# Patient Record
Sex: FEMALE | Race: WHITE | NOT HISPANIC OR LATINO | Employment: OTHER | URBAN - METROPOLITAN AREA
[De-identification: names, ages, dates, MRNs, and addresses within clinical notes are randomized per-mention and may not be internally consistent; named-entity substitution may affect disease eponyms.]

---

## 2017-02-12 ENCOUNTER — RADIANT APPOINTMENT (OUTPATIENT)
Dept: GENERAL RADIOLOGY | Facility: CLINIC | Age: 77
End: 2017-02-12
Attending: PHYSICIAN ASSISTANT
Payer: MEDICARE

## 2017-02-12 ENCOUNTER — OFFICE VISIT (OUTPATIENT)
Dept: URGENT CARE | Facility: URGENT CARE | Age: 77
End: 2017-02-12
Payer: MEDICARE

## 2017-02-12 DIAGNOSIS — S69.90XA FINGER INJURY, UNSPECIFIED LATERALITY, INITIAL ENCOUNTER: ICD-10-CM

## 2017-02-12 DIAGNOSIS — M79.645 PAIN OF FINGER OF LEFT HAND: ICD-10-CM

## 2017-02-12 DIAGNOSIS — S69.90XA FINGER INJURY, UNSPECIFIED LATERALITY, INITIAL ENCOUNTER: Primary | ICD-10-CM

## 2017-02-12 DIAGNOSIS — S66.812A RUPTURE OF EXTENSOR TENDON OF LEFT HAND, INITIAL ENCOUNTER: ICD-10-CM

## 2017-02-12 PROCEDURE — 29130 APPL FINGER SPLINT STATIC: CPT | Performed by: PHYSICIAN ASSISTANT

## 2017-02-12 PROCEDURE — 73140 X-RAY EXAM OF FINGER(S): CPT | Mod: LT

## 2017-02-12 PROCEDURE — 99213 OFFICE O/P EST LOW 20 MIN: CPT | Mod: 25 | Performed by: PHYSICIAN ASSISTANT

## 2017-02-12 NOTE — MR AVS SNAPSHOT
After Visit Summary   2/12/2017    Aurora Figueroa    MRN: [id number]           Patient Information     Date Of Birth          1940        Visit Information        Provider Department      2/12/2017 5:15 PM Arias Umanzor PA-C [facility]        Today's Diagnoses     Finger injury, unspecified laterality, initial encounter    -  1    Pain of finger of left hand        Rupture of extensor tendon of left hand, initial encounter           Follow-ups after your visit        Who to contact     If you have questions or need follow up information about today's clinic visit or your schedule please contact [facility] directly at [phone].  Normal or non-critical lab and imaging results will be communicated to you by MyChart, letter or phone within 4 business days after the clinic has received the results. If you do not hear from us within 7 days, please contact the clinic through MyChart or phone. If you have a critical or abnormal lab result, we will notify you by phone as soon as possible.  Submit refill requests through [employer] or call your pharmacy and they will forward the refill request to us. Please allow 3 business days for your refill to be completed.          Additional Information About Your Visit        Care EveryWhere ID     This is your Care EveryWhere ID. This could be used by other organizations to access your [place] medical records  [id number]        Your Vitals Were     Last Period                   (LMP Unknown)            Blood Pressure from Last 3 Encounters:   02/16/17 120/82   07/01/16 128/60   02/15/16 109/69    Weight from Last 3 Encounters:   02/16/17 204 lb (92.5 kg)   07/01/16 197 lb (89.4 kg)   02/15/16 200 lb 12.8 oz (91.1 kg)              We Performed the Following     APPLY FINGER SPLINT STATIC          Today's Medication Changes          These changes are accurate as of: 2/12/17 11:59 PM.  If you have  any questions, ask your nurse or doctor.               These medicines have changed or have updated prescriptions.        Dose/Directions    aspirin 325 MG EC tablet   This may have changed:    - how much to take  - when to take this   Used for:  Failed total knee arthroplasty, initial encounter (H)        Dose:  325 mg   Take 1 tablet (325 mg) by mouth 2 times daily (with meals)   Quantity:  60 tablet   Refills:  0                Primary Care Provider Office Phone # Fax #    Yoselin Mancera [phone]       [facility] [address] [address] [facility] [address]        Thank you!     Thank you for choosing [facility]  for your care. Our goal is always to provide you with excellent care. Hearing back from our patients is one way we can continue to improve our services. Please take a few minutes to complete the written survey that you may receive in the mail after your visit with us. Thank you!             Your Updated Medication List - Protect others around you: Learn how to safely use, store and throw away your medicines at www.disposemymeds.org.          This list is accurate as of: 2/12/17 11:59 PM.  Always use your most recent med list.                   Brand Name Dispense Instructions for use    aspirin 325 MG EC tablet     60 tablet    Take 1 tablet (325 mg) by mouth 2 times daily (with meals)       calcium-vitamin D 500-125 MG-UNIT Tabs      Take 6 tablets by mouth daily       Cranberry 200 MG Caps      Take 1 tablet by mouth daily       FLINTSTONES COMPLETE PO      Take 1 tablet by mouth 2 times daily       levothyroxine 50 MCG tablet    SYNTHROID/LEVOTHROID    90 tablet    TAKE 1 TABLET BY MOUTH DAILY       OMEPRAZOLE PO      Take 20 mg by mouth daily       terbinafine 1 % cream    lamISIL     Apply topically daily as needed Applies to spot on forehead       VITAMIN B 12 PO      Take 2,500 mcg by mouth daily sublingual       vitamin D 1000  UNITS capsule      Take 1 capsule by mouth daily

## 2017-02-16 ENCOUNTER — OFFICE VISIT (OUTPATIENT)
Dept: FAMILY MEDICINE | Facility: CLINIC | Age: 77
End: 2017-02-16
Payer: MEDICARE

## 2017-02-16 VITALS
RESPIRATION RATE: 12 BRPM | BODY MASS INDEX: 33.99 KG/M2 | WEIGHT: 204 LBS | OXYGEN SATURATION: 96 % | HEART RATE: 54 BPM | HEIGHT: 65 IN | SYSTOLIC BLOOD PRESSURE: 120 MMHG | DIASTOLIC BLOOD PRESSURE: 82 MMHG | TEMPERATURE: 97.5 F

## 2017-02-16 DIAGNOSIS — R00.0 TACHYCARDIA: Chronic | ICD-10-CM

## 2017-02-16 DIAGNOSIS — L10.4: ICD-10-CM

## 2017-02-16 DIAGNOSIS — I10 BENIGN ESSENTIAL HYPERTENSION: ICD-10-CM

## 2017-02-16 DIAGNOSIS — E78.00 HYPERCHOLESTEROLEMIA: ICD-10-CM

## 2017-02-16 DIAGNOSIS — R73.02 GLUCOSE INTOLERANCE (IMPAIRED GLUCOSE TOLERANCE): Primary | ICD-10-CM

## 2017-02-16 LAB
ALT SERPL W P-5'-P-CCNC: 23 U/L (ref 0–50)
ANION GAP SERPL CALCULATED.3IONS-SCNC: 10 MMOL/L (ref 3–14)
BUN SERPL-MCNC: 26 MG/DL (ref 7–30)
CALCIUM SERPL-MCNC: 10.3 MG/DL (ref 8.5–10.1)
CHLORIDE SERPL-SCNC: 103 MMOL/L (ref 94–109)
CHOLEST SERPL-MCNC: 175 MG/DL
CO2 SERPL-SCNC: 27 MMOL/L (ref 20–32)
CREAT SERPL-MCNC: 0.9 MG/DL (ref 0.52–1.04)
GFR SERPL CREATININE-BSD FRML MDRD: 61 ML/MIN/1.7M2
GLUCOSE SERPL-MCNC: 97 MG/DL (ref 70–99)
HBA1C MFR BLD: 5.6 % (ref 4.3–6)
HDLC SERPL-MCNC: 61 MG/DL
LDLC SERPL CALC-MCNC: 92 MG/DL
NONHDLC SERPL-MCNC: 114 MG/DL
POTASSIUM SERPL-SCNC: 4 MMOL/L (ref 3.4–5.3)
SODIUM SERPL-SCNC: 140 MMOL/L (ref 133–144)
TRIGL SERPL-MCNC: 111 MG/DL

## 2017-02-16 PROCEDURE — 80061 LIPID PANEL: CPT | Performed by: FAMILY MEDICINE

## 2017-02-16 PROCEDURE — 80048 BASIC METABOLIC PNL TOTAL CA: CPT | Performed by: FAMILY MEDICINE

## 2017-02-16 PROCEDURE — 36415 COLL VENOUS BLD VENIPUNCTURE: CPT | Performed by: FAMILY MEDICINE

## 2017-02-16 PROCEDURE — 99214 OFFICE O/P EST MOD 30 MIN: CPT | Performed by: FAMILY MEDICINE

## 2017-02-16 PROCEDURE — 83036 HEMOGLOBIN GLYCOSYLATED A1C: CPT | Performed by: FAMILY MEDICINE

## 2017-02-16 PROCEDURE — 84460 ALANINE AMINO (ALT) (SGPT): CPT | Performed by: FAMILY MEDICINE

## 2017-02-16 RX ORDER — METOPROLOL SUCCINATE 25 MG/1
25 TABLET, EXTENDED RELEASE ORAL DAILY
Qty: 90 TABLET | Refills: 1 | Status: SHIPPED | OUTPATIENT
Start: 2017-02-16 | End: 2017-08-03

## 2017-02-16 RX ORDER — SIMVASTATIN 40 MG
40 TABLET ORAL DAILY
Qty: 90 TABLET | Refills: 1 | Status: SHIPPED | OUTPATIENT
Start: 2017-02-16 | End: 2017-08-03

## 2017-02-16 NOTE — NURSING NOTE
"Chief Complaint   Patient presents with     RECHECK     /82 (BP Location: Left arm, Patient Position: Chair, Cuff Size: Child)  Pulse 54  Temp 97.5  F (36.4  C) (Tympanic)  Resp 12  Ht 5' 5\" (1.651 m)  Wt 204 lb (92.5 kg)  LMP  (LMP Unknown)  SpO2 96%  Breastfeeding? Unknown  BMI 33.95 kg/m2 Estimated body mass index is 33.95 kg/(m^2) as calculated from the following:    Height as of this encounter: 5' 5\" (1.651 m).    Weight as of this encounter: 204 lb (92.5 kg).  BP completed using cuff size: regular   Syeda Her CMA    Health Maintenance Due   Topic Date Due     MEDICARE ANNUAL WELLNESS VISIT  07/22/2015     Health Maintenance reviewed at today's visit patient asked to schedule/complete:   None, Health Maintenance up to date.    "

## 2017-02-16 NOTE — PROGRESS NOTES
SUBJECTIVE:                                                    Aurora Figueroa is a 77 year old female who presents to clinic today for the following health issues:     NONMORBID OBESITY      BMI= 33    Little exercise with bad joints     Comorbid HTN, hi LDL            Glucose Intolerance Follow-up      Patient is checking blood sugars: not at all     high in hospital to 133    Diabetic concerns: None     Symptoms of hypoglycemia (low blood sugar): none     Paresthesias (numbness or burning in feet) or sores: No     Date of last diabetic eye exam: 2015     Hyperlipidemia-LDL  Follow-Up      Rate your low fat/cholesterol diet?: not monitoring fat    Taking statin?  Yes, no muscle aches from 40mgm simvastatin    Other lipid medications/supplements?:  None    Lipids wnl in 2016 July      Hypertension Follow-up      Outpatient blood pressures are not being checked.   Here < 140/80    Low Salt Diet: not monitoring salt            TACHYCARDIA      Since 1978     No caffeine except for some tea    Rx with B blocker     PEMPHIGUS     -OF CHEEKS  And now back   -sees derm and has steroid cream     Problem list and histories reviewed & adjusted, as indicated.  Additional history: as documented    Labs reviewed in EPIC  Problem list, Medication list, Allergies, and Medical/Social/Surgical histories reviewed in [facility] and updated as appropriate.    ROS:  C: NEGATIVE for fever, chills, change in weight  I: NEGATIVE for worrisome rashes, moles or lesions-pemphigus   E: NEGATIVE for vision changes or irritation  E/M: NEGATIVE for ear, mouth and throat problems  R: NEGATIVE for significant cough or SOB  B: NEGATIVE for masses, tenderness or discharge  CV: NEGATIVE for chest pain, palpitations or peripheral edema  GI: NEGATIVE for nausea, abdominal pain, heartburn, or change in bowel habits  : NEGATIVE for frequency, dysuria, or hematuria  M: NEGATIVE for significant arthralgias or myalgia  N: NEGATIVE for weakness,  "dizziness or paresthesias  E: NEGATIVE for temperature intolerance, skin/hair changes  H: NEGATIVE for bleeding problems  P: NEGATIVE for changes in mood or affect    OBJECTIVE:                                                    /82 (BP Location: Left arm, Patient Position: Chair, Cuff Size: Child)  Pulse 54  Temp 97.5  F (36.4  C) (Tympanic)  Resp 12  Ht 5' 5\" (1.651 m)  Wt 204 lb (92.5 kg)  LMP  (LMP Unknown)  SpO2 96%  Breastfeeding? Unknown  BMI 33.95 kg/m2  Body mass index is 33.95 kg/(m^2).  GENERAL: healthy, alert and no distress  EYES: Eyes grossly normal to inspection, PERRL and conjunctivae and sclerae normal  RESP: lungs clear to auscultation - no rales, rhonchi or wheezes  CV: regular rate and rhythm, normal S1 S2, no S3 or S4, no murmur, click or rub, no peripheral edema and peripheral pulses strong  ABDOMEN: soft, nontender, no hepatosplenomegaly, no masses and bowel sounds normal  MS: no gross musculoskeletal defects noted, no edema  SKIN: no suspicious lesions or rashes--red scaley spots on cheeks and back   NEURO: Normal strength and tone, mentation intact and speech normal  PSYCH: mentation appears normal, affect normal/bright    Diagnostic Test Results:  Results for orders placed or performed in visit on 02/16/17 (from the past 24 hour(s))   Lipid panel reflex to direct LDL   Result Value Ref Range    Cholesterol 175 <200 mg/dL    Triglycerides 111 <150 mg/dL    HDL Cholesterol 61 >49 mg/dL    LDL Cholesterol Calculated 92 <100 mg/dL    Non HDL Cholesterol 114 <130 mg/dL   ALT   Result Value Ref Range    ALT 23 0 - 50 U/L   Basic metabolic panel   Result Value Ref Range    Sodium 140 133 - 144 mmol/L    Potassium 4.0 3.4 - 5.3 mmol/L    Chloride 103 94 - 109 mmol/L    Carbon Dioxide 27 20 - 32 mmol/L    Anion Gap 10 3 - 14 mmol/L    Glucose 97 70 - 99 mg/dL    Urea Nitrogen 26 7 - 30 mg/dL    Creatinine 0.90 0.52 - 1.04 mg/dL    GFR Estimate 61 >60 mL/min/1.7m2    GFR Estimate If Black 74 " >60 mL/min/1.7m2    Calcium 10.3 (H) 8.5 - 10.1 mg/dL   Hemoglobin A1c   Result Value Ref Range    Hemoglobin A1C 5.6 4.3 - 6.0 %        ASSESSMENT/PLAN:                                                              ICD-10-CM    1. Glucose intolerance (impaired glucose tolerance) R73.02 Basic metabolic panel     Hemoglobin A1c   2. Benign essential hypertension I10 Basic metabolic panel   3. Hypercholesterolemia E78.00 Lipid panel reflex to direct LDL     ALT     simvastatin (ZOCOR) 40 MG tablet   4. Tachycardia since 1978 R00.0 metoprolol (TOPROL-XL) 25 MG 24 hr tablet   5. 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  Weight Loss Tips  1. Do not eat after 6 hrs before your expected bedtime  2. Have your heaviest meal for breakfast, a slightly lighter meal at lunch and a snack 6 hrs before bed  3. No sugar/calorie drinks except milk ie no fruit juice, pop, alcohol.  4. Drink milk 30min before meals to decrease your hunger. Also it is excellent as part of your last meal of the day snack  5. Drink lots of water  6. Increase fiber in diet: all bran cereal, salads, popcorn etc  7. Have only one small serving of fruit a day about 1/2 cup (as this is high in sugar)  8. EXERCISE is the bottom line. Without it, you will gain weight even on a low calorie diet. Best if done 2-3X a day as can    Being overweight contributes to high blood pressure and high cholesterol, both of which cause heart attacks, strokes and kidney failure, prediabetes and diabetes, arthritis, and liver disease     2. Please return in 6 mo   For a physical     3.  Eat calcium : dairy and greens  Do not take calcium in pill form as it can plaque on the heart arteries and cause kidney stones    4. .cove r the steroid  with vaseline     Could also use HC-mild  dollar tree      Steroid creams   Over the counter hydrocortisone cream-may apply  2-4 X a day   Decreases red, itch and scaling     Cover  The steroid cream with vaseline intensive care  Then saran wrap and hold it on with a cut open  Sock or sleeve of a T-shirt         Weight management plan: Discussed healthy diet and exercise guidelines and patient will follow up in 6 months in clinic to re-evaluate.    Yoselin Mancera MD

## 2017-02-16 NOTE — MR AVS SNAPSHOT
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  Weight Loss Tips  1. Do not eat after 6 hrs before your expected bedtime  2. Have your heaviest meal for breakfast, a slightly lighter meal at lunch and a snack 6 hrs before bed  3. No sugar/calorie drinks except milk ie no fruit juice, pop, alcohol.  4. Drink milk 30min before meals to decrease your hunger. Also it is excellent as part of your last meal of the day snack  5. Drink lots of water  6. Increase fiber in diet: all bran cereal, salads, popcorn etc  7. Have only one small serving of fruit a day about 1/2 cup (as this is high in sugar)  8. EXERCISE is the bottom line. Without it, you will gain weight even on a low calorie diet. Best if done 2-3X a day as can    Being overweight contributes to high blood pressure and high cholesterol, both of which cause heart attacks, strokes and kidney failure, prediabetes and diabetes, arthritis, and liver disease     2. Please return in 6 mo   For a physical     3.  Eat calcium : dairy and greens  Do not take calcium in pill form as it can plaque on the heart arteries and cause kidney stones    4. .cove r the steroid  with vaseline     Could also use HC-mild  dollar tree      Steroid creams   Over the counter hydrocortisone cream-may apply 2-4 X a day   Decreases red, itch and scaling     Cover  The steroid cream with vaseline intensive care  Then saran wrap and hold it on with a cut open  Sock or sleeve  "of a T-shirt           Follow-ups after your visit        Follow-up notes from your care team     Return in about 6 months (around 8/16/2017) for Physical Exam.      Who to contact     If you have questions or need follow up information about today's clinic visit or your schedule please contact [facility] directly at [phone].  Normal or non-critical lab and imaging results will be communicated to you by MyChart, letter or phone within 4 business days after the clinic has received the results. If you do not hear from us within 7 days, please contact the clinic through MyChart or phone. If you have a critical or abnormal lab result, we will notify you by phone as soon as possible.  Submit refill requests through [employer] or call your pharmacy and they will forward the refill request to us. Please allow 3 business days for your refill to be completed.          Additional Information About Your Visit        Care EveryWhere ID     This is your Care EveryWhere ID. This could be used by other organizations to access your [place] medical records  [id number]        Your Vitals Were     Pulse Temperature Respirations Height Last Period Pulse Oximetry    54 97.5  F (36.4  C) (Tympanic) 12 5' 5\" (1.651 m) (LMP Unknown) 96%    Breastfeeding? BMI (Body Mass Index)                Unknown 33.95 kg/m2           Blood Pressure from Last 3 Encounters:   02/16/17 120/82   07/01/16 128/60   02/15/16 109/69    Weight from Last 3 Encounters:   02/16/17 204 lb (92.5 kg)   07/01/16 197 lb (89.4 kg)   02/15/16 200 lb 12.8 oz (91.1 kg)              We Performed the Following     ALT     Basic metabolic panel     Hemoglobin A1c     Lipid panel reflex to direct LDL          Today's Medication Changes          These changes are accurate as of: 2/16/17 10:47 AM.  If you have any questions, ask your nurse or doctor.               These medicines have changed or have updated prescriptions.        Dose/Directions "    aspirin 325 MG EC tablet   This may have changed:    - how much to take  - when to take this   Used for:  Failed total knee arthroplasty, initial encounter (H)        Dose:  325 mg   Take 1 tablet (325 mg) by mouth 2 times daily (with meals)   Quantity:  60 tablet   Refills:  0            Where to get your medicines      These medications were sent to [facility]/pharmacy #[id number] - [place], [place] - [address]  [address] [address]     Phone:  [phone]     metoprolol 25 MG 24 hr tablet    simvastatin 40 MG tablet                Primary Care Provider Office Phone # Fax #    Yoselin Mancera [phone]       [facility] [address]  [facility] [address]        Thank you!     Thank you for choosing [facility]  for your care. Our goal is always to provide you with excellent care. Hearing back from our patients is one way we can continue to improve our services. Please take a few minutes to complete the written survey that you may receive in the mail after your visit with us. Thank you!             Your Updated Medication List - Protect others around you: Learn how to safely use, store and throw away your medicines at www.disposemymeds.org.          This list is accurate as of: 2/16/17 10:47 AM.  Always use your most recent med list.                   Brand Name Dispense Instructions for use    aspirin 325 MG EC tablet     60 tablet    Take 1 tablet (325 mg) by mouth 2 times daily (with meals)       calcium-vitamin D 500-125 MG-UNIT Tabs      Take 6 tablets by mouth daily       Cranberry 200 MG Caps      Take 1 tablet by mouth daily       FLINTSTONES COMPLETE PO      Take 1 tablet by mouth 2 times daily       levothyroxine 50 MCG tablet    SYNTHROID/LEVOTHROID    90 tablet    TAKE 1 TABLET BY MOUTH DAILY       metoprolol 25 MG 24 hr tablet    TOPROL-XL    90 tablet    Take 1 tablet (25 mg) by mouth daily        OMEPRAZOLE PO      Take 20 mg by mouth daily       simvastatin 40 MG tablet    ZOCOR    90 tablet    Take 1 tablet (40 mg) by mouth daily       terbinafine 1 % cream    lamISIL     Apply topically daily as needed Applies to spot on forehead       VITAMIN B 12 PO      Take 2,500 mcg by mouth daily sublingual       vitamin D 1000 UNITS capsule      Take 1 capsule by mouth daily

## 2017-02-16 NOTE — LETTER
[facility]  [address]  [address]  [facility] [address]  [phone]                                                                                                           Aurora Figueroa  [address]  [place] [address]    February 24, 2017      Dear Aurora,    The results of your recent tests were reviewed and are enclosed.     Please see attached lab results  They are all normal     THE FOLLOWING ARE EXPLANATIONS OF SOME OF OUR LAB TESTS     YOU DID NOT NECESSARILY HAVE ALL OF THESE DONE     Hgb is the blood iron level  WBC means White Blood Cells  Platelets are small blood cells that help with forming the blood clots along with other blood factors.  Electrolytes are Sodium, Potassium, Calcium, Magnesium, Phosphorus.  Liver tests are: AST, ALT, Bilirubin, Alkaline Phosphatase.  Kidney tests are Creatinine, GFR.  HDL Cholesterol - is the good cholesterol and it is good to have it high.  LDL cholesterol is the bad cholesterol and it is good to have it low.  It is recommended to have LDL less than 130 for people with hypertension and to have it less than 100 for people with heart disease, diabetes and chronic kidney disease.  Triglycerides are another type of lipid that can cause heart disease, like the cholesterol and should be kept low   Thyroid tests are TSH, T4, T3  Glucose is sugar.  A1c is a test that gives us an idea about how well was controlled the diabetes for the last 3 months.   PSA stands for Prostate Specific Antigen and it can be elevated with prostate cancer or prostate inflammation.    Good diabetic control   Good thyroid level     Please continue on the same medications    Results for orders placed or performed in visit on 02/16/17   Lipid panel reflex to direct LDL   Result Value Ref Range    Cholesterol 175 <200 mg/dL    Triglycerides 111 <150 mg/dL    HDL Cholesterol 61 >49 mg/dL    LDL Cholesterol Calculated 92  <100 mg/dL    Non HDL Cholesterol 114 <130 mg/dL   ALT   Result Value Ref Range    ALT 23 0 - 50 U/L   Basic metabolic panel   Result Value Ref Range    Sodium 140 133 - 144 mmol/L    Potassium 4.0 3.4 - 5.3 mmol/L    Chloride 103 94 - 109 mmol/L    Carbon Dioxide 27 20 - 32 mmol/L    Anion Gap 10 3 - 14 mmol/L    Glucose 97 70 - 99 mg/dL    Urea Nitrogen 26 7 - 30 mg/dL    Creatinine 0.90 0.52 - 1.04 mg/dL    GFR Estimate 61 >60 mL/min/1.7m2    GFR Estimate If Black 74 >60 mL/min/1.7m2    Calcium 10.3 (H) 8.5 - 10.1 mg/dL   Hemoglobin A1c   Result Value Ref Range    Hemoglobin A1C 5.6 4.3 - 6.0 %         Thank you for choosing [facility].  We appreciate the opportunity to serve you and look forward to supporting your healthcare needs in the future.    If you have any questions or concerns, please call me or my staff at [phone].      Sincerely,    Yoselin Mancera MD

## 2017-02-16 NOTE — PATIENT INSTRUCTIONS
1.  Weight Loss Tips  1. Do not eat after 6 hrs before your expected bedtime  2. Have your heaviest meal for breakfast, a slightly lighter meal at lunch and a snack 6 hrs before bed  3. No sugar/calorie drinks except milk ie no fruit juice, pop, alcohol.  4. Drink milk 30min before meals to decrease your hunger. Also it is excellent as part of your last meal of the day snack  5. Drink lots of water  6. Increase fiber in diet: all bran cereal, salads, popcorn etc  7. Have only one small serving of fruit a day about 1/2 cup (as this is high in sugar)  8. EXERCISE is the bottom line. Without it, you will gain weight even on a low calorie diet. Best if done 2-3X a day as can    Being overweight contributes to high blood pressure and high cholesterol, both of which cause heart attacks, strokes and kidney failure, prediabetes and diabetes, arthritis, and liver disease     2. Please return in 6 mo   For a physical     3.  Eat calcium : dairy and greens  Do not take calcium in pill form as it can plaque on the heart arteries and cause kidney stones    4. .cove r the steroid  with vaseline     Could also use HC-mild  dollar tree      Steroid creams   Over the counter hydrocortisone cream-may apply 2-4 X a day   Decreases red, itch and scaling     Cover  The steroid cream with vaseline intensive care  Then santiago wrap and hold it on with a cut open  Sock or sleeve of a T-shirt

## 2017-02-20 NOTE — PROGRESS NOTES
SUBJECTIVE:  No chief complaint on file.    Aurora Figueroa is a 77 year old female presents with a chief complaint of left finger.  Injury occurred 1 day ago.  Symptoms have not improved     Past Medical History   Diagnosis Date     Hyperlipidemia      Hypothyroidism      LUMBOSACRAL NEURITIS NOS 4/12/2007     Sleep apnea      does not wear CPAP     Tachycardia      Allergies   Allergen Reactions     Penicillins Hives     Animal Dander      Pruritis,itchi eyes, throat and ears.     E.E.S. GI Disturbance     Erythromycin GI Disturbance     Fruit [[place]]      Hives, itchy throat tomatoes     Pollen Extract Itching     Social History   Substance Use Topics     Smoking status: Never Smoker     Smokeless tobacco: Never Used     Alcohol use 0.0 oz/week     0 Standard drinks or equivalent per week      Comment: rare       ROS:  CONSTITUTIONAL:NEGATIVE for fever, chills, change in weight  INTEGUMENTARY/SKIN: NEGATIVE for worrisome rashes, moles or lesions  MUSCULOSKELETAL: POSITIVE  for extensor tendon injury left finger  NEURO: Positive for extensor tendon injury    EXAM:   LMP  (LMP Unknown)  Gen: healthy, alert, active and healthy,alert,no distress  Extremity: finger  has point tenderness DIP joint and decreased ROM .   There is not compromise to the distal circulation.  Pulses are +2 and CRT is brisk  GENERAL APPEARANCE: healthy, alert and no distress  EXTREMITIES: peripheral pulses normal  MS:  Positive for left DIP tenderness  SKIN: no suspicious lesions or rashes  NEURO: Normal strength and tone, sensory exam grossly normal, mentation intact and speech normal    X-RAY was done and negative for acute changes including fracture Xray read by Arias Umanzor at time of visit    ASSESSMENT/PLAN:      ICD-10-CM    1. Finger injury, unspecified laterality, initial encounter S69.90XA XR Finger Left G/E 2 Views   2. Pain of finger of left hand M79.645 XR Finger Left G/E 2 Views     APPLY FINGER SPLINT STATIC   3.  Rupture of extensor tendon of left hand, initial encounter S66.970Q APPLY FINGER SPLINT STATIC     RICE treatment: Rest, Ice, compression, elevation   Wear splint   Follow up orthopedis    Procedure: Jackelyn finger splint cut and formed

## 2017-02-24 NOTE — PROGRESS NOTES
.  Please see attached lab results  They are all normal     THE FOLLOWING ARE EXPLANATIONS OF SOME OF OUR LAB TESTS     YOU DID NOT NECESSARILY HAVE ALL OF THESE DONE     Hgb is the blood iron level  WBC means White Blood Cells  Platelets are small blood cells that help with forming the blood clots along with other blood factors.  Electrolytes are Sodium, Potassium, Calcium, Magnesium, Phosphorus.  Liver tests are: AST, ALT, Bilirubin, Alkaline Phosphatase.  Kidney tests are Creatinine, GFR.  HDL Cholesterol - is the good cholesterol and it is good to have it high.  LDL cholesterol is the bad cholesterol and it is good to have it low.  It is recommended to have LDL less than 130 for people with hypertension and to have it less than 100 for people with heart disease, diabetes and chronic kidney disease.  Triglycerides are another type of lipid that can cause heart disease, like the cholesterol and should be kept low   Thyroid tests are TSH, T4, T3  Glucose is sugar.  A1c is a test that gives us an idea about how well was controlled the diabetes for the last 3 months.   PSA stands for Prostate Specific Antigen and it can be elevated with prostate cancer or prostate inflammation.    Good diabetic control   Good thyroid level     Please continue on the same medications

## 2017-05-01 DIAGNOSIS — E03.4 HYPOTHYROIDISM DUE TO ACQUIRED ATROPHY OF THYROID: Chronic | ICD-10-CM

## 2017-05-02 RX ORDER — LEVOTHYROXINE SODIUM 50 UG/1
TABLET ORAL
Qty: 180 TABLET | Refills: 0 | OUTPATIENT
Start: 2017-05-02

## 2017-05-02 NOTE — TELEPHONE ENCOUNTER
Levothyroxine 50 mcg     Last Written Prescription Date: 7/18/16  Last Quantity: 90, # refills: 3  Last Office Visit with G, P or [facility] prescribing provider: 2/16/17        TSH   Date Value Ref Range Status   07/01/2016 3.08 0.40 - 5.00 mU/L Final

## 2017-07-28 DIAGNOSIS — E03.4 HYPOTHYROIDISM DUE TO ACQUIRED ATROPHY OF THYROID: Chronic | ICD-10-CM

## 2017-07-31 NOTE — TELEPHONE ENCOUNTER
Levothyroxine 50 mcg     Last Written Prescription Date: 7/18/16  Last Quantity: 90, # refills: 3  Last Office Visit with G, P or [facility] prescribing provider: 2/16/17   Next 5 appointments (look out 90 days)     Aug 03, 2017 10:00 AM CDT   Office Visit with Yoselin [facility]a MD   [facility]    [address] [place] [id number] [phone]                   TSH   Date Value Ref Range Status   07/01/2016 3.08 0.40 - 5.00 mU/L Final

## 2017-08-01 RX ORDER — LEVOTHYROXINE SODIUM 50 UG/1
TABLET ORAL
Qty: 90 TABLET | Refills: 0 | Status: SHIPPED | OUTPATIENT
Start: 2017-08-01 | End: 2017-10-29

## 2017-08-03 ENCOUNTER — TELEPHONE (OUTPATIENT)
Dept: FAMILY MEDICINE | Facility: CLINIC | Age: 77
End: 2017-08-03

## 2017-08-03 ENCOUNTER — OFFICE VISIT (OUTPATIENT)
Dept: FAMILY MEDICINE | Facility: CLINIC | Age: 77
End: 2017-08-03
Payer: MEDICARE

## 2017-08-03 VITALS
WEIGHT: 206 LBS | BODY MASS INDEX: 34.32 KG/M2 | OXYGEN SATURATION: 98 % | RESPIRATION RATE: 12 BRPM | SYSTOLIC BLOOD PRESSURE: 128 MMHG | HEART RATE: 50 BPM | TEMPERATURE: 97.1 F | HEIGHT: 65 IN | DIASTOLIC BLOOD PRESSURE: 74 MMHG

## 2017-08-03 DIAGNOSIS — R73.02 GLUCOSE INTOLERANCE (IMPAIRED GLUCOSE TOLERANCE): ICD-10-CM

## 2017-08-03 DIAGNOSIS — I10 BENIGN ESSENTIAL HYPERTENSION: ICD-10-CM

## 2017-08-03 DIAGNOSIS — E78.00 HYPERCHOLESTEROLEMIA: ICD-10-CM

## 2017-08-03 DIAGNOSIS — R19.4 CHANGE IN STOOL HABITS: Primary | ICD-10-CM

## 2017-08-03 DIAGNOSIS — E66.09 NON MORBID OBESITY DUE TO EXCESS CALORIES: ICD-10-CM

## 2017-08-03 DIAGNOSIS — E03.4 HYPOTHYROIDISM DUE TO ACQUIRED ATROPHY OF THYROID: Chronic | ICD-10-CM

## 2017-08-03 LAB
ALT SERPL W P-5'-P-CCNC: 27 U/L (ref 0–50)
HBA1C MFR BLD: 5.5 % (ref 4.3–6)
LDLC SERPL DIRECT ASSAY-MCNC: 88 MG/DL
TSH SERPL DL<=0.005 MIU/L-ACNC: 2.5 MU/L (ref 0.4–4)

## 2017-08-03 PROCEDURE — 84460 ALANINE AMINO (ALT) (SGPT): CPT | Performed by: FAMILY MEDICINE

## 2017-08-03 PROCEDURE — 83036 HEMOGLOBIN GLYCOSYLATED A1C: CPT | Performed by: FAMILY MEDICINE

## 2017-08-03 PROCEDURE — 36415 COLL VENOUS BLD VENIPUNCTURE: CPT | Performed by: FAMILY MEDICINE

## 2017-08-03 PROCEDURE — 83721 ASSAY OF BLOOD LIPOPROTEIN: CPT | Performed by: FAMILY MEDICINE

## 2017-08-03 PROCEDURE — 99214 OFFICE O/P EST MOD 30 MIN: CPT | Performed by: FAMILY MEDICINE

## 2017-08-03 PROCEDURE — 84443 ASSAY THYROID STIM HORMONE: CPT | Performed by: FAMILY MEDICINE

## 2017-08-03 RX ORDER — SIMVASTATIN 40 MG
40 TABLET ORAL DAILY
Qty: 90 TABLET | Refills: 1 | Status: SHIPPED | OUTPATIENT
Start: 2017-08-03 | End: 2018-10-11

## 2017-08-03 RX ORDER — METOPROLOL SUCCINATE 25 MG/1
25 TABLET, EXTENDED RELEASE ORAL DAILY
Qty: 90 TABLET | Refills: 1 | Status: SHIPPED | OUTPATIENT
Start: 2017-08-03 | End: 2018-07-23

## 2017-08-03 NOTE — PATIENT INSTRUCTIONS
1. Call for your colonoscopy at [facility]   For change in bowel habits   [phone]    2. Stop the calcium pills   Eat calcium : dairy and greens  Do not take calcium in pill form as it can plaque on the heart arteries and cause kidney stones    3. The only way known to prevent diabetes or keep it from getting worse is exercise, 20-40 minutes 3 times a day around the time of meals as your insulin is wearing out  You need to get rid of the sugar using your muscles       4.  Weight Loss Tips  1. Do not eat after 6 hrs before your expected bedtime  2. Have your heaviest meal for breakfast, a slightly lighter meal at lunch and a snack 6 hrs before bed  3. No sugar/calorie drinks except milk ie no fruit juice, pop, alcohol.  4. Drink milk 30min before meals to decrease your hunger. Also it is excellent as part of your last meal of the day snack  5. Drink lots of water  6. Increase fiber in diet: all bran cereal, salads, popcorn etc  7. Have only one small serving of fruit a day about 1/2 cup (as this is high in sugar)  8. EXERCISE is the bottom line. Without it, you will gain weight even on a low calorie diet. Best if done 2-3X a day as can    Being overweight contributes to high blood pressure and high cholesterol, both of which cause heart attacks, strokes and kidney failure, prediabetes and diabetes, arthritis, and liver disease

## 2017-08-03 NOTE — PROGRESS NOTES
SUBJECTIVE:                                                    Aurora Figueroa is a 77 year old female who presents to clinic today for the following health issues:    Constipation      Duration: x 2-3 month    Sudden onset over weeks     Description:       Frequency of bowel movements: 4-5x days   Prior was q d        Consistency of stool: Soft and Narrow    Intensity:  moderate    Accompanying signs and symptoms: Rectal Itching       Abdominal pain: no        Rectal pain: no        Blood in stool: no        Nausea/vomitting: no     History:        Similar problems in past: no     Precipitating or alleviating factors: None       Medications worsening symptoms: no     Therapies tried and outcome: None       Chronic laxative use: no     Glucose Intolerance   Follow-up       Patient is checking blood sugars: not at all    HgbA1C = 5.6 in 7-16    Diabetic concerns: None     Symptoms of hypoglycemia (low blood sugar): none     Paresthesias (numbness or burning in feet) or sores: No     Date of last diabetic eye exam: 2016    Hyperlipidemia:LDL Follow-Up      Rate your low fat/cholesterol diet?: good    Taking statin?  Yes, no muscle aches from 40mgm simvastatin    Other lipid medications/supplements?:  none    Hypertension Follow-up      Outpatient blood pressures are not being checked.   Here < 140/80    Low Salt Diet: no added salt    Hypothyroidism Follow-up      Since last visit, patient describes the following symptoms: Weight stable, no hair loss, no skin changes, no constipation, no loose stools    On levothyroxine 50mcg      NONMORBID OBESITY    -BMI= 34.35  -comorbid glu intol, hi chol, HTN         Problem list and histories reviewed & adjusted, as indicated.  Additional history: as documented    Labs reviewed in EPIC    Reviewed and updated as needed this visit by clinical staffTobacco  Allergies  Meds  Problems       Reviewed and updated as needed this visit by Provider         ROS:  C: NEGATIVE  "for fever, chills, change in weight  I: NEGATIVE for worrisome rashes, moles or lesions  E: NEGATIVE for vision changes or irritation  E/M: NEGATIVE for ear, mouth and throat problems  R: NEGATIVE for significant cough or SOB  B: NEGATIVE for masses, tenderness or discharge  CV: NEGATIVE for chest pain, palpitations or peripheral edema  GI: NEGATIVE for nausea, abdominal pain, heartburn, ; yes= change in bowel habits  : NEGATIVE for frequency, dysuria, or hematuria  M: NEGATIVE for significant arthralgias or myalgia  N: NEGATIVE for weakness, dizziness or paresthesias  E: NEGATIVE for temperature intolerance, skin/hair changes  H: NEGATIVE for bleeding problems  P: NEGATIVE for changes in mood or affect    OBJECTIVE:     /74  Pulse 50  Temp 97.1  F (36.2  C) (Tympanic)  Resp 12  Ht 5' 5\" (1.651 m)  Wt 206 lb (93.4 kg)  LMP  (LMP Unknown)  SpO2 98%  Breastfeeding? No  BMI 34.28 kg/m2  Body mass index is 34.28 kg/(m^2).  GENERAL: healthy, alert and no distress  EYES: Eyes grossly normal to inspection, PERRL and conjunctivae and sclerae normal  RESP: lungs clear to auscultation - no rales, rhonchi or wheezes  CV: regular rate and rhythm, normal S1 S2, no S3 or S4, no murmur, click or rub, no peripheral edema and peripheral pulses strong  ABDOMEN: soft, nontender, no hepatosplenomegaly, no masses and bowel sounds normal  MS: no gross musculoskeletal defects noted, no edema  SKIN: no suspicious lesions or rashes  NEURO: Normal strength and tone, mentation intact and speech normal  PSYCH: mentation appears normal, affect normal/bright    Diagnostic Test Results:  Results for orders placed or performed in visit on 08/03/17 (from the past 24 hour(s))   Hemoglobin A1c   Result Value Ref Range    Hemoglobin A1C 5.5 4.3 - 6.0 %   ALT   Result Value Ref Range    ALT 27 0 - 50 U/L   LDL cholesterol direct   Result Value Ref Range    LDL Cholesterol Direct 88 <100 mg/dL   TSH with free T4 reflex   Result Value " Ref Range    TSH 2.50 0.40 - 4.00 mU/L       ASSESSMENT/PLAN:               ICD-10-CM    1. Change in stool habits since 4-17 R19.4 GASTROENTEROLOGY ADULT REF PROCEDURE ONLY   2. Non morbid obesity due to excess calories s/po bariatric surg 2009 w comorbid HTN, hi LDL E66.09    3. Hypothyroidism due to acquired atrophy of thyroid E03.4 TSH with free T4 reflex   4. Glucose intolerance (impaired glucose tolerance) R73.02 Hemoglobin A1c   5. Benign essential hypertension I10 metoprolol (TOPROL-XL) 25 MG 24 hr tablet   6. Hypercholesterolemia E78.00 ALT     LDL cholesterol direct     simvastatin (ZOCOR) 40 MG tablet       Patient Instructions   1. Call for your colonoscopy at [facility]   For change in bowel habits   [phone]    2. Stop the calcium pills   Eat calcium : dairy and greens  Do not take calcium in pill form as it can plaque on the heart arteries and cause kidney stones    3. The only way known to prevent diabetes or keep it from getting worse is exercise, 20-40 minutes 3 times a day around the time of meals as your insulin is wearing out  You need to get rid of the sugar using your muscles       4.  Weight Loss Tips  1. Do not eat after 6 hrs before your expected bedtime  2. Have your heaviest meal for breakfast, a slightly lighter meal at lunch and a snack 6 hrs before bed  3. No sugar/calorie drinks except milk ie no fruit juice, pop, alcohol.  4. Drink milk 30min before meals to decrease your hunger. Also it is excellent as part of your last meal of the day snack  5. Drink lots of water  6. Increase fiber in diet: all bran cereal, salads, popcorn etc  7. Have only one small serving of fruit a day about 1/2 cup (as this is high in sugar)  8. EXERCISE is the bottom line. Without it, you will gain weight even on a low calorie diet. Best if done 2-3X a day as can    Being overweight contributes to high blood pressure and high cholesterol, both of which cause heart attacks, strokes and kidney failure,  prediabetes and diabetes, arthritis, and liver disease         Discussed all with pt  And the patient expresses understanding  And she decided to do the colonoscopy re the risk of ca     Yoselin Mancera MD  [facility]    Weight management plan: Discussed healthy diet and exercise guidelines and patient will follow up in 3 months in clinic to re-evaluate.    Yoselin Mancera MD

## 2017-08-03 NOTE — TELEPHONE ENCOUNTER
[facility] calling stating that they have not received an order for the colonoscopy and wanted the information over the phone. Reason for procedure and type of procedure given to caller.      Nano Love RN  08/03/17  11:40 AM

## 2017-08-03 NOTE — NURSING NOTE
"Chief Complaint   Patient presents with     Rectal Problem     /74  Pulse 50  Temp 97.1  F (36.2  C) (Tympanic)  Resp 12  Ht 5' 5\" (1.651 m)  Wt 206 lb (93.4 kg)  LMP  (LMP Unknown)  SpO2 98%  Breastfeeding? No  BMI 34.28 kg/m2 Estimated body mass index is 34.28 kg/(m^2) as calculated from the following:    Height as of this encounter: 5' 5\" (1.651 m).    Weight as of this encounter: 206 lb (93.4 kg).  BP completed using cuff size: large   Syeda Her CMA    Health Maintenance Due   Topic Date Due     MEDICARE ANNUAL WELLNESS VISIT  07/22/2015     Health Maintenance reviewed at today's visit patient asked to schedule/complete:   None, Health Maintenance up to date.    "

## 2017-08-03 NOTE — MR AVS SNAPSHOT
After Visit Summary   8/3/2017    Aurora Figueroa    MRN: [id number]           Patient Information     Date Of Birth          1940        Visit Information        Provider Department      8/3/2017 10:00 AM Yoselin Mancera MD [facility]        Today's Diagnoses     Change in stool habits since 4-17 - 1    Non morbid obesity due to excess calories s/po bariatric surg 2009 w comorbid HTN, hi LDL        Hypothyroidism due to acquired atrophy of thyroid        Glucose intolerance (impaired glucose tolerance)        Benign essential hypertension        Hypercholesterolemia        Tachycardia since 1978          Care Instructions    1. Call for your colonoscopy at [facility]   For change in bowel habits   [phone]    2. Stop the calcium pills   Eat calcium : dairy and greens  Do not take calcium in pill form as it can plaque on the heart arteries and cause kidney stones    3. The only way known to prevent diabetes or keep it from getting worse is exercise, 20-40 minutes 3 times a day around the time of meals as your insulin is wearing out  You need to get rid of the sugar using your muscles                     Follow-ups after your visit        Additional Services     GASTROENTEROLOGY ADULT REF PROCEDURE ONLY       Last Lab Result: Creatinine (mg/dL)       Date                     Value                 02/16/2017               0.90             ----------  Body mass index is 34.28 kg/(m^2).     Needed:  No  Language:  English    Patient will be contacted to schedule procedure.     Please be aware that coverage of these services is subject to the terms and limitations of your health insurance plan.  Call member services at your health plan with any benefit or coverage questions.  Any procedures must be performed at a [facility] facility OR coordinated by your clinic's referral office.    Please bring the following with you to your  "appointment:    (1) Any X-Rays, CTs or MRIs which have been performed.  Contact the facility where they were done to arrange for  prior to your scheduled appointment.    (2) List of current medications   (3) This referral request   (4) Any documents/labs given to you for this referral                  Follow-up notes from your care team     Return in about 6 months (around 2/3/2018) for Routine Visit.      Who to contact     If you have questions or need follow up information about today's clinic visit or your schedule please contact [facility] directly at [phone].  Normal or non-critical lab and imaging results will be communicated to you by [employer], letter or phone within 4 business days after the clinic has received the results. If you do not hear from us within 7 days, please contact the clinic through [employer] or phone. If you have a critical or abnormal lab result, we will notify you by phone as soon as possible.  Submit refill requests through [employer] or call your pharmacy and they will forward the refill request to us. Please allow 3 business days for your refill to be completed.          Additional Information About Your Visit        [employer] Information     [employer] lets you send messages to your doctor, view your test results, renew your prescriptions, schedule appointments and more. To sign up, go to [email or web address] . Click on \"Log in\" on the left side of the screen, which will take you to the Welcome page. Then click on \"Sign up Now\" on the right side of the page.     You will be asked to enter the access code listed below, as well as some personal information. Please follow the directions to create your username and password.     Your access code is: [email or web address]  Expires: 2017 10:43 AM     Your access code will  in 90 days. If you need help or a new code, please call your [facility] or [phone].        Care EveryWhere ID     This is " "your Care EveryWhere ID. This could be used by other organizations to access your [facility] medical records  [id number]        Your Vitals Were     Pulse Temperature Respirations Height Last Period Pulse Oximetry    50 97.1  F (36.2  C) (Tympanic) 12 5' 5\" (1.651 m) (LMP Unknown) 98%    Breastfeeding? BMI (Body Mass Index)                No 34.28 kg/m2           Blood Pressure from Last 3 Encounters:   08/03/17 128/74   02/16/17 120/82   07/01/16 128/60    Weight from Last 3 Encounters:   08/03/17 206 lb (93.4 kg)   02/16/17 204 lb (92.5 kg)   07/01/16 197 lb (89.4 kg)              We Performed the Following     ALT     GASTROENTEROLOGY ADULT REF PROCEDURE ONLY     Hemoglobin A1c     LDL cholesterol direct     TSH with free T4 reflex          Today's Medication Changes          These changes are accurate as of: 8/3/17 10:43 AM.  If you have any questions, ask your nurse or doctor.               These medicines have changed or have updated prescriptions.        Dose/Directions    aspirin 325 MG EC tablet   This may have changed:    - how much to take  - when to take this   Used for:  Failed total knee arthroplasty, initial encounter (H)        Dose:  325 mg   Take 1 tablet (325 mg) by mouth 2 times daily (with meals)   Quantity:  60 tablet   Refills:  0            Where to get your medicines      These medications were sent to [facility]/pharmacy #[id number] - [facility] [address] [address]     Phone:  [phone]     metoprolol 25 MG 24 hr tablet    simvastatin 40 MG tablet                Primary Care Provider Office Phone # Fax #    Yoselin Mancera [phone]       [facility] [address]  [facility] [address]        Equal Access to Services     ALLI CAMPOS : Dieudonne Cowart, waaxda luqadaha, qaybta kaalmada vinayyabuffy, yasmin loving. So [facility] [phone].    ATENCIÓN: Si casie caraballo, " tiene a severino disposición servicios gratuitos de asistencia lingüística. Bogdan escobar [phone].    We comply with applicable federal civil rights laws and [place] laws. We do not discriminate on the basis of race, color, national origin, age, disability sex, sexual orientation or gender identity.            Thank you!     Thank you for choosing [facility]  for your care. Our goal is always to provide you with excellent care. Hearing back from our patients is one way we can continue to improve our services. Please take a few minutes to complete the written survey that you may receive in the mail after your visit with us. Thank you!             Your Updated Medication List - Protect others around you: Learn how to safely use, store and throw away your medicines at www.disposemymeds.org.          This list is accurate as of: 8/3/17 10:43 AM.  Always use your most recent med list.                   Brand Name Dispense Instructions for use Diagnosis    aspirin 325 MG EC tablet     60 tablet    Take 1 tablet (325 mg) by mouth 2 times daily (with meals)    Failed total knee arthroplasty, initial encounter (H)       calcium-vitamin D 500-125 MG-UNIT Tabs      Take 6 tablets by mouth daily        Cranberry 200 MG Caps      Take 1 tablet by mouth daily        FLINTSTONES COMPLETE PO      Take 1 tablet by mouth 2 times daily        levothyroxine 50 MCG tablet    SYNTHROID/LEVOTHROID    90 tablet    TAKE ONE TABLET BY MOUTH DAILY    Hypothyroidism due to acquired atrophy of thyroid       metoprolol 25 MG 24 hr tablet    TOPROL-XL    90 tablet    Take 1 tablet (25 mg) by mouth daily    Tachycardia, Benign essential hypertension       OMEPRAZOLE PO      Take 20 mg by mouth daily        simvastatin 40 MG tablet    ZOCOR    90 tablet    Take 1 tablet (40 mg) by mouth daily    Hypercholesterolemia       terbinafine 1 % cream    lamISIL     Apply topically daily as needed Applies to spot on forehead         VITAMIN B 12 PO      Take 2,500 mcg by mouth daily sublingual        vitamin D 1000 UNITS capsule      Take 1 capsule by mouth daily

## 2017-08-03 NOTE — LETTER
Aurora STAPLES Emmanuelbarbiepadmini  [address] [address]        August 10, 2017          Dear ,    We are writing to inform you of your test results.    They are all normal     THE FOLLOWING ARE EXPLANATIONS OF SOME OF OUR LAB TESTS     YOU DID NOT NECESSARILY HAVE ALL OF THESE DONE     Hgb is the blood iron level   WBC means White Blood Cells   Platelets are small blood cells that help with forming the blood clots along with other blood factors.   Electrolytes are Sodium, Potassium, Calcium, Magnesium, Phosphorus.   Liver tests are: AST, ALT, Bilirubin, Alkaline Phosphatase.   Kidney tests are Creatinine, GFR.   HDL Cholesterol - is the good cholesterol and it is good to have it high.   LDL cholesterol is the bad cholesterol and it is good to have it low.   It is recommended to have LDL less than 130 for people with hypertension and to have it less than 100 for people with heart disease, diabetes and chronic kidney disease.   Triglycerides are another type of lipid that can cause heart disease, like the cholesterol and should be kept low   Thyroid tests are TSH, T4, T3   Glucose is sugar.   A1c is a test that gives us an idea about how well was controlled the diabetes for the last 3 months.   PSA stands for Prostate Specific Antigen and it can be elevated with prostate cancer or prostate inflammation.     Please continue on the same medications     Resulted Orders   Hemoglobin A1c   Result Value Ref Range    Hemoglobin A1C 5.5 4.3 - 6.0 %   ALT   Result Value Ref Range    ALT 27 0 - 50 U/L   LDL cholesterol direct   Result Value Ref Range    LDL Cholesterol Direct 88 <100 mg/dL      Comment:      Desirable:       <100 mg/dl   TSH with free T4 reflex   Result Value Ref Range    TSH 2.50 0.40 - 4.00 mU/L       If you have any questions or concerns, please call the clinic at the number listed above.       Sincerely,        Yoselin Mancera MD

## 2017-09-05 ENCOUNTER — TELEPHONE (OUTPATIENT)
Dept: FAMILY MEDICINE | Facility: CLINIC | Age: 77
End: 2017-09-05

## 2017-09-07 ENCOUNTER — TRANSFERRED RECORDS (OUTPATIENT)
Dept: HEALTH INFORMATION MANAGEMENT | Facility: CLINIC | Age: 77
End: 2017-09-07

## 2017-10-29 DIAGNOSIS — E03.4 HYPOTHYROIDISM DUE TO ACQUIRED ATROPHY OF THYROID: Chronic | ICD-10-CM

## 2017-10-31 RX ORDER — LEVOTHYROXINE SODIUM 50 UG/1
50 TABLET ORAL DAILY
Qty: 90 TABLET | Refills: 2 | Status: SHIPPED | OUTPATIENT
Start: 2017-10-31 | End: 2018-07-23

## 2018-04-03 ENCOUNTER — HOSPITAL ENCOUNTER (OUTPATIENT)
Dept: MAMMOGRAPHY | Facility: CLINIC | Age: 78
Discharge: HOME | End: 2018-04-03
Attending: FAMILY MEDICINE | Admitting: FAMILY MEDICINE
Payer: MEDICARE

## 2018-04-03 DIAGNOSIS — Z12.31 VISIT FOR SCREENING MAMMOGRAM: ICD-10-CM

## 2018-04-03 PROCEDURE — 77067 SCR MAMMO BI INCL CAD: CPT

## 2018-07-23 DIAGNOSIS — I10 BENIGN ESSENTIAL HYPERTENSION: ICD-10-CM

## 2018-07-23 DIAGNOSIS — E03.4 HYPOTHYROIDISM DUE TO ACQUIRED ATROPHY OF THYROID: Chronic | ICD-10-CM

## 2018-07-24 RX ORDER — METOPROLOL SUCCINATE 25 MG/1
TABLET, EXTENDED RELEASE ORAL
Qty: 90 TABLET | Refills: 1 | Status: SHIPPED | OUTPATIENT
Start: 2018-07-24 | End: 2019-01-20

## 2018-07-24 RX ORDER — LEVOTHYROXINE SODIUM 50 UG/1
TABLET ORAL
Qty: 90 TABLET | Refills: 2 | Status: SHIPPED | OUTPATIENT
Start: 2018-07-24 | End: 2019-05-09

## 2018-07-24 NOTE — TELEPHONE ENCOUNTER
Medication is being filled for 1 time refill only due to:  Patient needs to be seen because annual labs and office due in August,call to schedule appointment..

## 2018-07-24 NOTE — TELEPHONE ENCOUNTER
"Requested Prescriptions   Pending Prescriptions Disp Refills     levothyroxine (SYNTHROID/LEVOTHROID) 50 MCG tablet [Pharmacy Med Name: LEVOTHYROXINE 50 MCG TABLET]  Last Written Prescription Date:  10/31/17  Last Fill Quantity: 90,  # refills: 2   Last office visit: 8/3/2017 with prescribing provider:  Calderon   Future Office Visit:     90 tablet 2     Sig: TAKE 1 TABLET (50 MCG) BY MOUTH DAILY    Thyroid Protocol Passed    7/23/2018  1:58 AM       Passed - Patient is 12 years or older       Passed - Recent (12 mo) or future (30 days) visit within the authorizing provider's specialty    Patient had office visit in the last 12 months or has a visit in the next 30 days with authorizing provider or within the authorizing provider's specialty.  See \"Patient Info\" tab in inbasket, or \"Choose Columns\" in Meds & Orders section of the refill encounter.           Passed - Normal TSH on file in past 12 months    Recent Labs   Lab Test  08/03/17   1049   TSH  2.50             Passed - No active pregnancy on record    If patient is pregnant or has had a positive pregnancy test, please check TSH.         Passed - No positive pregnancy test in past 12 months    If patient is pregnant or has had a positive pregnancy test, please check TSH.          metoprolol succinate (TOPROL-XL) 25 MG 24 hr tablet [Pharmacy Med Name: METOPROLOL SUCC ER 25 MG TAB]  Last Written Prescription Date:  8/3/17  Last Fill Quantity: 90,  # refills: 1   Last office visit: 8/3/2017 with prescribing provider:  Calderon   Future Office Visit:     90 tablet 1     Sig: TAKE 1 TABLET (25 MG) BY MOUTH DAILY    Beta-Blockers Protocol Passed    7/23/2018  1:58 AM       Passed - Blood pressure under 140/90 in past 12 months    BP Readings from Last 3 Encounters:   08/03/17 128/74   02/16/17 120/82   07/01/16 128/60                Passed - Patient is age 6 or older       Passed - Recent (12 mo) or future (30 days) visit within the authorizing provider's specialty " "   Patient had office visit in the last 12 months or has a visit in the next 30 days with authorizing provider or within the authorizing provider's specialty.  See \"Patient Info\" tab in inbasket, or \"Choose Columns\" in Meds & Orders section of the refill encounter.              "

## 2018-07-30 ENCOUNTER — OFFICE VISIT (OUTPATIENT)
Dept: FAMILY MEDICINE | Facility: CLINIC | Age: 78
End: 2018-07-30
Payer: MEDICARE

## 2018-07-30 VITALS
WEIGHT: 208 LBS | DIASTOLIC BLOOD PRESSURE: 72 MMHG | HEART RATE: 48 BPM | BODY MASS INDEX: 34.61 KG/M2 | SYSTOLIC BLOOD PRESSURE: 124 MMHG | RESPIRATION RATE: 16 BRPM | TEMPERATURE: 97.8 F | OXYGEN SATURATION: 97 %

## 2018-07-30 DIAGNOSIS — H60.392 INFECTIVE OTITIS EXTERNA, LEFT: Primary | ICD-10-CM

## 2018-07-30 DIAGNOSIS — Z23 NEED FOR PROPHYLACTIC VACCINATION WITH TETANUS-DIPHTHERIA (TD): ICD-10-CM

## 2018-07-30 PROCEDURE — 90471 IMMUNIZATION ADMIN: CPT | Performed by: PHYSICIAN ASSISTANT

## 2018-07-30 PROCEDURE — 90714 TD VACC NO PRESV 7 YRS+ IM: CPT | Performed by: PHYSICIAN ASSISTANT

## 2018-07-30 PROCEDURE — 99213 OFFICE O/P EST LOW 20 MIN: CPT | Mod: 25 | Performed by: PHYSICIAN ASSISTANT

## 2018-07-30 RX ORDER — NEOMYCIN SULFATE, POLYMYXIN B SULFATE AND HYDROCORTISONE 10; 3.5; 1 MG/ML; MG/ML; [USP'U]/ML
4 SUSPENSION/ DROPS AURICULAR (OTIC) 4 TIMES DAILY
Qty: 10 ML | Refills: 0 | Status: SHIPPED | OUTPATIENT
Start: 2018-07-30 | End: 2018-10-11

## 2018-07-30 ASSESSMENT — ENCOUNTER SYMPTOMS
PSYCHIATRIC NEGATIVE: 1
EYES NEGATIVE: 1
ENDOCRINE NEGATIVE: 1
GASTROINTESTINAL NEGATIVE: 1
NEUROLOGICAL NEGATIVE: 1
CARDIOVASCULAR NEGATIVE: 1
MUSCULOSKELETAL NEGATIVE: 1

## 2018-07-30 NOTE — PROGRESS NOTES
SUBJECTIVE:   Aurora Figueroa is a 78 year old female who presents to clinic today for the following health issues:    Concern - ear problem  Onset: 1 week    Description:   Patient is here today for pain in the left ear, no drainage and no other cold sxs    Intensity: mild    Progression of Symptoms:  same    Accompanying Signs & Symptoms:  See above; more bothersome at night    Previous history of similar problem:   n/a    Precipitating factors:   Worsened by: n/a    Alleviating factors:  Improved by: n/a    Therapies Tried and outcome: tylenol      Problem list and histories reviewed & adjusted, as indicated.  Additional history: as documented    Patient Active Problem List   Diagnosis     B-complex deficiency     Trigger finger     Ganglion cyst     Bariatric surgery status - 2009     Glucose intolerance (impaired glucose tolerance)     Hypothyroidism due to acquired atrophy of thyroid     ACP (advance care planning)     Failed total knee arthroplasty, initial encounter (H)     Anemia due to blood loss, acute     Benign essential hypertension     Status post total right knee replacement on 2/4/16 by Vincent Allen MD     Acute cystitis with hematuriam w hx of recurrence      Non morbid obesity due to excess calories s/po bariatric surg 2009 w comorbid HTN, hi LDL     Hypercholesterolemia     Tachycardia since 1978     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     GYN SURGERY  1978    c section     HERNIA REPAIR  1990    umbilical 1990     ORTHOPEDIC SURGERY  1993, 1995    arthroscopic     ORTHOPEDIC SURGERY  1996    Bilateral TKA     ORTHOPEDIC SURGERY  2008    Left hip replacement.       Social History   Substance Use Topics     Smoking status: Never Smoker     Smokeless tobacco: Never Used     Alcohol use 0.0 oz/week     0 Standard drinks or equivalent per week      Comment: rare     Family History   Problem Relation Age of Onset     HEART DISEASE Mother      HEART DISEASE Son      Unknown/Adopted Father      HEART DISEASE Sister      Diabetes No family hx of      Coronary Artery Disease No family hx of      Hypertension No family hx of      Hyperlipidemia No family hx of      Cerebrovascular Disease No family hx of      Breast Cancer No family hx of      Colon Cancer No family hx of      Prostate Cancer No family hx of      Other Cancer No family hx of      Depression No family hx of      Anxiety Disorder No family hx of      Mental Illness No family hx of      Substance Abuse No family hx of      Anesthesia Reaction No family hx of      Asthma No family hx of      Osteoperosis No family hx of      Genetic Disorder No family hx of      Thyroid Disease No family hx of      Obesity No family hx of          Current Outpatient Prescriptions   Medication Sig Dispense Refill     aspirin 325 MG EC tablet Take 1 tablet (325 mg) by mouth 2 times daily (with meals) (Patient taking differently: Take 81 mg by mouth daily ) 60 tablet 0     Cholecalciferol (VITAMIN D) 1000 UNITS capsule Take 1 capsule by mouth daily        Cyanocobalamin (VITAMIN B 12 PO) Take 2,500 mcg by mouth daily sublingual       levothyroxine (SYNTHROID/LEVOTHROID) 50 MCG tablet TAKE 1 TABLET (50 MCG) BY MOUTH DAILY 90 tablet 2     metoprolol succinate (TOPROL-XL) 25 MG 24 hr tablet TAKE 1 TABLET (25 MG) BY MOUTH DAILY 90 tablet 1     neomycin-polymyxin-hydrocortisone  (CORTISPORIN) 3.5-27461-9 otic suspension Place 4 drops Into the left ear 4 times daily 10 mL 0     OMEPRAZOLE PO Take 20 mg by mouth daily        Pediatric Multivit-Minerals-C (FLINTSTONES COMPLETE PO) Take 1 tablet by mouth 2 times daily       simvastatin (ZOCOR) 40 MG tablet Take 1 tablet (40 mg) by mouth daily 90 tablet 1     terbinafine (LAMISIL) 1 % cream Apply topically daily as needed Applies to spot on forehead       calcium-vitamin D 500-125 MG-UNIT TABS Take 6 tablets by mouth daily        Cranberry 200 MG CAPS Take 1 tablet by mouth daily        [DISCONTINUED] simvastatin (ZOCOR) 40 MG tablet TAKE 1 TABLET (40 MG) BY MOUTH DAILY 90 tablet 1     Allergies   Allergen Reactions     Penicillins Hives     Animal Dander      Pruritis,itchi eyes, throat and ears.     E.E.S. GI Disturbance     Erythromycin GI Disturbance     Fruit [Roberson]      Hives, itchy throat tomatoes     Pollen Extract Itching       Reviewed and updated as needed this visit by clinical staff  Tobacco  Meds  Med Hx  Surg Hx  Fam Hx  Soc Hx      Reviewed and updated as needed this visit by Provider         Review of Systems   Constitutional:        As in HPI   HENT:        As in HPI   Eyes: Negative.    Respiratory:        As in HPI   Cardiovascular: Negative.    Gastrointestinal: Negative.    Endocrine: Negative.    Genitourinary: Negative.    Musculoskeletal: Negative.    Skin: Negative.    Neurological: Negative.    Psychiatric/Behavioral: Negative.        OBJECTIVE:     /72  Pulse (!) 48  Temp 97.8  F (36.6  C) (Tympanic)  Resp 16  Wt 208 lb (94.3 kg)  LMP  (LMP Unknown)  SpO2 97%  BMI 34.61 kg/m2  Body mass index is 34.61 kg/(m^2).    Physical Exam   Constitutional: She is oriented to person, place, and time. She appears well-developed and well-nourished.   HENT:   Head: Normocephalic.   Right Ear: Tympanic membrane, external ear and ear canal normal.   Left Ear: External ear normal. There is drainage and swelling. No  tenderness. No mastoid tenderness. Tympanic membrane is injected. Tympanic membrane is not erythematous and not bulging.  No middle ear effusion.   Nose: No mucosal edema or rhinorrhea.   Mouth/Throat: Uvula is midline. No oropharyngeal exudate, posterior oropharyngeal edema or posterior oropharyngeal erythema.   Eyes: Conjunctivae and EOM are normal.   Pulmonary/Chest: Effort normal.   Neurological: She is alert and oriented to person, place, and time.   Skin: Skin is warm.   Psychiatric: She has a normal mood and affect.       No results found for this or any previous visit (from the past 24 hour(s)).    ASSESSMENT/PLAN:       ICD-10-CM    1. Infective otitis externa, left H60.392 neomycin-polymyxin-hydrocortisone (CORTISPORIN) 3.5-62981-8 otic suspension   2. Need for prophylactic vaccination with tetanus-diphtheria (TD) Z23 TD (ADULT, 7+) PRESERVE FREE        There are no Patient Instructions on file for this visit.    Ok Ching PA-C  [facility]

## 2018-07-30 NOTE — MR AVS SNAPSHOT
After Visit Summary   7/30/2018    Aurora Figueroa    MRN: [id number]           Patient Information     Date Of Birth          1940        Visit Information        Provider Department      7/30/2018 8:50 AM Cielo Ching PA-C [facility]        Today's Diagnoses     Infective otitis externa, left    -  1    Need for prophylactic vaccination with tetanus-diphtheria (TD)           Follow-ups after your visit        Your next 10 appointments already scheduled     Oct 11, 2018  7:30 AM CDT   PHYSICAL with Cielo Ching PA-C   [facility]    [address] [address] [phone]              Who to contact     If you have questions or need follow up information about today's clinic visit or your schedule please contact [facility] directly at [phone].  Normal or non-critical lab and imaging results will be communicated to you by MyChart, letter or phone within 4 business days after the clinic has received the results. If you do not hear from us within 7 days, please contact the clinic through [employer] or phone. If you have a critical or abnormal lab result, we will notify you by phone as soon as possible.  Submit refill requests through [employer] or call your pharmacy and they will forward the refill request to us. Please allow 3 business days for your refill to be completed.          Additional Information About Your Visit        Care EveryWhere ID     This is your Care EveryWhere ID. This could be used by other organizations to access your [place] medical records  [id number]        Your Vitals Were     Pulse Temperature Respirations Last Period Pulse Oximetry BMI (Body Mass Index)    48 97.8  F (36.6  C) (Tympanic) 16 (LMP Unknown) 97% 34.61 kg/m2       Blood Pressure from Last 3 Encounters:    07/30/18 124/72   08/03/17 128/74   02/16/17 120/82    Weight from Last 3 Encounters:   07/30/18 208 lb (94.3 kg)   08/03/17 206 lb (93.4 kg)   02/16/17 204 lb (92.5 kg)              We Performed the Following     TD (ADULT, 7+) PRESERVE FREE          Today's Medication Changes          These changes are accurate as of 7/30/18 11:00 AM.  If you have any questions, ask your nurse or doctor.               Start taking these medicines.        Dose/Directions    neomycin-polymyxin-hydrocortisone 3.5-14215-2 otic suspension   Commonly known as:  CORTISPORIN   Used for:  Infective otitis externa, left   Started by:  Cielo Ching PA-C        Dose:  4 drop   Place 4 drops Into the left ear 4 times daily   Quantity:  10 mL   Refills:  0         These medicines have changed or have updated prescriptions.        Dose/Directions    aspirin 325 MG EC tablet   This may have changed:    - how much to take  - when to take this   Used for:  Failed total knee arthroplasty, initial encounter (H)        Dose:  325 mg   Take 1 tablet (325 mg) by mouth 2 times daily (with meals)   Quantity:  60 tablet   Refills:  0            Where to get your medicines      These medications were sent to [facility]/pharmacy #[id number] - [place] [address] [address] [address]     Phone:  [phone]     neomycin-polymyxin-hydrocortisone 3.5-61143-6 otic suspension                Primary Care Provider Office Phone # Fax #    Yoselin Mancera [phone] [address]  [facility] [address]        Equal Access to Services     AKI CAMPOS AH: Hadii phyllis Cowart, waaxda luqadaha, qaybta kaalmabuffy lawrence, yasmin loving. So [facility] [phone].    ATENCIÓN: Si habla español, tiene a severino disposición servicios gratuitos de asistencia lingüística. Bogdan escobar [phone].    We comply with applicable federal civil rights laws and [place] laws.  We do not discriminate on the basis of race, color, national origin, age, disability, sex, sexual orientation, or gender identity.            Thank you!     Thank you for choosing [facility]  for your care. Our goal is always to provide you with excellent care. Hearing back from our patients is one way we can continue to improve our services. Please take a few minutes to complete the written survey that you may receive in the mail after your visit with us. Thank you!             Your Updated Medication List - Protect others around you: Learn how to safely use, store and throw away your medicines at www.disposemymeds.org.          This list is accurate as of 7/30/18 11:00 AM.  Always use your most recent med list.                   Brand Name Dispense Instructions for use Diagnosis    aspirin 325 MG EC tablet     60 tablet    Take 1 tablet (325 mg) by mouth 2 times daily (with meals)    Failed total knee arthroplasty, initial encounter (H)       calcium-vitamin D 500-125 MG-UNIT Tabs      Take 6 tablets by mouth daily        cranberry 200 MG Caps capsule      Take 1 tablet by mouth daily        FLINTSTONES COMPLETE PO      Take 1 tablet by mouth 2 times daily        levothyroxine 50 MCG tablet    SYNTHROID/LEVOTHROID    90 tablet    TAKE 1 TABLET (50 MCG) BY MOUTH DAILY    Hypothyroidism due to acquired atrophy of thyroid       metoprolol succinate 25 MG 24 hr tablet    TOPROL-XL    90 tablet    TAKE 1 TABLET (25 MG) BY MOUTH DAILY    Benign essential hypertension       neomycin-polymyxin-hydrocortisone 3.5-52194-4 otic suspension    CORTISPORIN    10 mL    Place 4 drops Into the left ear 4 times daily    Infective otitis externa, left       OMEPRAZOLE PO      Take 20 mg by mouth daily        simvastatin 40 MG tablet    ZOCOR    90 tablet    Take 1 tablet (40 mg) by mouth daily    Hypercholesterolemia       terbinafine 1 % cream    lamISIL     Apply topically daily as needed Applies to  spot on forehead        VITAMIN B 12 PO      Take 2,500 mcg by mouth daily sublingual        vitamin D 1000 units capsule      Take 1 capsule by mouth daily

## 2018-07-30 NOTE — NURSING NOTE
Screening Questionnaire for Adult Immunization    Are you sick today?   No   Do you have allergies to medications, food, a vaccine component or latex?   No   Have you ever had a serious reaction after receiving a vaccination?   No   Do you have a long-term health problem with heart disease, lung disease, asthma, kidney disease, metabolic disease (e.g. diabetes), anemia, or other blood disorder?   No   Do you have cancer, leukemia, HIV/AIDS, or any other immune system problem?   No   In the past 3 months, have you taken medications that affect  your immune system, such as prednisone, other steroids, or anticancer drugs; drugs for the treatment of rheumatoid arthritis, Crohn s disease, or psoriasis; or have you had radiation treatments?   No   Have you had a seizure, or a brain or other nervous system problem?   No   During the past year, have you received a transfusion of blood or blood     products, or been given immune (gamma) globulin or antiviral drug?   No   For women: Are you pregnant or is there a chance you could become        pregnant during the next month?   No   Have you received any vaccinations in the past 4 weeks?   No     Immunization questionnaire answers were all negative.        Per orders of Ok Ching, injection of TD given by Yasmeen Caruso. Patient instructed to remain in clinic for 15 minutes afterwards, and to report any adverse reaction to me immediately.       Screening performed by Yasmeen Caruso on 7/30/2018 at 9:20 AM.

## 2018-08-10 ENCOUNTER — OFFICE VISIT (OUTPATIENT)
Dept: INTERNAL MEDICINE | Facility: CLINIC | Age: 78
End: 2018-08-10
Payer: MEDICARE

## 2018-08-10 VITALS
HEART RATE: 58 BPM | RESPIRATION RATE: 16 BRPM | BODY MASS INDEX: 35.06 KG/M2 | DIASTOLIC BLOOD PRESSURE: 70 MMHG | TEMPERATURE: 98.3 F | SYSTOLIC BLOOD PRESSURE: 120 MMHG | OXYGEN SATURATION: 97 % | WEIGHT: 210.7 LBS

## 2018-08-10 DIAGNOSIS — H10.32 ACUTE CONJUNCTIVITIS OF LEFT EYE, UNSPECIFIED ACUTE CONJUNCTIVITIS TYPE: Primary | ICD-10-CM

## 2018-08-10 DIAGNOSIS — H00.015 HORDEOLUM EXTERNUM OF LEFT LOWER EYELID: ICD-10-CM

## 2018-08-10 PROCEDURE — 99213 OFFICE O/P EST LOW 20 MIN: CPT | Performed by: INTERNAL MEDICINE

## 2018-08-10 RX ORDER — SULFACETAMIDE SODIUM 100 MG/ML
1 SOLUTION/ DROPS OPHTHALMIC
Qty: 1 BOTTLE | Refills: 0 | Status: SHIPPED | OUTPATIENT
Start: 2018-08-10 | End: 2018-08-17

## 2018-08-10 NOTE — PROGRESS NOTES
SUBJECTIVE:   Aurora Figueroa is a 78 year old female who presents to clinic today for the following health issues:      Eye(s) Problem      Duration: x2 weeks, hot packed it but, then seemed to appear this morning    Description:  Location: left  Pain: YES  Redness: YES  Discharge: YES    Accompanying signs and symptoms: Runny nose this am but, seems to be better    History (Trauma, foreign body exposure,): None    Precipitating or alleviating factors (contact use): None    Therapies tried and outcome: None          Problem list and histories reviewed & adjusted, as indicated.  Additional history: as documented        Reviewed and updated as needed this visit by clinical staff  Tobacco  Allergies  Meds       Reviewed and updated as needed this visit by Provider         ROS:  Constitutional, HEENT, cardiovascular, pulmonary, gi and gu systems are negative, except as otherwise noted.    OBJECTIVE:     /70  Pulse 58  Temp 98.3  F (36.8  C) (Oral)  Resp 16  Wt 210 lb 11.2 oz (95.6 kg)  LMP  (LMP Unknown)  SpO2 97%  BMI 35.06 kg/m2  Body mass index is 35.06 kg/(m^2).  GENERAL: healthy, alert and no distress  EYES: Stye of left lower lid, with associated conjunctivitis  NECK: no adenopathy, no asymmetry, masses, or scars and thyroid normal to palpation  RESP: lungs clear to auscultation - no rales, rhonchi or wheezes  CV: regular rate and rhythm, normal S1 S2, no S3 or S4, no murmur, click or rub, no peripheral edema and peripheral pulses strong  ABDOMEN: soft, nontender, no hepatosplenomegaly, no masses and bowel sounds normal  MS: no gross musculoskeletal defects noted, no edema        ASSESSMENT/PLAN:     1. Acute conjunctivitis of left eye, unspecified acute conjunctivitis type  With associated conjunctivitis, topical Abx as prescribed.  - sulfacetamide (BLEPH-10) 10 % ophthalmic solution; Apply 1 drop to eye every 4 hours (while awake) for 7 days  Dispense: 1 Bottle; Refill: 0    2. Hordeolum  externum of left lower eyelid  Continue warm packs, avoidance of eye make-up          Jason Call MD  [facility]

## 2018-08-10 NOTE — MR AVS SNAPSHOT
After Visit Summary   8/10/2018    Aurora Figueroa    MRN: [id number]           Patient Information     Date Of Birth          1940        Visit Information        Provider Department      8/10/2018 4:30 PM Jason Call MD [facility]        Today's Diagnoses     Acute conjunctivitis of left eye, unspecified acute conjunctivitis type    -  1    Hordeolum externum of left lower eyelid           Follow-ups after your visit        Your next 10 appointments already scheduled     Oct 11, 2018  7:30 AM CDT   PHYSICAL with Cielo Ching PA-C   [facility]    [address] [place] [address] [phone]              Who to contact     If you have questions or need follow up information about today's clinic visit or your schedule please contact [facility] directly at [phone].  Normal or non-critical lab and imaging results will be communicated to you by MyChart, letter or phone within 4 business days after the clinic has received the results. If you do not hear from us within 7 days, please contact the clinic through MyChart or phone. If you have a critical or abnormal lab result, we will notify you by phone as soon as possible.  Submit refill requests through [employer] or call your pharmacy and they will forward the refill request to us. Please allow 3 business days for your refill to be completed.          Additional Information About Your Visit        Care EveryWhere ID     This is your Care EveryWhere ID. This could be used by other organizations to access your [place] medical records  [id number]        Your Vitals Were     Pulse Temperature Respirations Last Period Pulse Oximetry BMI (Body Mass Index)    58 98.3  F (36.8  C) (Oral) 16 (LMP Unknown) 97% 35.06 kg/m2       Blood Pressure from Last 3 Encounters:   08/10/18  120/70   07/30/18 124/72   08/03/17 128/74    Weight from Last 3 Encounters:   08/10/18 210 lb 11.2 oz (95.6 kg)   07/30/18 208 lb (94.3 kg)   08/03/17 206 lb (93.4 kg)              Today, you had the following     No orders found for display         Today's Medication Changes          These changes are accurate as of 8/10/18  5:11 PM.  If you have any questions, ask your nurse or doctor.               Start taking these medicines.        Dose/Directions    sulfacetamide 10 % ophthalmic solution   Commonly known as:  BLEPH-10   Used for:  Acute conjunctivitis of left eye, unspecified acute conjunctivitis type   Started by:  Jason Call MD        Dose:  1 drop   Apply 1 drop to eye every 4 hours (while awake) for 7 days   Quantity:  1 Bottle   Refills:  0         These medicines have changed or have updated prescriptions.        Dose/Directions    aspirin 325 MG EC tablet   This may have changed:    - how much to take  - when to take this   Used for:  Failed total knee arthroplasty, initial encounter (H)        Dose:  325 mg   Take 1 tablet (325 mg) by mouth 2 times daily (with meals)   Quantity:  60 tablet   Refills:  0            Where to get your medicines      These medications were sent to [facility]/pharmacy #[id number] - [place], [place] - [address] [address]     Phone:  [phone]     sulfacetamide 10 % ophthalmic solution                Primary Care Provider Office Phone # Fax #    Yoselin Mancera [phone] [address]  [facility] [address]        Equal Access to Services     [facility]: Hadii phyllis ku hadasho Soomaali, waaxda luqadaha, qaybta kaalmada adeegyabuffy, yasmin idimarcie loving. So [facility] [phone].    ATENCIÓN: Si habla español, tiene a severino disposición servicios gratuitos de asistencia lingüística. Llame al [phone].    We comply with applicable federal civil rights laws and [place] laws. We  do not discriminate on the basis of race, color, national origin, age, disability, sex, sexual orientation, or gender identity.            Thank you!     Thank you for choosing [facility]  for your care. Our goal is always to provide you with excellent care. Hearing back from our patients is one way we can continue to improve our services. Please take a few minutes to complete the written survey that you may receive in the mail after your visit with us. Thank you!             Your Updated Medication List - Protect others around you: Learn how to safely use, store and throw away your medicines at www.disposemymeds.org.          This list is accurate as of 8/10/18  5:11 PM.  Always use your most recent med list.                   Brand Name Dispense Instructions for use Diagnosis    aspirin 325 MG EC tablet     60 tablet    Take 1 tablet (325 mg) by mouth 2 times daily (with meals)    Failed total knee arthroplasty, initial encounter (H)       calcium-vitamin D 500-125 MG-UNIT Tabs      Take 6 tablets by mouth daily        cranberry 200 MG Caps capsule      Take 1 tablet by mouth daily        FLINTSTONES COMPLETE PO      Take 1 tablet by mouth 2 times daily        levothyroxine 50 MCG tablet    SYNTHROID/LEVOTHROID    90 tablet    TAKE 1 TABLET (50 MCG) BY MOUTH DAILY    Hypothyroidism due to acquired atrophy of thyroid       metoprolol succinate 25 MG 24 hr tablet    TOPROL-XL    90 tablet    TAKE 1 TABLET (25 MG) BY MOUTH DAILY    Benign essential hypertension       neomycin-polymyxin-hydrocortisone 3.5-16572-7 otic suspension    CORTISPORIN    10 mL    Place 4 drops Into the left ear 4 times daily    Infective otitis externa, left       OMEPRAZOLE PO      Take 20 mg by mouth daily        simvastatin 40 MG tablet    ZOCOR    90 tablet    Take 1 tablet (40 mg) by mouth daily    Hypercholesterolemia       sulfacetamide 10 % ophthalmic solution    BLEPH-10    1 Bottle    Apply 1 drop to eye  every 4 hours (while awake) for 7 days    Acute conjunctivitis of left eye, unspecified acute conjunctivitis type       terbinafine 1 % cream    lamISIL     Apply topically daily as needed Applies to spot on forehead        VITAMIN B 12 PO      Take 2,500 mcg by mouth daily sublingual        vitamin D 1000 units capsule      Take 1 capsule by mouth daily

## 2018-09-05 ENCOUNTER — OFFICE VISIT (OUTPATIENT)
Dept: DERMATOLOGY | Facility: CLINIC | Age: 78
End: 2018-09-05
Payer: MEDICARE

## 2018-09-05 VITALS — DIASTOLIC BLOOD PRESSURE: 79 MMHG | HEART RATE: 52 BPM | SYSTOLIC BLOOD PRESSURE: 135 MMHG | OXYGEN SATURATION: 97 %

## 2018-09-05 DIAGNOSIS — L57.0 KERATOSIS, ACTINIC: ICD-10-CM

## 2018-09-05 DIAGNOSIS — D18.01 CHERRY ANGIOMA: ICD-10-CM

## 2018-09-05 DIAGNOSIS — D48.5 NEOPLASM OF UNCERTAIN BEHAVIOR OF SKIN: Primary | ICD-10-CM

## 2018-09-05 DIAGNOSIS — D22.9 MULTIPLE NEVI: ICD-10-CM

## 2018-09-05 PROCEDURE — 17003 DESTRUCT PREMALG LES 2-14: CPT | Performed by: PHYSICIAN ASSISTANT

## 2018-09-05 PROCEDURE — 99213 OFFICE O/P EST LOW 20 MIN: CPT | Mod: 25 | Performed by: PHYSICIAN ASSISTANT

## 2018-09-05 PROCEDURE — 11101 HC DESTRUCT PREMALIGNANT LESION, 2-14: CPT | Performed by: PHYSICIAN ASSISTANT

## 2018-09-05 PROCEDURE — 88305 TISSUE EXAM BY PATHOLOGIST: CPT | Mod: TC | Performed by: PHYSICIAN ASSISTANT

## 2018-09-05 PROCEDURE — 11100 HC BIOPSY SKIN/SUBQ/MUC MEM, SINGLE LESION: CPT | Mod: 59 | Performed by: PHYSICIAN ASSISTANT

## 2018-09-05 PROCEDURE — 17000 DESTRUCT PREMALG LESION: CPT | Mod: 51 | Performed by: PHYSICIAN ASSISTANT

## 2018-09-05 NOTE — MR AVS SNAPSHOT
After Visit Summary   9/5/2018    Aurora Figueroa    MRN: [id number]           Patient Information     Date Of Birth          1940        Visit Information        Provider Department      9/5/2018 9:40 AM Rosanna Corbett PA-C [facility]        Today's Diagnoses     Neoplasm of uncertain behavior of skin    -  1    Keratosis, actinic          Care Instructions    Proper skin care from [facility]:    -Eliminate harsh soaps as they strip the natural oils from the skin, often resulting in dry itchy skin ( i.e. Dial, Zest, [place])  -Use mild soaps such as Cetaphil or Dove Sensitive Skin in the shower. You do not need to use soap on arms, legs, and trunk every time you shower unless visibly soiled.   -Avoid hot or cold showers.  -After showering, lightly dry off and apply moisturizing within 2-3 minutes. This will help trap moisture in the skin.   -Aggressive use of a moisturizer at least 1-2 times a day to the entire body (including -Vanicream, Cetaphil, Aquaphor or Cerave) and moisturize hands after every washing.  -We recommend using moisturizers that come in a tub that needs to be scooped out, not a pump. This has more of an oil base. It will hold moisture in your skin much better than a water base moisturizer. The above recommended are non-pore clogging.           Wear a sunscreen with at least SPF 30 on your face, ears, neck and V of the chest daily. Wear sunscreen on other areas of the body if those areas are exposed to the sun throughout the day. Sunscreens can contain physical and/or chemical blockers. Physical blockers are less likely to clog pores, these include zinc oxide and titanium dioxide. Reapply every two hour and after swimming. Sunscreen examples include Neutrogena, CeraVe, Blue Lizard, Elta MD and many others.    UV radiation  UVA radiation remains constant throughout the day and throughout the year. It is a longer wavelength  than UVB and therefore penetrates deeper into the skin leading to immediate and delayed tanning, photoaging, and skin cancer. 70-80% of UVA and UVB radiation occurs between the hours of 10am-2pm.  UVB radiation  UVB radiation causes the most harmful effects and is more significant during the summer months. However, snow and ice can reflect UVB radiation leading to skin damage during the winter months as well. UVB radiation is responsible for tanning, burning, inflammation, delayed erythema (pinkness), pigmentation (brown spots), and skin cancer.   Just because you do not burn or are not developing a tan does not mean that you are not damaging your skin. A 15 minute drive to and from work for 30 years an lead to chronic sun damage of the skin. It is important to wear a broad spectrum (both UVA and UVB) sunscreen EVERY day with at least 30 SPF. Apply to face, ears, neck and v of the chest as this is where most of our sun exposure is. Reapply sunscreen every two hours if you plan on being outside.   Jeff Cantu. Clinical Dermatology: A Color Guide to Diagnosis and Therapy. Elsevier, 2016.     WOUND CARE INSTRUCTIONS  FOR CRYOSURGERY        This area treated with liquid nitrogen will form a blister. You do not need to bandage the area until after the blister forms and breaks (which may be a few days).  When the blister breaks, begin daily dressing changes as follows:    1) Clean and dry the area with tap water using clean Q-tip or sterile gauze pad.    2) Apply Polysporin ointment or Bacitracin ointment over entire wound.  Do NOT use Neosporin ointment.    3) Cover the wound with a band-aid or sterile non-stick gauze pad and micropore paper tape.      REPEAT THESE INSTRUCTIONS AT LEAST ONCE A DAY UNTIL THE WOUND HAS COMPLETELY HEALED.        It is an old wives tale that a wound heals better when it is exposed to air and allowed to dry out. The wound will heal faster with a better cosmetic result if it is kept moist  with ointment and covered with a bandage.  Do not let the wound dry out.      IMPORTANT INFORMATION ON REVERSE SIDE    Supplies Needed:     *Cotton tipped applicators (Q-tips)   *Polysporin ointment or Bacitracin ointment (NOT NEOSPORIN)   *Band-aids, or non stick gauze pads and micropore paper tape                PATIENT INFORMATION    During the healing process you will notice a number of changes. All wounds develop a small halo of redness surrounding the wound.  This means healing is occurring. Severe itching with extensive redness usually indicates sensitivity to the ointment or bandage tape used to dress the wound.  You should call our office if this develops.      Swelling and/or discoloration around your surgical site is common, particularly when performed around the eye.    All wounds normally drain.  The larger the wound the more drainage there will be.  After 7-10 days, you will notice the wound beginning to shrink and new skin will begin to grow.  The wound is healed when you can see skin has formed over the entire area.  A healed wound has a healthy, shiny look to the surface and is red to dark pink in color to normalize.  Wounds may take approximately 4-6 weeks to heal.  Larger wounds may take 6-8 weeks.  After the wound is healed you may discontinue dressing changes.    You may experience a sensation of tightness as your wound heals. This is normal and will gradually subside.    Your healed wound may be sensitive to temperature changes. This sensitivity improves with time, but if you re having a lot of discomfort, try to avoid temperature extremes.    Patients frequently experience itching after their wound appears to have healed because of the continue healing under the skin.  Plain Vaseline will help relieve the itching.                      Wound Care Instructions     FOR SUPERFICIAL WOUNDS     [facility] [phone]    [facility] [phone]          AFTER 24 HOURS  YOU SHOULD REMOVE THE BANDAGE AND BEGIN DAILY DRESSING CHANGES AS FOLLOWS:     1) Remove Dressing.     2) Clean and dry the area with tap water using a Q-tip or sterile gauze pad.     3) Apply Vaseline, Aquaphor, Polysporin ointment or Bacitracin ointment over entire wound.  Do NOT use Neosporin ointment.     4) Cover the wound with a band-aid, or a sterile non-stick gauze pad and micropore paper tape      REPEAT THESE INSTRUCTIONS AT LEAST ONCE A DAY UNTIL THE WOUND HAS COMPLETELY HEALED.    It is an old wives tale that a wound heals better when it is exposed to air and allowed to dry out. The wound will heal faster with a better cosmetic result if it is kept moist with ointment and covered with a bandage.    **Do not let the wound dry out.**      Supplies Needed:      *Cotton tipped applicators (Q-tips)    *Polysporin Ointment or Bacitracin Ointment (NOT NEOSPORIN)    *Band-aids or non-stick gauze pads and micropore paper tape.      PATIENT INFORMATION:    During the healing process you will notice a number of changes. All wounds develop a small halo of redness surrounding the wound.  This means healing is occurring. Severe itching with extensive redness usually indicates sensitivity to the ointment or bandage tape used to dress the wound.  You should call our office if this develops.      Swelling  and/or discoloration around your surgical site is common, particularly when performed around the eye.    All wounds normally drain.  The larger the wound the more drainage there will be.  After 7-10 days, you will notice the wound beginning to shrink and new skin will begin to grow.  The wound is healed when you can see skin has formed over the entire area.  A healed wound has a healthy, shiny look to the surface and is red to dark pink in color to normalize.  Wounds may take approximately 4-6 weeks to heal.  Larger wounds may take 6-8 weeks.  After the wound is healed you may discontinue dressing changes.    You may  experience a sensation of tightness as your wound heals. This is normal and will gradually subside.    Your healed wound may be sensitive to temperature changes. This sensitivity improves with time, but if you re having a lot of discomfort, try to avoid temperature extremes.    Patients frequently experience itching after their wound appears to have healed because of the continue healing under the skin.  Plain Vaseline will help relieve the itching.        POSSIBLE COMPLICATIONS    BLEEDIN. Leave the bandage in place.  2. Use tightly rolled up gauze or a cloth to apply direct pressure over the bandage for 30  minutes.  3. Reapply pressure for an additional 30 minutes if necessary  4. Use additional gauze and tape to maintain pressure once the bleeding has stopped.              Follow-ups after your visit        Your next 10 appointments already scheduled     Oct 11, 2018  7:30 AM CDT   PHYS[facility] with Cielo Ching PA-C   [facility]    [address] [place] [address] [id number] [phone]              Who to contact     If you have questions or need follow up information about today's clinic visit or your schedule please contact [facility] directly at [phone].  Normal or non-critical lab and imaging results will be communicated to you by MyChart, letter or phone within 4 business days after the clinic has received the results. If you do not hear from us within 7 days, please contact the clinic through MyChart or phone. If you have a critical or abnormal lab result, we will notify you by phone as soon as possible.  Submit refill requests through [employer] or call your pharmacy and they will forward the refill request to us. Please allow 3 business days for your refill to be completed.          Additional Information About Your Visit        Care EveryWhere ID     This is your  Care EveryWhere ID. This could be used by other organizations to access your [place] medical records  [id number]        Your Vitals Were     Pulse Last Period Pulse Oximetry             52 (LMP Unknown) 97%          Blood Pressure from Last 3 Encounters:   09/05/18 135/79   08/10/18 120/70   07/30/18 124/72    Weight from Last 3 Encounters:   08/10/18 95.6 kg (210 lb 11.2 oz)   07/30/18 94.3 kg (208 lb)   08/03/17 93.4 kg (206 lb)              We Performed the Following     BIOPSY SKIN/SUBQ/MUC MEM, SINGLE LESION     Dermatological path order and indications     DESTRUCT PREMALIGNANT LESION, 2-14     DESTRUCT PREMALIGNANT LESION, FIRST          Today's Medication Changes          These changes are accurate as of 9/5/18 10:03 AM.  If you have any questions, ask your nurse or doctor.               These medicines have changed or have updated prescriptions.        Dose/Directions    aspirin 325 MG EC tablet   This may have changed:    - how much to take  - when to take this   Used for:  Failed total knee arthroplasty, initial encounter (H)        Dose:  325 mg   Take 1 tablet (325 mg) by mouth 2 times daily (with meals)   Quantity:  60 tablet   Refills:  0                Primary Care Provider Office Phone # Fax #    Yoselin Susi Mancera [phone] [address] [facility] [address]        Equal Access to Services     ALLI CAMPOS AH: Hadii phyllis silverman hadasho Sosahra, waaxda luqadaha, qaybta kaalmada yasmin lawrence. So [facility] [phone].    ATENCIÓN: Si habla español, tiene a severino disposición servicios gratuitos de asistencia lingüística. Bogdan al [phone].    We comply with applicable federal civil rights laws and [place] laws. We do not discriminate on the basis of race, color, national origin, age, disability, sex, sexual orientation, or gender identity.            Thank you!     Thank you for choosing [facility]  for  your care. Our goal is always to provide you with excellent care. Hearing back from our patients is one way we can continue to improve our services. Please take a few minutes to complete the written survey that you may receive in the mail after your visit with us. Thank you!             Your Updated Medication List - Protect others around you: Learn how to safely use, store and throw away your medicines at www.disposemymeds.org.          This list is accurate as of 9/5/18 10:03 AM.  Always use your most recent med list.                   Brand Name Dispense Instructions for use Diagnosis    aspirin 325 MG EC tablet     60 tablet    Take 1 tablet (325 mg) by mouth 2 times daily (with meals)    Failed total knee arthroplasty, initial encounter (H)       calcium-vitamin D 500-125 MG-UNIT Tabs      Take 6 tablets by mouth daily        cranberry 200 MG Caps capsule      Take 1 tablet by mouth daily        FLINTSTONES COMPLETE PO      Take 1 tablet by mouth 2 times daily        levothyroxine 50 MCG tablet    SYNTHROID/LEVOTHROID    90 tablet    TAKE 1 TABLET (50 MCG) BY MOUTH DAILY    Hypothyroidism due to acquired atrophy of thyroid       metoprolol succinate 25 MG 24 hr tablet    TOPROL-XL    90 tablet    TAKE 1 TABLET (25 MG) BY MOUTH DAILY    Benign essential hypertension       neomycin-polymyxin-hydrocortisone 3.5-61546-8 otic suspension    CORTISPORIN    10 mL    Place 4 drops Into the left ear 4 times daily    Infective otitis externa, left       OMEPRAZOLE PO      Take 20 mg by mouth daily        simvastatin 40 MG tablet    ZOCOR    90 tablet    Take 1 tablet (40 mg) by mouth daily    Hypercholesterolemia       terbinafine 1 % cream    lamISIL     Apply topically daily as needed Applies to spot on forehead        VITAMIN B 12 PO      Take 2,500 mcg by mouth daily sublingual        vitamin D 1000 units capsule      Take 1 capsule by mouth daily

## 2018-09-05 NOTE — LETTER
9/5/2018         RE: Aurora Figueroa  [address]  [place] [address]        Dear Colleague,    Thank you for referring your patient, Aurora Figueroa, to the [facility]. Please see a copy of my visit note below.    HPI:  Aurora Figueroa is a 78 year old female patient here today for growth forehead .  Patient states this has been present for months.  Patient reports the following symptoms: not healing .  Patient reports the following previous treatments: none Patient reports the following modifying factors none.  Associated symptoms: none. Also has an itchy spot on back that has been treated with a topical in the past (unsure of name) with no resolution.  Patient has no other skin complaints today.  Remainder of the HPI, Meds, PMH, Allergies, FH, and SH was reviewed in chart.    Pertinent Hx:   Breast and colon cancer  Past Medical History:   Diagnosis Date     Hyperlipidemia      Hypothyroidism      LUMBOSACRAL NEURITIS NOS 4/12/2007     Sleep apnea     does not wear CPAP     Tachycardia        Past Surgical History:   Procedure Laterality Date     ABDOMEN SURGERY  9/2009    gastric bypass 09/2009     APPENDECTOMY  1970    1970     ARTHROPLASTY REVISION KNEE Right 2/4/2016    Procedure: ARTHROPLASTY REVISION KNEE;  Surgeon: Vincent Allen MD;  Location:  OR     COLONOSCOPY  7/14/2014    Procedure: COLONOSCOPY;  Surgeon: Jamari Jj MD;  Location:  GI     ENT SURGERY  1947    tosillectomy      EYE SURGERY  2011    macular repair 2011 left eye     EYE SURGERY  2012    cataract removal left eye.     GYN SURGERY  1978    c section     HERNIA REPAIR  1990    umbilical 1990     ORTHOPEDIC SURGERY  1993, 1995    arthroscopic     ORTHOPEDIC SURGERY  1996    Bilateral TKA     ORTHOPEDIC SURGERY  2008    Left hip replacement.        Family History   Problem Relation Age of Onset     HEART DISEASE Mother      HEART DISEASE Son      Unknown/Adopted  Father      HEART DISEASE Sister      Diabetes No family hx of      Coronary Artery Disease No family hx of      Hypertension No family hx of      Hyperlipidemia No family hx of      Cerebrovascular Disease No family hx of      Breast Cancer No family hx of      Colon Cancer No family hx of      Prostate Cancer No family hx of      Other Cancer No family hx of      Depression No family hx of      Anxiety Disorder No family hx of      Mental Illness No family hx of      Substance Abuse No family hx of      Anesthesia Reaction No family hx of      Asthma No family hx of      Osteoporosis No family hx of      Genetic Disorder No family hx of      Thyroid Disease No family hx of      Obesity No family hx of        Social History     Social History     Marital status:      Spouse name: N/A     Number of children: N/A     Years of education: N/A     Occupational History     Not on file.     Social History Main Topics     Smoking status: Never Smoker     Smokeless tobacco: Never Used     Alcohol use 0.0 oz/week     0 Standard drinks or equivalent per week      Comment: rare     Drug use: No     Sexual activity: No     Other Topics Concern     Parent/Sibling W/ Cabg, Mi Or Angioplasty Before 65f 55m? No     Social History Narrative       Outpatient Encounter Prescriptions as of 9/5/2018   Medication Sig Dispense Refill     aspirin 325 MG EC tablet Take 1 tablet (325 mg) by mouth 2 times daily (with meals) (Patient taking differently: Take 81 mg by mouth daily ) 60 tablet 0     calcium-vitamin D 500-125 MG-UNIT TABS Take 6 tablets by mouth daily        Cholecalciferol (VITAMIN D) 1000 UNITS capsule Take 1 capsule by mouth daily        Cranberry 200 MG CAPS Take 1 tablet by mouth daily        Cyanocobalamin (VITAMIN B 12 PO) Take 2,500 mcg by mouth daily sublingual       levothyroxine (SYNTHROID/LEVOTHROID) 50 MCG tablet TAKE 1 TABLET (50 MCG) BY MOUTH DAILY 90 tablet 2     metoprolol succinate (TOPROL-XL) 25 MG 24 hr  tablet TAKE 1 TABLET (25 MG) BY MOUTH DAILY 90 tablet 1     neomycin-polymyxin-hydrocortisone (CORTISPORIN) 3.5-43736-3 otic suspension Place 4 drops Into the left ear 4 times daily 10 mL 0     OMEPRAZOLE PO Take 20 mg by mouth daily        Pediatric Multivit-Minerals-C (FLINTSTONES COMPLETE PO) Take 1 tablet by mouth 2 times daily       simvastatin (ZOCOR) 40 MG tablet Take 1 tablet (40 mg) by mouth daily 90 tablet 1     terbinafine (LAMISIL) 1 % cream Apply topically daily as needed Applies to spot on forehead       No facility-administered encounter medications on file as of 9/5/2018.        Review Of Systems:  Skin: As above  Eyes: negative  Ears/Nose/Throat: negative  Respiratory: No shortness of breath, dyspnea on exertion, cough, or hemoptysis  Cardiovascular: negative  Gastrointestinal: negative  Genitourinary: negative  Musculoskeletal: negative  Neurologic: negative  Psychiatric: negative  Hematologic/Lymphatic/Immunologic: negative  Endocrine: negative      Objective:     /79  Pulse 52  LMP  (LMP Unknown)  SpO2 97%  Eyes: Conjunctivae/lids: Normal   ENT: Lips:  Normal  MSK: Normal  Cardiovascular: Peripheral edema none  Pulm: Breathing Normal  Neuro/Psych: Orientation: Normal; Mood/Affect: Normal, NAD, WDWN  Pt accompanied by: self  Following areas examined:   Scalp, face, eyelids, lips, neck, chest, abdomen, back, buttocks, and R&L upper and lower extremities.  [place] skin type:i   Findings:  1)pink pearly nodule with central depression and raised borders  left medial inferior forehead 1.0cm  2)pink scaly patch Right medial upper back 1.1cm  3) Pink scaly macule/s x 2 on right nasal side wall and right upper lateral lip  4)Red smooth well-defined macules on trunk and extremities.  5) Well circumscribed macules with symmetric color distribution on trunk and extremities.        Assessment and Plan:  1) Neoplasm of uncertain behavior on left medial inferior forehead 1.0cm  Rule out  BCC  TANGENTIAL BIOPSY:  After consent, anesthesia with LEC and prep, tangential biopsy performed.  No complications and routine wound care.  May grow back and will get a scar. Based on lesion type may need to completely remove lesion. Patient will be notified in 7-10 days of results. Wound care directions given.    2) Neoplasm of uncertain behavior on right medial upper back 1.1cm  ISK vs SSCC vs SBCC  TANGENTIAL BIOPSY:  After consent, anesthesia with LEC and prep, tangential biopsy performed.  No complications and routine wound care.  May grow back and will get a scar. Based on lesion type may need to completely remove lesion. Patient will be notified in 7-10 days of results. Wound care directions given.    3) actinic keratoses x 2  LN2 for 5 seconds x 2. Discussed AE include hypopigmentation (white spot) and recurrence. Follow up in 2-3 months to recheck lesions. There is a 0.025%-20% chance that AKs can develop into a SCC.   Discussed treating with LN2 vs PDT vs Efudex. Pt elected LN2 and PDT    4-5) cherry angiomas and multiple benign nevi  Treatment of these lesions would be purely cosmetic and not medically neccessary.  Lesion may recur and/or may not completely resolve. May need additional treatment.  Different removal options including excision, cryotherapy, cautery and /or laser.      Signs and Symptoms of non-melanoma skin cancer and ABCDEs of melanoma reviewed with patient. Patient encouraged to perform monthly self skin exams and educated on how to perform them. UV precautions reviewed with patient. Patient was asked about new or changing moles/lesions on body.     Follow up in yearly FBE. 2-3 months if ak on right upper lateral lip does not resolve.       Again, thank you for allowing me to participate in the care of your patient.        Sincerely,        Rosanna Corbett PA-C

## 2018-09-05 NOTE — PROGRESS NOTES
HPI:  Aurora Figueroa is a 78 year old female patient here today for growth forehead .  Patient states this has been present for months.  Patient reports the following symptoms: not healing .  Patient reports the following previous treatments: none Patient reports the following modifying factors none.  Associated symptoms: none. Also has an itchy spot on back that has been treated with a topical in the past (unsure of name) with no resolution.  Patient has no other skin complaints today.  Remainder of the HPI, Meds, PMH, Allergies, FH, and SH was reviewed in chart.    Pertinent Hx:   Breast and colon cancer  Past Medical History:   Diagnosis Date     Hyperlipidemia      Hypothyroidism      LUMBOSACRAL NEURITIS NOS 4/12/2007     Sleep apnea     does not wear CPAP     Tachycardia        Past Surgical History:   Procedure Laterality Date     ABDOMEN SURGERY  9/2009    gastric bypass 09/2009     APPENDECTOMY  1970    1970     ARTHROPLASTY REVISION KNEE Right 2/4/2016    Procedure: ARTHROPLASTY REVISION KNEE;  Surgeon: Vincent Allen MD;  Location:  OR     COLONOSCOPY  7/14/2014    Procedure: COLONOSCOPY;  Surgeon: Jamari Jj MD;  Location:  GI     ENT SURGERY  1947    tosillectomy      EYE SURGERY  2011    macular repair 2011 left eye     EYE SURGERY  2012    cataract removal left eye.     GYN SURGERY  1978    c section     HERNIA REPAIR  1990    umbilical 1990     ORTHOPEDIC SURGERY  1993, 1995    arthroscopic     ORTHOPEDIC SURGERY  1996    Bilateral TKA     ORTHOPEDIC SURGERY  2008    Left hip replacement.        Family History   Problem Relation Age of Onset     HEART DISEASE Mother      HEART DISEASE Son      Unknown/Adopted Father      HEART DISEASE Sister      Diabetes No family hx of      Coronary Artery Disease No family hx of      Hypertension No family hx of      Hyperlipidemia No family hx of      Cerebrovascular Disease No family hx of      Breast Cancer No family hx of      Colon  Cancer No family hx of      Prostate Cancer No family hx of      Other Cancer No family hx of      Depression No family hx of      Anxiety Disorder No family hx of      Mental Illness No family hx of      Substance Abuse No family hx of      Anesthesia Reaction No family hx of      Asthma No family hx of      Osteoporosis No family hx of      Genetic Disorder No family hx of      Thyroid Disease No family hx of      Obesity No family hx of        Social History     Social History     Marital status:      Spouse name: N/A     Number of children: N/A     Years of education: N/A     Occupational History     Not on file.     Social History Main Topics     Smoking status: Never Smoker     Smokeless tobacco: Never Used     Alcohol use 0.0 oz/week     0 Standard drinks or equivalent per week      Comment: rare     Drug use: No     Sexual activity: No     Other Topics Concern     Parent/Sibling W/ Cabg, Mi Or Angioplasty Before 65f 55m? No     Social History Narrative       Outpatient Encounter Prescriptions as of 9/5/2018   Medication Sig Dispense Refill     aspirin 325 MG EC tablet Take 1 tablet (325 mg) by mouth 2 times daily (with meals) (Patient taking differently: Take 81 mg by mouth daily ) 60 tablet 0     calcium-vitamin D 500-125 MG-UNIT TABS Take 6 tablets by mouth daily        Cholecalciferol (VITAMIN D) 1000 UNITS capsule Take 1 capsule by mouth daily        Cranberry 200 MG CAPS Take 1 tablet by mouth daily        Cyanocobalamin (VITAMIN B 12 PO) Take 2,500 mcg by mouth daily sublingual       levothyroxine (SYNTHROID/LEVOTHROID) 50 MCG tablet TAKE 1 TABLET (50 MCG) BY MOUTH DAILY 90 tablet 2     metoprolol succinate (TOPROL-XL) 25 MG 24 hr tablet TAKE 1 TABLET (25 MG) BY MOUTH DAILY 90 tablet 1     neomycin-polymyxin-hydrocortisone (CORTISPORIN) 3.5-88056-1 otic suspension Place 4 drops Into the left ear 4 times daily 10 mL 0     OMEPRAZOLE PO Take 20 mg by mouth daily        Pediatric  Multivit-Minerals-C (FLINTSTONES COMPLETE PO) Take 1 tablet by mouth 2 times daily       simvastatin (ZOCOR) 40 MG tablet Take 1 tablet (40 mg) by mouth daily 90 tablet 1     terbinafine (LAMISIL) 1 % cream Apply topically daily as needed Applies to spot on forehead       No facility-administered encounter medications on file as of 9/5/2018.        Review Of Systems:  Skin: As above  Eyes: negative  Ears/Nose/Throat: negative  Respiratory: No shortness of breath, dyspnea on exertion, cough, or hemoptysis  Cardiovascular: negative  Gastrointestinal: negative  Genitourinary: negative  Musculoskeletal: negative  Neurologic: negative  Psychiatric: negative  Hematologic/Lymphatic/Immunologic: negative  Endocrine: negative      Objective:     /79  Pulse 52  LMP  (LMP Unknown)  SpO2 97%  Eyes: Conjunctivae/lids: Normal   ENT: Lips:  Normal  MSK: Normal  Cardiovascular: Peripheral edema none  Pulm: Breathing Normal  Neuro/Psych: Orientation: Normal; Mood/Affect: Normal, NAD, WDWN  Pt accompanied by: self  Following areas examined:   Scalp, face, eyelids, lips, neck, chest, abdomen, back, buttocks, and R&L upper and lower extremities.  [place] skin type:i   Findings:  1)pink pearly nodule with central depression and raised borders  left medial inferior forehead 1.0cm  2)pink scaly patch Right medial upper back 1.1cm  3) Pink scaly macule/s x 2 on right nasal side wall and right upper lateral lip  4)Red smooth well-defined macules on trunk and extremities.  5) Well circumscribed macules with symmetric color distribution on trunk and extremities.        Assessment and Plan:  1) Neoplasm of uncertain behavior on left medial inferior forehead 1.0cm  Rule out BCC  TANGENTIAL BIOPSY:  After consent, anesthesia with LEC and prep, tangential biopsy performed.  No complications and routine wound care.  May grow back and will get a scar. Based on lesion type may need to completely remove lesion. Patient will be notified  in 7-10 days of results. Wound care directions given.    2) Neoplasm of uncertain behavior on right medial upper back 1.1cm  ISK vs SSCC vs SBCC  TANGENTIAL BIOPSY:  After consent, anesthesia with LEC and prep, tangential biopsy performed.  No complications and routine wound care.  May grow back and will get a scar. Based on lesion type may need to completely remove lesion. Patient will be notified in 7-10 days of results. Wound care directions given.    3) actinic keratoses x 2  LN2 for 5 seconds x 2. Discussed AE include hypopigmentation (white spot) and recurrence. Follow up in 2-3 months to recheck lesions. There is a 0.025%-20% chance that AKs can develop into a SCC.   Discussed treating with LN2 vs PDT vs Efudex. Pt elected LN2 and PDT    4-5) cherry angiomas and multiple benign nevi  Treatment of these lesions would be purely cosmetic and not medically neccessary.  Lesion may recur and/or may not completely resolve. May need additional treatment.  Different removal options including excision, cryotherapy, cautery and /or laser.      Signs and Symptoms of non-melanoma skin cancer and ABCDEs of melanoma reviewed with patient. Patient encouraged to perform monthly self skin exams and educated on how to perform them. UV precautions reviewed with patient. Patient was asked about new or changing moles/lesions on body.     Follow up in yearly FBE. 2-3 months if ak on right upper lateral lip does not resolve.

## 2018-09-05 NOTE — PATIENT INSTRUCTIONS
Proper skin care from [facility]:    -Eliminate harsh soaps as they strip the natural oils from the skin, often resulting in dry itchy skin ( i.e. Dial, Zest, [place])  -Use mild soaps such as Cetaphil or Dove Sensitive Skin in the shower. You do not need to use soap on arms, legs, and trunk every time you shower unless visibly soiled.   -Avoid hot or cold showers.  -After showering, lightly dry off and apply moisturizing within 2-3 minutes. This will help trap moisture in the skin.   -Aggressive use of a moisturizer at least 1-2 times a day to the entire body (including -Vanicream, Cetaphil, Aquaphor or Cerave) and moisturize hands after every washing.  -We recommend using moisturizers that come in a tub that needs to be scooped out, not a pump. This has more of an oil base. It will hold moisture in your skin much better than a water base moisturizer. The above recommended are non-pore clogging.           Wear a sunscreen with at least SPF 30 on your face, ears, neck and V of the chest daily. Wear sunscreen on other areas of the body if those areas are exposed to the sun throughout the day. Sunscreens can contain physical and/or chemical blockers. Physical blockers are less likely to clog pores, these include zinc oxide and titanium dioxide. Reapply every two hour and after swimming. Sunscreen examples include Neutrogena, CeraVe, Blue Lizard, Elta MD and many others.    UV radiation  UVA radiation remains constant throughout the day and throughout the year. It is a longer wavelength than UVB and therefore penetrates deeper into the skin leading to immediate and delayed tanning, photoaging, and skin cancer. 70-80% of UVA and UVB radiation occurs between the hours of 10am-2pm.  UVB radiation  UVB radiation causes the most harmful effects and is more significant during the summer months. However, snow and ice can reflect UVB radiation leading to skin damage during the winter months as well. UVB  radiation is responsible for tanning, burning, inflammation, delayed erythema (pinkness), pigmentation (brown spots), and skin cancer.   Just because you do not burn or are not developing a tan does not mean that you are not damaging your skin. A 15 minute drive to and from work for 30 years an lead to chronic sun damage of the skin. It is important to wear a broad spectrum (both UVA and UVB) sunscreen EVERY day with at least 30 SPF. Apply to face, ears, neck and v of the chest as this is where most of our sun exposure is. Reapply sunscreen every two hours if you plan on being outside.   Jeff Cantu. Clinical Dermatology: A Color Guide to Diagnosis and Therapy. Elsevier, 2016.     WOUND CARE INSTRUCTIONS  FOR CRYOSURGERY        This area treated with liquid nitrogen will form a blister. You do not need to bandage the area until after the blister forms and breaks (which may be a few days).  When the blister breaks, begin daily dressing changes as follows:    1) Clean and dry the area with tap water using clean Q-tip or sterile gauze pad.    2) Apply Polysporin ointment or Bacitracin ointment over entire wound.  Do NOT use Neosporin ointment.    3) Cover the wound with a band-aid or sterile non-stick gauze pad and micropore paper tape.      REPEAT THESE INSTRUCTIONS AT LEAST ONCE A DAY UNTIL THE WOUND HAS COMPLETELY HEALED.        It is an old wives tale that a wound heals better when it is exposed to air and allowed to dry out. The wound will heal faster with a better cosmetic result if it is kept moist with ointment and covered with a bandage.  Do not let the wound dry out.      IMPORTANT INFORMATION ON REVERSE SIDE    Supplies Needed:     *Cotton tipped applicators (Q-tips)   *Polysporin ointment or Bacitracin ointment (NOT NEOSPORIN)   *Band-aids, or non stick gauze pads and micropore paper tape                PATIENT INFORMATION    During the healing process you will notice a number of changes. All wounds  develop a small halo of redness surrounding the wound.  This means healing is occurring. Severe itching with extensive redness usually indicates sensitivity to the ointment or bandage tape used to dress the wound.  You should call our office if this develops.      Swelling and/or discoloration around your surgical site is common, particularly when performed around the eye.    All wounds normally drain.  The larger the wound the more drainage there will be.  After 7-10 days, you will notice the wound beginning to shrink and new skin will begin to grow.  The wound is healed when you can see skin has formed over the entire area.  A healed wound has a healthy, shiny look to the surface and is red to dark pink in color to normalize.  Wounds may take approximately 4-6 weeks to heal.  Larger wounds may take 6-8 weeks.  After the wound is healed you may discontinue dressing changes.    You may experience a sensation of tightness as your wound heals. This is normal and will gradually subside.    Your healed wound may be sensitive to temperature changes. This sensitivity improves with time, but if you re having a lot of discomfort, try to avoid temperature extremes.    Patients frequently experience itching after their wound appears to have healed because of the continue healing under the skin.  Plain Vaseline will help relieve the itching.                      Wound Care Instructions     FOR SUPERFICIAL WOUNDS     Critical access hospital [phone]    [facility] [phone]          AFTER 24 HOURS YOU SHOULD REMOVE THE BANDAGE AND BEGIN DAILY DRESSING CHANGES AS FOLLOWS:     1) Remove Dressing.     2) Clean and dry the area with tap water using a Q-tip or sterile gauze pad.     3) Apply Vaseline, Aquaphor, Polysporin ointment or Bacitracin ointment over entire wound.  Do NOT use Neosporin ointment.     4) Cover the wound with a band-aid, or a sterile non-stick gauze pad and micropore paper tape      REPEAT  THESE INSTRUCTIONS AT LEAST ONCE A DAY UNTIL THE WOUND HAS COMPLETELY HEALED.    It is an old wives tale that a wound heals better when it is exposed to air and allowed to dry out. The wound will heal faster with a better cosmetic result if it is kept moist with ointment and covered with a bandage.    **Do not let the wound dry out.**      Supplies Needed:      *Cotton tipped applicators (Q-tips)    *Polysporin Ointment or Bacitracin Ointment (NOT NEOSPORIN)    *Band-aids or non-stick gauze pads and micropore paper tape.      PATIENT INFORMATION:    During the healing process you will notice a number of changes. All wounds develop a small halo of redness surrounding the wound.  This means healing is occurring. Severe itching with extensive redness usually indicates sensitivity to the ointment or bandage tape used to dress the wound.  You should call our office if this develops.      Swelling  and/or discoloration around your surgical site is common, particularly when performed around the eye.    All wounds normally drain.  The larger the wound the more drainage there will be.  After 7-10 days, you will notice the wound beginning to shrink and new skin will begin to grow.  The wound is healed when you can see skin has formed over the entire area.  A healed wound has a healthy, shiny look to the surface and is red to dark pink in color to normalize.  Wounds may take approximately 4-6 weeks to heal.  Larger wounds may take 6-8 weeks.  After the wound is healed you may discontinue dressing changes.    You may experience a sensation of tightness as your wound heals. This is normal and will gradually subside.    Your healed wound may be sensitive to temperature changes. This sensitivity improves with time, but if you re having a lot of discomfort, try to avoid temperature extremes.    Patients frequently experience itching after their wound appears to have healed because of the continue healing under the skin.  Plain  Vaseline will help relieve the itching.        POSSIBLE COMPLICATIONS    BLEEDIN. Leave the bandage in place.  2. Use tightly rolled up gauze or a cloth to apply direct pressure over the bandage for 30  minutes.  3. Reapply pressure for an additional 30 minutes if necessary  4. Use additional gauze and tape to maintain pressure once the bleeding has stopped.

## 2018-09-10 LAB — COPATH REPORT: NORMAL

## 2018-09-12 ENCOUNTER — TELEPHONE (OUTPATIENT)
Dept: DERMATOLOGY | Facility: CLINIC | Age: 78
End: 2018-09-12

## 2018-09-12 NOTE — TELEPHONE ENCOUNTER
Called and LM for patient to call back in regards to biopsy results antoni Lamb RN-BSN  [facility]  [phone]

## 2018-09-12 NOTE — TELEPHONE ENCOUNTER
Notes Recorded by Rosanna Corbett PA-C on 9/12/2018 at 12:37 PM  A. Skin, left medial inferior forehead:   - Basal cell carcinoma, nodular type, extending to the lateral and deep   margins   ---mohs    B. Skin, right medial upper back:   -acantholytic acanthoma -benign spot. If area persists or recurs we would need to do another biopsy.

## 2018-09-12 NOTE — LETTER
[facility]  [address]  [id number] [phone]    9/19/2018       Aurora Figueroa  [facility] [address]      Dear Aurora:    You are scheduled for Mohs Surgery on: 10/25/18 @10:45am.    Please check in at [facility], [address].     You don't need to arrive more than 5-10 minutes prior to your appointment time.     Be sure to eat a good breakfast and bathe and wash your hair prior to surgery.     If you are taking any anti-coagulants that are prescribed by your Doctor (such as Coumadin/Warfarin, Plavix, Aspirin, Ibuprofen), please continue taking them.     However, if you are taking anti-coagulants over the counter without a Doctor's order for a medical condition, please discontinue them 10 days prior to surgery.     Please wear loose comfortable clothing as it could possibly be 4-6 hours until your surgery is completed depending upon how many layers of tissue need to be removed.      Thank you,    JANES Crawford MD

## 2018-09-14 NOTE — TELEPHONE ENCOUNTER
Called and LM for patient to call back  In regards to biopsy results antoni Lamb RN-BSN  [facility]  [phone]

## 2018-09-17 NOTE — TELEPHONE ENCOUNTER
Called and LM for patient to call back in regards to biopsy results antoni Lamb RN-BSN  [facility]  [phone]

## 2018-09-18 NOTE — TELEPHONE ENCOUNTER
Called and LM for patient to call back in regards to biopsy results antoni Lamb RN-BSN  [facility]  [phone]

## 2018-09-19 ENCOUNTER — ALLIED HEALTH/NURSE VISIT (OUTPATIENT)
Dept: DERMATOLOGY | Facility: CLINIC | Age: 78
End: 2018-09-19
Payer: MEDICARE

## 2018-09-19 DIAGNOSIS — Z48.01 ENCOUNTER FOR CHANGE OR REMOVAL OF SURGICAL WOUND DRESSING: Primary | ICD-10-CM

## 2018-09-19 PROCEDURE — 99207 ZZC NO CHARGE NURSE ONLY: CPT

## 2018-09-19 NOTE — TELEPHONE ENCOUNTER
Patient came into the office for biopsy results. Educated patient on biopsy results- acanothoyltic acanthoma + BCC. Educated patient on BCC, Mohs, scheduled Mohs, and letter/packet given in office. Patient voiced understanding.    DEANDRE Lamb-BSN  [facility]  [phone]

## 2018-09-19 NOTE — MR AVS SNAPSHOT
After Visit Summary   9/19/2018    Aurora Figueroa    MRN: [id number]           Patient Information     Date Of Birth          1940        Visit Information        Provider Department      9/19/2018 9:00 AM OX DERM NURSE [facility]        Today's Diagnoses     Encounter for change or removal of surgical wound dressing    -  1       Follow-ups after your visit        Your next 10 appointments already scheduled     Oct 11, 2018  7:30 AM CDT   PHYSICAL with Cielo Ching PA-C   [facility]    [address] [place] [address] [address]  [facility] [id number] [phone]            Oct 25, 2018 10:45 AM CDT   MOHS with Adrian Crawford MD   [facility] ([facility])    [address] [address] [phone]              Who to contact     If you have questions or need follow up information about today's clinic visit or your schedule please contact [facility] directly at [phone].  Normal or non-critical lab and imaging results will be communicated to you by MyChart, letter or phone within 4 business days after the clinic has received the results. If you do not hear from us within 7 days, please contact the clinic through MyChart or phone. If you have a critical or abnormal lab result, we will notify you by phone as soon as possible.  Submit refill requests through [facility] or call your pharmacy and they will forward the refill request to us. Please allow 3 business days for your refill to be completed.          Additional Information About Your Visit        Care EveryWhere ID     This is your Care EveryWhere ID. This could be used by other organizations to access your [place] medical records  [id number]        Your Vitals Were     Last Period                   (LMP Unknown)             Blood Pressure from Last 3 Encounters:   09/05/18 135/79   08/10/18 120/70   07/30/18 124/72    Weight from Last 3 Encounters:   08/10/18 95.6 kg (210 lb 11.2 oz)   07/30/18 94.3 kg (208 lb)   08/03/17 93.4 kg (206 lb)              Today, you had the following     No orders found for display         Today's Medication Changes          These changes are accurate as of 9/19/18 10:29 AM.  If you have any questions, ask your nurse or doctor.               These medicines have changed or have updated prescriptions.        Dose/Directions    aspirin 325 MG EC tablet   This may have changed:    - how much to take  - when to take this   Used for:  Failed total knee arthroplasty, initial encounter (H)        Dose:  325 mg   Take 1 tablet (325 mg) by mouth 2 times daily (with meals)   Quantity:  60 tablet   Refills:  0                Primary Care Provider Office Phone # Fax #    Yoselin Susi Mancera [phone]       [address] [facility] [address]        Equal Access to Services     [facility]: Hadii phyllis ku hadasho Sosahra, waaxda luqadaha, qaybta kaalmada adelaurayabuffy, yasmin cuellar . So [facility] [phone].    ATENCIÓN: Si habla español, tiene a severino disposición servicios gratuitos de asistencia lingüística. Llame al [phone].    We comply with applicable federal civil rights laws and [place] laws. We do not discriminate on the basis of race, color, national origin, age, disability, sex, sexual orientation, or gender identity.            Thank you!     Thank you for choosing [facility]  for your care. Our goal is always to provide you with excellent care. Hearing back from our patients is one way we can continue to improve our services. Please take a few minutes to complete the written survey that you may receive in the mail after your visit with us. Thank you!             Your Updated Medication List - Protect others around you:  Learn how to safely use, store and throw away your medicines at www.disposemymeds.org.          This list is accurate as of 9/19/18 10:29 AM.  Always use your most recent med list.                   Brand Name Dispense Instructions for use Diagnosis    aspirin 325 MG EC tablet     60 tablet    Take 1 tablet (325 mg) by mouth 2 times daily (with meals)    Failed total knee arthroplasty, initial encounter (H)       calcium-vitamin D 500-125 MG-UNIT Tabs      Take 6 tablets by mouth daily        cranberry 200 MG Caps capsule      Take 1 tablet by mouth daily        FLINTSTONES COMPLETE PO      Take 1 tablet by mouth 2 times daily        levothyroxine 50 MCG tablet    SYNTHROID/LEVOTHROID    90 tablet    TAKE 1 TABLET (50 MCG) BY MOUTH DAILY    Hypothyroidism due to acquired atrophy of thyroid       metoprolol succinate 25 MG 24 hr tablet    TOPROL-XL    90 tablet    TAKE 1 TABLET (25 MG) BY MOUTH DAILY    Benign essential hypertension       neomycin-polymyxin-hydrocortisone 3.5-56997-0 otic suspension    CORTISPORIN    10 mL    Place 4 drops Into the left ear 4 times daily    Infective otitis externa, left       OMEPRAZOLE PO      Take 20 mg by mouth daily        simvastatin 40 MG tablet    ZOCOR    90 tablet    Take 1 tablet (40 mg) by mouth daily    Hypercholesterolemia       terbinafine 1 % cream    lamISIL     Apply topically daily as needed Applies to spot on forehead        VITAMIN B 12 PO      Take 2,500 mcg by mouth daily sublingual        vitamin D 1000 units capsule      Take 1 capsule by mouth daily

## 2018-10-11 ENCOUNTER — OFFICE VISIT (OUTPATIENT)
Dept: FAMILY MEDICINE | Facility: CLINIC | Age: 78
End: 2018-10-11
Payer: MEDICARE

## 2018-10-11 VITALS
BODY MASS INDEX: 33.91 KG/M2 | DIASTOLIC BLOOD PRESSURE: 70 MMHG | OXYGEN SATURATION: 98 % | WEIGHT: 211 LBS | TEMPERATURE: 96.7 F | HEART RATE: 47 BPM | RESPIRATION RATE: 16 BRPM | HEIGHT: 66 IN | SYSTOLIC BLOOD PRESSURE: 130 MMHG

## 2018-10-11 DIAGNOSIS — E03.4 HYPOTHYROIDISM DUE TO ACQUIRED ATROPHY OF THYROID: Chronic | ICD-10-CM

## 2018-10-11 DIAGNOSIS — M25.551 HIP PAIN, RIGHT: ICD-10-CM

## 2018-10-11 DIAGNOSIS — R73.02 GLUCOSE INTOLERANCE (IMPAIRED GLUCOSE TOLERANCE): ICD-10-CM

## 2018-10-11 DIAGNOSIS — Z23 NEED FOR PROPHYLACTIC VACCINATION AND INOCULATION AGAINST INFLUENZA: ICD-10-CM

## 2018-10-11 DIAGNOSIS — E78.00 HYPERCHOLESTEROLEMIA: ICD-10-CM

## 2018-10-11 DIAGNOSIS — Z00.00 ENCOUNTER FOR MEDICARE ANNUAL WELLNESS EXAM: Primary | ICD-10-CM

## 2018-10-11 LAB
ALBUMIN SERPL-MCNC: 3.7 G/DL (ref 3.4–5)
ALP SERPL-CCNC: 72 U/L (ref 40–150)
ALT SERPL W P-5'-P-CCNC: 23 U/L (ref 0–50)
ANION GAP SERPL CALCULATED.3IONS-SCNC: 9 MMOL/L (ref 3–14)
AST SERPL W P-5'-P-CCNC: 18 U/L (ref 0–45)
BILIRUB SERPL-MCNC: 0.5 MG/DL (ref 0.2–1.3)
BUN SERPL-MCNC: 17 MG/DL (ref 7–30)
CALCIUM SERPL-MCNC: 9.6 MG/DL (ref 8.5–10.1)
CHLORIDE SERPL-SCNC: 105 MMOL/L (ref 94–109)
CHOLEST SERPL-MCNC: 159 MG/DL
CO2 SERPL-SCNC: 26 MMOL/L (ref 20–32)
CREAT SERPL-MCNC: 0.87 MG/DL (ref 0.52–1.04)
GFR SERPL CREATININE-BSD FRML MDRD: 63 ML/MIN/1.7M2
GLUCOSE SERPL-MCNC: 114 MG/DL (ref 70–99)
HDLC SERPL-MCNC: 54 MG/DL
LDLC SERPL CALC-MCNC: 88 MG/DL
NONHDLC SERPL-MCNC: 105 MG/DL
POTASSIUM SERPL-SCNC: 3.9 MMOL/L (ref 3.4–5.3)
PROT SERPL-MCNC: 7.8 G/DL (ref 6.8–8.8)
SODIUM SERPL-SCNC: 140 MMOL/L (ref 133–144)
T4 FREE SERPL-MCNC: 1.2 NG/DL (ref 0.76–1.46)
TRIGL SERPL-MCNC: 85 MG/DL
TSH SERPL DL<=0.005 MIU/L-ACNC: 4.23 MU/L (ref 0.4–4)

## 2018-10-11 PROCEDURE — 90662 IIV NO PRSV INCREASED AG IM: CPT | Performed by: PHYSICIAN ASSISTANT

## 2018-10-11 PROCEDURE — G0008 ADMIN INFLUENZA VIRUS VAC: HCPCS | Performed by: PHYSICIAN ASSISTANT

## 2018-10-11 PROCEDURE — G0439 PPPS, SUBSEQ VISIT: HCPCS | Performed by: PHYSICIAN ASSISTANT

## 2018-10-11 PROCEDURE — 84443 ASSAY THYROID STIM HORMONE: CPT | Performed by: PHYSICIAN ASSISTANT

## 2018-10-11 PROCEDURE — 36415 COLL VENOUS BLD VENIPUNCTURE: CPT | Performed by: PHYSICIAN ASSISTANT

## 2018-10-11 PROCEDURE — 80061 LIPID PANEL: CPT | Performed by: PHYSICIAN ASSISTANT

## 2018-10-11 PROCEDURE — 80053 COMPREHEN METABOLIC PANEL: CPT | Performed by: PHYSICIAN ASSISTANT

## 2018-10-11 PROCEDURE — 84439 ASSAY OF FREE THYROXINE: CPT | Performed by: PHYSICIAN ASSISTANT

## 2018-10-11 RX ORDER — SIMVASTATIN 40 MG
40 TABLET ORAL DAILY
Qty: 90 TABLET | Refills: 3 | Status: SHIPPED | OUTPATIENT
Start: 2018-10-11 | End: 2019-05-08

## 2018-10-11 ASSESSMENT — ENCOUNTER SYMPTOMS
CARDIOVASCULAR NEGATIVE: 1
CONSTITUTIONAL NEGATIVE: 1
ENDOCRINE NEGATIVE: 1
NEUROLOGICAL NEGATIVE: 1
PSYCHIATRIC NEGATIVE: 1
EYES NEGATIVE: 1
ARTHRALGIAS: 1
RESPIRATORY NEGATIVE: 1
GASTROINTESTINAL NEGATIVE: 1

## 2018-10-11 ASSESSMENT — ACTIVITIES OF DAILY LIVING (ADL)
CURRENT_FUNCTION: NO ASSISTANCE NEEDED
I_NEED_ASSISTANCE_FOR_THE_FOLLOWING_DAILY_ACTIVITIES:: NO ASSISTANCE IS NEEDED

## 2018-10-11 NOTE — PROGRESS NOTES
SUBJECTIVE:   Aurora Figueroa is a 78 year old female who presents for Preventive Visit.  Are you in the first 12 months of your Medicare coverage?  No    Physical   Annual:     Getting at least 3 servings of Calcium per day:  Yes    Bi-annual eye exam:  NO    Dental care twice a year:  Yes    Sleep apnea or symptoms of sleep apnea:  None    Diet:  Regular (no restrictions) and Other    Frequency of exercise:  None    Taking medications regularly:  Yes    Medication side effects:  None    Additional concerns today:  YES    Ability to successfully perform activities of daily living: no assistance needed    Home Safety:  Throw rugs in the hallway and lack of grab bars in the bathroom    Hearing Impairment: difficulty following a conversation in a noisy restaurant or crowded room      Ability to successfully perform activities of daily living: Yes, no assistance needed  Home safety:  none identified   Hearing impairment: Yes, Difficulty following a conversation in a noisy restaurant or crowded room.    Fall risk:  Fallen 2 or more times in the past year?: No  Any fall with injury in the past year?: No  click delete button to remove this line now  COGNITIVE SCREEN  1) Repeat 3 items (Leader, Season, Table)    2) Clock draw: NORMAL  3) 3 item recall: Recalls 3 objects  Results: 3 items recalled: COGNITIVE IMPAIRMENT LESS LIKELY    Mini-CogTM Copyright S Emily. Licensed by the author for use in [facility]; reprinted with permission (soamparo@.[email or web address]). All rights reserved.        Reviewed and updated as needed this visit by clinical staff  Tobacco  Allergies  Meds  Med Hx  Surg Hx  Fam Hx  Soc Hx        Reviewed and updated as needed this visit by Provider        Social History   Substance Use Topics     Smoking status: Never Smoker     Smokeless tobacco: Never Used     Alcohol use 0.0 oz/week     0 Standard drinks or equivalent per week      Comment: rare       Alcohol Use 10/11/2018   If you drink  alcohol do you typically have greater than 3 drinks per day OR greater than 7 drinks per week? Not Applicable   No flowsheet data found.        Today's PHQ-2 Score:   PHQ-2 ( 1999 [employer]) 10/11/2018   Q1: Little interest or pleasure in doing things 0   Q2: Feeling down, depressed or hopeless 0   PHQ-2 Score 0   Q1: Little interest or pleasure in doing things Not at all   Q2: Feeling down, depressed or hopeless Not at all   PHQ-2 Score 0       Do you feel safe in your environment - Yes    Do you have a Health Care Directive?: Yes: Advance Directive has been received and scanned.    Current providers sharing in care for this patient include:   Patient Care Team:  Yoselin Mancera MD as PCP - General (Family Practice)    The following health maintenance items are reviewed in Epic and correct as of today:  Health Maintenance   Topic Date Due     MEDICARE ANNUAL WELLNESS VISIT  07/22/2015     FALL RISK ASSESSMENT  07/30/2019     TSH W/ FREE T4 REFLEX Q1 YEAR  10/11/2019     LIPID MONITORING Q1 YEAR  10/11/2019     PHQ-2 Q1 YR  10/11/2019     ADVANCE DIRECTIVE PLANNING Q5 YRS  09/15/2020     TETANUS IMMUNIZATION (SYSTEM ASSIGNED)  07/30/2028     DEXA SCAN SCREENING (SYSTEM ASSIGNED)  Completed     PNEUMOCOCCAL  Completed     INFLUENZA VACCINE  Completed     BP Readings from Last 3 Encounters:   10/11/18 130/70   09/05/18 135/79   08/10/18 120/70    Wt Readings from Last 3 Encounters:   10/11/18 211 lb (95.7 kg)   08/10/18 210 lb 11.2 oz (95.6 kg)   07/30/18 208 lb (94.3 kg)                  Patient Active Problem List   Diagnosis     B-complex deficiency     Trigger finger     Ganglion cyst     Bariatric surgery status - 2009     Glucose intolerance (impaired glucose tolerance)     Hypothyroidism due to acquired atrophy of thyroid     ACP (advance care planning)     Failed total knee arthroplasty, initial encounter (H)     Anemia due to blood loss, acute     Benign essential hypertension     Status post total  right knee replacement on 2/4/16 by Vincent Allen MD     Acute cystitis with hematuriam w hx of recurrence      Non morbid obesity due to excess calories s/po bariatric surg 2009 w comorbid HTN, hi LDL     Hypercholesterolemia     Tachycardia since 1978     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     GYN SURGERY  1978    c section     HERNIA REPAIR  1990    umbilical 1990     ORTHOPEDIC SURGERY  1993, 1995    arthroscopic     ORTHOPEDIC SURGERY  1996    Bilateral TKA     ORTHOPEDIC SURGERY  2008    Left hip replacement.       Social History   Substance Use Topics     Smoking status: Never Smoker     Smokeless tobacco: Never Used     Alcohol use 0.0 oz/week     0 Standard drinks or equivalent per week      Comment: rare     Family History   Problem Relation Age of Onset     HEART DISEASE Mother      HEART DISEASE Son      Unknown/Adopted Father      HEART DISEASE Sister      Diabetes No family hx of      Coronary Artery Disease No family hx of      Hypertension No family hx of      Hyperlipidemia No family hx of      Cerebrovascular Disease No family hx of      Breast Cancer No family hx of      Colon Cancer No family hx of      Prostate Cancer No family hx of      Other Cancer No family hx of      Depression No family hx of      Anxiety Disorder No family hx of      Mental Illness No family hx of      Substance Abuse No family hx of       Anesthesia Reaction No family hx of      Asthma No family hx of      Osteoporosis No family hx of      Genetic Disorder No family hx of      Thyroid Disease No family hx of      Obesity No family hx of          Current Outpatient Prescriptions   Medication Sig Dispense Refill     aspirin 325 MG EC tablet Take 1 tablet (325 mg) by mouth 2 times daily (with meals) (Patient taking differently: Take 81 mg by mouth daily ) 60 tablet 0     Cholecalciferol (VITAMIN D) 1000 UNITS capsule Take 1 capsule by mouth daily        Cyanocobalamin (VITAMIN B 12 PO) Take 2,500 mcg by mouth daily sublingual       levothyroxine (SYNTHROID/LEVOTHROID) 50 MCG tablet TAKE 1 TABLET (50 MCG) BY MOUTH DAILY 90 tablet 2     metoprolol succinate (TOPROL-XL) 25 MG 24 hr tablet TAKE 1 TABLET (25 MG) BY MOUTH DAILY 90 tablet 1     OMEPRAZOLE PO Take 20 mg by mouth daily        Pediatric Multivit-Minerals-C (FLINTSTONES COMPLETE PO) Take 1 tablet by mouth 2 times daily       simvastatin (ZOCOR) 40 MG tablet Take 1 tablet (40 mg) by mouth daily 90 tablet 3     terbinafine (LAMISIL) 1 % cream Apply topically daily as needed Applies to spot on forehead       [DISCONTINUED] simvastatin (ZOCOR) 40 MG tablet Take 1 tablet (40 mg) by mouth daily 90 tablet 1     Allergies   Allergen Reactions     Penicillins Hives     Animal Dander      Pruritis,itchi eyes, throat and ears.     E.E.S. GI Disturbance     Erythromycin GI Disturbance     Fruit [Roberson]      Hives, itchy throat tomatoes     Pollen Extract Itching       Mammogram Screening: Mammo discussed, patient completed in April 2018    Review of Systems   Constitutional: Negative.    HENT: Negative.    Eyes: Negative.    Respiratory: Negative.    Cardiovascular: Negative.    Gastrointestinal: Negative.    Endocrine: Negative.    Breasts:  negative.    Genitourinary: Negative.    Musculoskeletal: Positive for arthralgias (right hip and left knee pain).   Neurological: Negative.    Psychiatric/Behavioral:  "Negative.          OBJECTIVE:   /70 (Cuff Size: Adult Large)  Pulse (!) 47  Temp 96.7  F (35.9  C) (Tympanic)  Resp 16  Ht 5' 5.5\" (1.664 m)  Wt 211 lb (95.7 kg)  LMP  (LMP Unknown)  SpO2 98%  BMI 34.58 kg/m2 Estimated body mass index is 34.58 kg/(m^2) as calculated from the following:    Height as of this encounter: 5' 5.5\" (1.664 m).    Weight as of this encounter: 211 lb (95.7 kg).  Physical Exam   Constitutional: She is oriented to person, place, and time. She appears well-developed and well-nourished. No distress.   HENT:   Right Ear: Tympanic membrane and external ear normal.   Left Ear: Tympanic membrane and external ear normal.   Nose: Nose normal.   Mouth/Throat: Oropharynx is clear and moist. No oropharyngeal exudate.   Eyes: Conjunctivae are normal. Pupils are equal, round, and reactive to light. Right eye exhibits no discharge. Left eye exhibits no discharge.   Neck: Neck supple. No tracheal deviation present. No thyromegaly present.   Cardiovascular: Normal rate, regular rhythm, S1 normal, S2 normal, normal heart sounds and normal pulses.  Exam reveals no S3, no S4 and no friction rub.    No murmur heard.  Pulmonary/Chest: Effort normal and breath sounds normal. No respiratory distress. She has no wheezes. She has no rales.   Abdominal: Soft.   Musculoskeletal: She exhibits no edema.        Right hip: She exhibits decreased range of motion (groin pain with endpoint of hip rotation). She exhibits no tenderness (no trochanteric or buttock tenderness) and no bony tenderness.   Lymphadenopathy:     She has no cervical adenopathy.   Neurological: She is alert and oriented to person, place, and time. She has normal strength and normal reflexes. She exhibits normal muscle tone.   Skin: Skin is warm and dry. No rash noted.   Psychiatric: She has a normal mood and affect. Judgment and thought content normal. Cognition and memory are normal.         Diagnostic Test Results:  No results found for " "this or any previous visit (from the past 24 hour(s)).    ASSESSMENT / PLAN:       ICD-10-CM    1. Encounter for Medicare annual wellness exam Z00.00 Lipid panel reflex to direct LDL Fasting     TSH WITH FREE T4 REFLEX     Comprehensive metabolic panel (BMP + Alb, Alk Phos, ALT, AST, Total. Bili, TP)   2. Need for prophylactic vaccination and inoculation against influenza Z23 FLU VACCINE, INCREASED ANTIGEN, PRESV FREE, AGE 65+ [51128]     ADMIN INFLUENZA (For MEDICARE Patients ONLY) []   3. Hypothyroidism due to acquired atrophy of thyroid E03.4    4. Glucose intolerance (impaired glucose tolerance) R73.02    5. Hypercholesterolemia E78.00 simvastatin (ZOCOR) 40 MG tablet   6. Hip pain, right M25.551       Follow up with Dr. Vincent Allen for right hip and left knee pain.  Right hip pain felt to be hip OA - defer XR to Dr. Allen so he can order appropriate films for surgical planning as needed.     End of Life Planning:  Patient currently has an advanced directive: Yes.  Practitioner is supportive of decision.    COUNSELING:  Reviewed preventive health counseling, as reflected in patient instructions  Special attention given to:       Regular exercise       Healthy diet/nutrition       Hearing screening       Immunizations    Vaccinated for: Influenza          BP Readings from Last 1 Encounters:   10/11/18 130/70     Estimated body mass index is 34.58 kg/(m^2) as calculated from the following:    Height as of this encounter: 5' 5.5\" (1.664 m).    Weight as of this encounter: 211 lb (95.7 kg).      Weight management plan: Discussed healthy diet and exercise guidelines and patient will follow up in 12 months in clinic to re-evaluate.     reports that she has never smoked. She has never used smokeless tobacco.      Appropriate preventive services were discussed with this patient, including applicable screening as appropriate for cardiovascular disease, diabetes, osteopenia/osteoporosis, and glaucoma.  As " appropriate for age/gender, discussed screening for colorectal cancer, prostate cancer, breast cancer, and cervical cancer. Checklist reviewing preventive services available has been given to the patient.    Reviewed patients plan of care and provided an AVS. The Intermediate Care Plan ( asthma action plan, low back pain action plan, and migraine action plan) for Aurora meets the Care Plan requirement. This Care Plan has been established and reviewed with the Patient.    Cielo Ching PA-C  [facility]  Answers for HPI/ROS submitted by the patient on 10/11/2018   PHQ-2 Score: 0

## 2018-10-11 NOTE — MR AVS SNAPSHOT
After Visit Summary   10/11/2018    Aurora Figueroa    MRN: [id number]           Patient Information     Date Of Birth          1940        Visit Information        Provider Department      10/11/2018 7:30 AM Cielo Ching PA-C [facility]        Today's Diagnoses     Encounter for Medicare annual wellness exam    -  1    Need for prophylactic vaccination and inoculation against influenza        Hypothyroidism due to acquired atrophy of thyroid        Glucose intolerance (impaired glucose tolerance)        Hypercholesterolemia          Care Instructions    I recommend getting the Shingrix vaccine.  This is a vaccine to prevent shingles and the pain associated with shingles.  It is more effective than previous shingles vaccines.  Ask for the vaccine at any [facility] or pharmacy of your choice.     https://www.cdc.gov/vaccines/vpd/shingles/public/shingrix/index.html                Follow-ups after your visit        Follow-up notes from your care team     Return in about 2 weeks (around 10/25/2018) for Dr. Vincent Allen - [phone].      Your next 10 appointments already scheduled     Oct 25, 2018 10:45 AM CDT   [facility] with Adrian Crawford MD   [facility] ([facility]    [address] [place] [id number] [phone]              Who to contact     If you have questions or need follow up information about today's clinic visit or your schedule please contact [facility] directly at [phone].  Normal or non-critical lab and imaging results will be communicated to you by MyChart, letter or phone within 4 business days after the clinic has received the results. If you do not hear from us within 7 days, please contact the clinic through MyChart or phone. If you have a critical or abnormal lab result, we will notify you by phone as soon as  "possible.  Submit refill requests through [facility] or call your pharmacy and they will forward the refill request to us. Please allow 3 business days for your refill to be completed.          Additional Information About Your Visit        Care EveryWhere ID     This is your Care EveryWhere ID. This could be used by other organizations to access your [facility] medical records  [id number]        Your Vitals Were     Pulse Temperature Respirations Height Last Period Pulse Oximetry    47 96.7  F (35.9  C) (Tympanic) 16 5' 5.5\" (1.664 m) (LMP Unknown) 98%    BMI (Body Mass Index)                   34.58 kg/m2            Blood Pressure from Last 3 Encounters:   10/11/18 130/70   09/05/18 135/79   08/10/18 120/70    Weight from Last 3 Encounters:   10/11/18 211 lb (95.7 kg)   08/10/18 210 lb 11.2 oz (95.6 kg)   07/30/18 208 lb (94.3 kg)              We Performed the Following     Comprehensive metabolic panel (BMP + Alb, Alk Phos, ALT, AST, Total. Bili, TP)     Lipid panel reflex to direct LDL Fasting     TSH WITH FREE T4 REFLEX          Today's Medication Changes          These changes are accurate as of 10/11/18  8:09 AM.  If you have any questions, ask your nurse or doctor.               These medicines have changed or have updated prescriptions.        Dose/Directions    aspirin 325 MG EC tablet   This may have changed:    - how much to take  - when to take this   Used for:  Failed total knee arthroplasty, initial encounter (H)        Dose:  325 mg   Take 1 tablet (325 mg) by mouth 2 times daily (with meals)   Quantity:  60 tablet   Refills:  0            Where to get your medicines      These medications were sent to [facility]/pharmacy #[id number] - [place], [place] - [address] [address]     Phone:  [phone]     simvastatin 40 MG tablet                Primary Care Provider Office Phone # Fax #    Yoselin Mancera [phone] [address]  [facility] [address]        Equal Access to Services     ALLI CAMPOS : Hadii aad ku hadmoytristan Sodeanali, waaxda luqadaha, qaybta kaalmayasmin alexander. So [facility] [phone].    ATENCIÓN: Si habla español, tiene a severino disposición servicios gratuitos de asistencia lingüística. Alfredoame al [phone].    We comply with applicable federal civil rights laws and [place] laws. We do not discriminate on the basis of race, color, national origin, age, disability, sex, sexual orientation, or gender identity.            Thank you!     Thank you for choosing [facility]  for your care. Our goal is always to provide you with excellent care. Hearing back from our patients is one way we can continue to improve our services. Please take a few minutes to complete the written survey that you may receive in the mail after your visit with us. Thank you!             Your Updated Medication List - Protect others around you: Learn how to safely use, store and throw away your medicines at www.disposemymeds.org.          This list is accurate as of 10/11/18  8:09 AM.  Always use your most recent med list.                   Brand Name Dispense Instructions for use Diagnosis    aspirin 325 MG EC tablet     60 tablet    Take 1 tablet (325 mg) by mouth 2 times daily (with meals)    Failed total knee arthroplasty, initial encounter (H)       FLINTSTONES COMPLETE PO      Take 1 tablet by mouth 2 times daily        levothyroxine 50 MCG tablet    SYNTHROID/LEVOTHROID    90 tablet    TAKE 1 TABLET (50 MCG) BY MOUTH DAILY    Hypothyroidism due to acquired atrophy of thyroid       metoprolol succinate 25 MG 24 hr tablet    TOPROL-XL    90 tablet    TAKE 1 TABLET (25 MG) BY MOUTH DAILY    Benign essential hypertension       OMEPRAZOLE PO      Take 20 mg by mouth daily        simvastatin 40 MG tablet    ZOCOR    90 tablet    Take 1 tablet (40 mg) by mouth daily    Hypercholesterolemia        terbinafine 1 % cream    lamISIL     Apply topically daily as needed Applies to spot on forehead        VITAMIN B 12 PO      Take 2,500 mcg by mouth daily sublingual        vitamin D 1000 units capsule      Take 1 capsule by mouth daily

## 2018-10-11 NOTE — PATIENT INSTRUCTIONS
I recommend getting the Shingrix vaccine.  This is a vaccine to prevent shingles and the pain associated with shingles.  It is more effective than previous shingles vaccines.  Ask for the vaccine at any [facility] or pharmacy of your choice.     https://www.cdc.gov/vaccines/vpd/shingles/public/shingrix/index.html

## 2018-10-11 NOTE — PROGRESS NOTES
Answers for HPI/ROS submitted by the patient on 10/11/2018   Annual Exam:  Getting at least 3 servings of Calcium per day:: Yes  Bi-annual eye exam:: NO  Dental care twice a year:: Yes  Sleep apnea or symptoms of sleep apnea:: None  Diet:: Regular (no restrictions), Other  Frequency of exercise:: None  Taking medications regularly:: Yes  Medication side effects:: None  Additional concerns today:: YES  Activities of Daily Living: no assistance needed  Home safety: throw rugs in the hallway, lack of grab bars in the bathroom  Hearing Impairment:: difficulty following a conversation in a noisy restaurant or crowded room  PHQ-2 Score: 0      Injectable Influenza Immunization Documentation    1.  Is the person to be vaccinated sick today?   No    2. Does the person to be vaccinated have an allergy to a component   of the vaccine?   No  Egg Allergy Algorithm Link    3. Has the person to be vaccinated ever had a serious reaction   to influenza vaccine in the past?   No    4. Has the person to be vaccinated ever had Guillain-Barré syndrome?   No    Form completed by Caitlin Tejeda CMA

## 2018-10-11 NOTE — LETTER
October 12, 2018      Aurora Figueroa  [address] [address]        Dear ,    We are writing to inform you of your test results.    Thyroid tests (TSH and T4 Free) show good replacement.  TSH is just minimally elevated   Cholesterol panel shows LDL well controlled   Metabolic panel shows glucose just minimally elevated    Resulted Orders   Lipid panel reflex to direct LDL Fasting   Result Value Ref Range    Cholesterol 159 <200 mg/dL    Triglycerides 85 <150 mg/dL      Comment:      Fasting specimen    HDL Cholesterol 54 >49 mg/dL    LDL Cholesterol Calculated 88 <100 mg/dL      Comment:      Desirable:       <100 mg/dl    Non HDL Cholesterol 105 <130 mg/dL   TSH WITH FREE T4 REFLEX   Result Value Ref Range    TSH 4.23 (H) 0.40 - 4.00 mU/L   Comprehensive metabolic panel (BMP + Alb, Alk Phos, ALT, AST, Total. Bili, TP)   Result Value Ref Range    Sodium 140 133 - 144 mmol/L    Potassium 3.9 3.4 - 5.3 mmol/L    Chloride 105 94 - 109 mmol/L    Carbon Dioxide 26 20 - 32 mmol/L    Anion Gap 9 3 - 14 mmol/L    Glucose 114 (H) 70 - 99 mg/dL      Comment:      Fasting specimen    Urea Nitrogen 17 7 - 30 mg/dL    Creatinine 0.87 0.52 - 1.04 mg/dL    GFR Estimate 63 >60 mL/min/1.7m2      Comment:      Non  GFR Calc    GFR Estimate If Black 76 >60 mL/min/1.7m2      Comment:       GFR Calc    Calcium 9.6 8.5 - 10.1 mg/dL    Bilirubin Total 0.5 0.2 - 1.3 mg/dL    Albumin 3.7 3.4 - 5.0 g/dL    Protein Total 7.8 6.8 - 8.8 g/dL    Alkaline Phosphatase 72 40 - 150 U/L    ALT 23 0 - 50 U/L    AST 18 0 - 45 U/L   T4 free   Result Value Ref Range    T4 Free 1.20 0.76 - 1.46 ng/dL       If you have any questions or concerns, please call the clinic at the number listed above.       Sincerely,        Cielo Ching PA-C

## 2018-10-25 ENCOUNTER — OFFICE VISIT (OUTPATIENT)
Dept: DERMATOLOGY | Facility: CLINIC | Age: 78
End: 2018-10-25
Payer: MEDICARE

## 2018-10-25 VITALS — OXYGEN SATURATION: 96 % | HEART RATE: 55 BPM

## 2018-10-25 DIAGNOSIS — C44.319 BASAL CELL CARCINOMA, FOREHEAD: Primary | ICD-10-CM

## 2018-10-25 PROCEDURE — 17312 MOHS ADDL STAGE: CPT | Performed by: DERMATOLOGY

## 2018-10-25 PROCEDURE — 14041 TIS TRNFR F/C/C/M/N/A/G/H/F: CPT | Performed by: DERMATOLOGY

## 2018-10-25 PROCEDURE — 17311 MOHS 1 STAGE H/N/HF/G: CPT | Mod: 51 | Performed by: DERMATOLOGY

## 2018-10-25 NOTE — MR AVS SNAPSHOT
After Visit Summary   10/25/2018    Aurora Figueroa    MRN: [id number]           Patient Information     Date Of Birth          1940        Visit Information        Provider Department      10/25/2018 10:45 AM Adrian Crawford MD [facility]        Today's Diagnoses     Basal cell carcinoma, forehead    -  1      Care Instructions      Sutured Wound Care     [facility]: [phone]    [facility]: [phone]          ? No strenuous activity for 48 hours. Resume moderate activity in 48 hours. No heavy exercising until you are seen for follow up in one week.     ? Take Tylenol as needed for discomfort.                         ? Do not drink alcoholic beverages for 48 hours.     ? Keep the pressure bandage in place for 24 hours. If the bandage becomes blood tinged or loose, reinforce it with gauze and tape.        (Refer to the reverse side of this page for management of bleeding).    ? Remove pressure bandage in 24 hours     ? Leave the flat bandage in place until your follow up appointment.    ? Keep the bandage dry. Wash around it carefully.    ? If the tape becomes soiled or starts to come off, reinforce it with additional paper tape.    ? Do not smoke for 3 weeks; smoking is detrimental to wound healing.    ? It is normal to have swelling and bruising around the surgical site. The bruising will fade in approximately 10-14 days. Elevate the area to reduce swelling.    ? Numbness, itchiness and sensitivity to temperature changes can occur after surgery and may take up to 18 months to normalize.      POSSIBLE COMPLICATIONS    BLEEDIN. Leave the bandage in place.  2. Use tightly rolled up gauze or a cloth to apply direct pressure over the bandage for 20   minutes.  3. Reapply pressure for an additional 20 minutes if necessary  4. Call the office or go to the nearest emergency room if pressure fails to stop the bleeding.  5. Use  additional gauze and tape to maintain pressure once the bleeding has stopped.        PAIN:    1. Post operative pain should slowly get better, never worse.  2. A severe increase in pain may indicate a problem. Call the office if this occurs.    In case of emergency phone:Dr Crawford [phone]              Follow-ups after your visit        Who to contact     If you have questions or need follow up information about today's clinic visit or your schedule please contact [facility] directly at [phone].  Normal or non-critical lab and imaging results will be communicated to you by MyChart, letter or phone within 4 business days after the clinic has received the results. If you do not hear from us within 7 days, please contact the clinic through MyChart or phone. If you have a critical or abnormal lab result, we will notify you by phone as soon as possible.  Submit refill requests through [employer] or call your pharmacy and they will forward the refill request to us. Please allow 3 business days for your refill to be completed.          Additional Information About Your Visit        Care EveryWhere ID     This is your Care EveryWhere ID. This could be used by other organizations to access your [place] medical records  [id number]        Your Vitals Were     Pulse Last Period Pulse Oximetry Breastfeeding?          55 (LMP Unknown) 96% No         Blood Pressure from Last 3 Encounters:   10/11/18 130/70   09/05/18 135/79   08/10/18 120/70    Weight from Last 3 Encounters:   10/11/18 95.7 kg (211 lb)   08/10/18 95.6 kg (210 lb 11.2 oz)   07/30/18 94.3 kg (208 lb)              We Performed the Following     65010 - ADJ TISSUE XFER HEAD/FAC/AXIL/GEN/HND/FT 10.1-30 CM     MOHS HEAD/NCK/HND/FT/GEN 1ST STAGE UP T0 5 BLOCKS     MOHS HEAD/NCK/HND/FT/GEN EA ADDTL STAGE UP T0 5 BLOCKS          Today's Medication Changes          These changes are accurate as of 10/25/18  1:45 PM.  If you have any  questions, ask your nurse or doctor.               These medicines have changed or have updated prescriptions.        Dose/Directions    aspirin 325 MG EC tablet   This may have changed:    - how much to take  - when to take this   Used for:  Failed total knee arthroplasty, initial encounter (H)        Dose:  325 mg   Take 1 tablet (325 mg) by mouth 2 times daily (with meals)   Quantity:  60 tablet   Refills:  0                Primary Care Provider Office Phone # Fax #    Yoselin Mancera [phone] [address] [facility] [address]        Equal Access to Services     [facility]: Hadii aad ku hadasho Soomaali, waaxda luqadaha, qaybta kaalmada adeegyada, yasmin cuellar . So [facility] [phone].    ATENCIÓN: Si habla español, tiene a severino disposición servicios gratuitos de asistencia lingüística. [facility] [phone].    We comply with applicable federal civil rights laws and [place] laws. We do not discriminate on the basis of race, color, national origin, age, disability, sex, sexual orientation, or gender identity.            Thank you!     Thank you for choosing [facility]  for your care. Our goal is always to provide you with excellent care. Hearing back from our patients is one way we can continue to improve our services. Please take a few minutes to complete the written survey that you may receive in the mail after your visit with us. Thank you!             Your Updated Medication List - Protect others around you: Learn how to safely use, store and throw away your medicines at www.disposemymeds.org.          This list is accurate as of 10/25/18  1:45 PM.  Always use your most recent med list.                   Brand Name Dispense Instructions for use Diagnosis    aspirin 325 MG EC tablet     60 tablet    Take 1 tablet (325 mg) by mouth 2 times daily (with meals)    Failed total knee arthroplasty, initial encounter (H)        FLINTSTONES COMPLETE PO      Take 1 tablet by mouth 2 times daily        levothyroxine 50 MCG tablet    SYNTHROID/LEVOTHROID    90 tablet    TAKE 1 TABLET (50 MCG) BY MOUTH DAILY    Hypothyroidism due to acquired atrophy of thyroid       metoprolol succinate 25 MG 24 hr tablet    TOPROL-XL    90 tablet    TAKE 1 TABLET (25 MG) BY MOUTH DAILY    Benign essential hypertension       OMEPRAZOLE PO      Take 20 mg by mouth daily        simvastatin 40 MG tablet    ZOCOR    90 tablet    Take 1 tablet (40 mg) by mouth daily    Hypercholesterolemia       terbinafine 1 % cream    lamISIL     Apply topically daily as needed Applies to spot on forehead        VITAMIN B 12 PO      Take 2,500 mcg by mouth daily sublingual        vitamin D 1000 units capsule      Take 1 capsule by mouth daily

## 2018-10-25 NOTE — PATIENT INSTRUCTIONS
Sutured Wound Care     [facility]: [phone]    [facility]: [phone]          ? No strenuous activity for 48 hours. Resume moderate activity in 48 hours. No heavy exercising until you are seen for follow up in one week.     ? Take Tylenol as needed for discomfort.                         ? Do not drink alcoholic beverages for 48 hours.     ? Keep the pressure bandage in place for 24 hours. If the bandage becomes blood tinged or loose, reinforce it with gauze and tape.        (Refer to the reverse side of this page for management of bleeding).    ? Remove pressure bandage in 24 hours     ? Leave the flat bandage in place until your follow up appointment.    ? Keep the bandage dry. Wash around it carefully.    ? If the tape becomes soiled or starts to come off, reinforce it with additional paper tape.    ? Do not smoke for 3 weeks; smoking is detrimental to wound healing.    ? It is normal to have swelling and bruising around the surgical site. The bruising will fade in approximately 10-14 days. Elevate the area to reduce swelling.    ? Numbness, itchiness and sensitivity to temperature changes can occur after surgery and may take up to 18 months to normalize.      POSSIBLE COMPLICATIONS    BLEEDIN. Leave the bandage in place.  2. Use tightly rolled up gauze or a cloth to apply direct pressure over the bandage for 20   minutes.  3. Reapply pressure for an additional 20 minutes if necessary  4. Call the office or go to the nearest emergency room if pressure fails to stop the bleeding.  5. Use additional gauze and tape to maintain pressure once the bleeding has stopped.        PAIN:    1. Post operative pain should slowly get better, never worse.  2. A severe increase in pain may indicate a problem. Call the office if this occurs.    In case of emergency phone:Dr Crawford [phone]

## 2018-10-25 NOTE — PROGRESS NOTES
Aurora Figueroa is a 78 year old year old female patient here today for evaluation and managment of basal cell carcinoma on left forehead.   .  Patient states this has been present for years.  Patient reports the following symptoms:  Not healing .  Patient reports the following previous treatments none.  Patient reports the following modifying factors none.  Associated symptoms: none.  Patient has no other skin complaints today.  Remainder of the HPI, Meds, PMH, Allergies, FH, and SH was reviewed in chart.      Past Medical History:   Diagnosis Date     Basal cell carcinoma      Hyperlipidemia      Hypothyroidism      LUMBOSACRAL NEURITIS NOS 4/12/2007     Sleep apnea     does not wear CPAP     Tachycardia        Past Surgical History:   Procedure Laterality Date     ABDOMEN SURGERY  9/2009    gastric bypass 09/2009     APPENDECTOMY  1970    1970     ARTHROPLASTY REVISION KNEE Right 2/4/2016    Procedure: ARTHROPLASTY REVISION KNEE;  Surgeon: Vincent Allen MD;  Location:  OR     COLONOSCOPY  7/14/2014    Procedure: COLONOSCOPY;  Surgeon: Jamari Jj MD;  Location:  GI     ENT SURGERY  1947    tosillectomy      EYE SURGERY  2011    macular repair 2011 left eye     EYE SURGERY  2012    cataract removal left eye.     GYN SURGERY  1978    c section     HERNIA REPAIR  1990    umbilical 1990     ORTHOPEDIC SURGERY  1993, 1995    arthroscopic     ORTHOPEDIC SURGERY  1996    Bilateral TKA     ORTHOPEDIC SURGERY  2008    Left hip replacement.        Family History   Problem Relation Age of Onset     HEART DISEASE Mother      Skin Cancer Mother      HEART DISEASE Son      Unknown/Adopted Father      HEART DISEASE Sister      Skin Cancer Brother      Diabetes No family hx of      Coronary Artery Disease No family hx of      Hypertension No family hx of      Hyperlipidemia No family hx of      Cerebrovascular Disease No family hx of      Breast Cancer No family hx of      Colon Cancer No family hx of       Prostate Cancer No family hx of      Other Cancer No family hx of      Depression No family hx of      Anxiety Disorder No family hx of      Mental Illness No family hx of      Substance Abuse No family hx of      Anesthesia Reaction No family hx of      Asthma No family hx of      Osteoporosis No family hx of      Genetic Disorder No family hx of      Thyroid Disease No family hx of      Obesity No family hx of        Social History     Social History     Marital status:      Spouse name: N/A     Number of children: N/A     Years of education: N/A     Occupational History     Not on file.     Social History Main Topics     Smoking status: Never Smoker     Smokeless tobacco: Never Used     Alcohol use 0.0 oz/week     0 Standard drinks or equivalent per week      Comment: rare     Drug use: No     Sexual activity: No     Other Topics Concern     Parent/Sibling W/ Cabg, Mi Or Angioplasty Before 65f 55m? No     Social History Narrative       Outpatient Encounter Prescriptions as of 10/25/2018   Medication Sig Dispense Refill     aspirin 325 MG EC tablet Take 1 tablet (325 mg) by mouth 2 times daily (with meals) (Patient taking differently: Take 81 mg by mouth daily ) 60 tablet 0     Cholecalciferol (VITAMIN D) 1000 UNITS capsule Take 1 capsule by mouth daily        Cyanocobalamin (VITAMIN B 12 PO) Take 2,500 mcg by mouth daily sublingual       levothyroxine (SYNTHROID/LEVOTHROID) 50 MCG tablet TAKE 1 TABLET (50 MCG) BY MOUTH DAILY 90 tablet 2     metoprolol succinate (TOPROL-XL) 25 MG 24 hr tablet TAKE 1 TABLET (25 MG) BY MOUTH DAILY 90 tablet 1     OMEPRAZOLE PO Take 20 mg by mouth daily        Pediatric Multivit-Minerals-C (FLINTSTONES COMPLETE PO) Take 1 tablet by mouth 2 times daily       simvastatin (ZOCOR) 40 MG tablet Take 1 tablet (40 mg) by mouth daily 90 tablet 3     terbinafine (LAMISIL) 1 % cream Apply topically daily as needed Applies to spot on forehead       No facility-administered encounter  medications on file as of 10/25/2018.              Review Of Systems  Skin: As above  Eyes: negative  Ears/Nose/Throat: negative  Respiratory: No shortness of breath, dyspnea on exertion, cough, or hemoptysis  Cardiovascular: negative  Gastrointestinal: negative  Genitourinary: negative  Musculoskeletal: negative  Neurologic: negative  Psychiatric: negative  Hematologic/Lymphatic/Immunologic: negative  Endocrine: negative      O:   NAD, WDWN, Alert & Oriented, Mood & Affect wnl, Vitals stable   Here today alone   Pulse 55  LMP  (LMP Unknown)  SpO2 96%  Breastfeeding? No   General appearance normal   Vitals stable   Alert, oriented and in no acute distress     L lower mid forehead 1.6cm red pearly ulcerated plaque       Eyes: Conjunctivae/lids:Normal     ENT: Lips, buccal mucosa, tongue: normal    MSK:Normal    Cardiovascular: peripheral edema none    Pulm: Breathing Normal    Neuro/Psych: Orientation:Normal; Mood/Affect:Normal      A/P:  1. R forehead basal cell carcinoma   MOHS:   Location    After PGACAC discussed with patient, decision for Mohs surgery was made. Indication for Mohs was Location. Patient confirmed the site with Dr. Crawford.  After anesthesia with LEC, the tumor was excised using standard Mohs technique in 3 stages(s).  CLEAR MARGINS OBTAINED and Final defect size was 2.4* cm.   REPAIR WITH BUROW'S FLAP: Because of the Because of the size and full thickness nature of the defect and Because of the proximity to the brow, an advancement flap was planned. After LEC anesthesia and prep, the Burow's triangles were excised. One Burow's triangle was displaced laterally  to hide incisions within skin relaxation lines. The advancement flap was raised by dissection in the deep subcutaneous plane. The remaining wound edges were undermined and hemostasis was obtained. The flap was advanced into the defect with care to avoid distortion and was sutured into place in a layered fashion using Vicryl and Fast  Absorbing sutures. Postoperative size was 5.2 x 3.3 cm.  EBL minimal; complications none; wound care routine.  The patient was discharged in good condition and will return in one week for wound evaluation.        BENIGN LESIONS DISCUSSED WITH PATIENT:  I discussed the specifics of tumor, prognosis, and genetics of benign lesions.  I explained that treatment of these lesions would be purely cosmetic and not medically neccessary.  I discussed with patient different removal options including excision, cautery and /or laser.      Nature and genetics of benign skin lesions dicussed with patient.  Signs and Symptoms of skin cancer discussed with patient.  Patient encouraged to perform monthly skin exams.  UV precautions reviewed with patient.  Patient to follow up with Primary Care provider regarding elevated blood pressure.  Skin care regimen reviewed with patient: Eliminate harsh soaps, i.e. Dial, zest, irsih spring; Mild soaps such as Cetaphil or Dove sensitive skin, avoid hot or cold showers, aggressive use of emollients including vanicream, cetaphil or cerave discussed with patient.    Risks of non-melanoma skin cancer discussed with patient   Return to clinic 6 months  Patient to follow up with Primary Care provider regarding elevated blood pressure.

## 2018-10-25 NOTE — LETTER
10/25/2018         RE: Aurora Figueroa  [address]  [place] [address]        Dear Colleague,    Thank you for referring your patient, Aurora Figueroa, to the [facility]. Please see a copy of my visit note below.    Aurora Figueroa is a 78 year old year old female patient here today for evaluation and managment of basal cell carcinoma on left forehead.   .  Patient states this has been present for years.  Patient reports the following symptoms:  Not healing .  Patient reports the following previous treatments none.  Patient reports the following modifying factors none.  Associated symptoms: none.  Patient has no other skin complaints today.  Remainder of the HPI, Meds, PMH, Allergies, FH, and SH was reviewed in chart.      Past Medical History:   Diagnosis Date     Basal cell carcinoma      Hyperlipidemia      Hypothyroidism      LUMBOSACRAL NEURITIS NOS 4/12/2007     Sleep apnea     does not wear CPAP     Tachycardia        Past Surgical History:   Procedure Laterality Date     ABDOMEN SURGERY  9/2009    gastric bypass 09/2009     APPENDECTOMY  1970    1970     ARTHROPLASTY REVISION KNEE Right 2/4/2016    Procedure: ARTHROPLASTY REVISION KNEE;  Surgeon: Vincent Allen MD;  Location:  OR     COLONOSCOPY  7/14/2014    Procedure: COLONOSCOPY;  Surgeon: Jamari Jj MD;  Location:  GI     ENT SURGERY  1947    tosillectomy      EYE SURGERY  2011    macular repair 2011 left eye     EYE SURGERY  2012    cataract removal left eye.     GYN SURGERY  1978    c section     HERNIA REPAIR  1990    umbilical 1990     ORTHOPEDIC SURGERY  1993, 1995    arthroscopic     ORTHOPEDIC SURGERY  1996    Bilateral TKA     ORTHOPEDIC SURGERY  2008    Left hip replacement.        Family History   Problem Relation Age of Onset     HEART DISEASE Mother      Skin Cancer Mother      HEART DISEASE Son      Unknown/Adopted Father      HEART DISEASE Sister      Skin  Cancer Brother      Diabetes No family hx of      Coronary Artery Disease No family hx of      Hypertension No family hx of      Hyperlipidemia No family hx of      Cerebrovascular Disease No family hx of      Breast Cancer No family hx of      Colon Cancer No family hx of      Prostate Cancer No family hx of      Other Cancer No family hx of      Depression No family hx of      Anxiety Disorder No family hx of      Mental Illness No family hx of      Substance Abuse No family hx of      Anesthesia Reaction No family hx of      Asthma No family hx of      Osteoporosis No family hx of      Genetic Disorder No family hx of      Thyroid Disease No family hx of      Obesity No family hx of        Social History     Social History     Marital status:      Spouse name: N/A     Number of children: N/A     Years of education: N/A     Occupational History     Not on file.     Social History Main Topics     Smoking status: Never Smoker     Smokeless tobacco: Never Used     Alcohol use 0.0 oz/week     0 Standard drinks or equivalent per week      Comment: rare     Drug use: No     Sexual activity: No     Other Topics Concern     Parent/Sibling W/ Cabg, Mi Or Angioplasty Before 65f 55m? No     Social History Narrative       Outpatient Encounter Prescriptions as of 10/25/2018   Medication Sig Dispense Refill     aspirin 325 MG EC tablet Take 1 tablet (325 mg) by mouth 2 times daily (with meals) (Patient taking differently: Take 81 mg by mouth daily ) 60 tablet 0     Cholecalciferol (VITAMIN D) 1000 UNITS capsule Take 1 capsule by mouth daily        Cyanocobalamin (VITAMIN B 12 PO) Take 2,500 mcg by mouth daily sublingual       levothyroxine (SYNTHROID/LEVOTHROID) 50 MCG tablet TAKE 1 TABLET (50 MCG) BY MOUTH DAILY 90 tablet 2     metoprolol succinate (TOPROL-XL) 25 MG 24 hr tablet TAKE 1 TABLET (25 MG) BY MOUTH DAILY 90 tablet 1     OMEPRAZOLE PO Take 20 mg by mouth daily        Pediatric Multivit-Minerals-C ([facility]  COMPLETE PO) Take 1 tablet by mouth 2 times daily       simvastatin (ZOCOR) 40 MG tablet Take 1 tablet (40 mg) by mouth daily 90 tablet 3     terbinafine (LAMISIL) 1 % cream Apply topically daily as needed Applies to spot on forehead       No facility-administered encounter medications on file as of 10/25/2018.              Review Of Systems  Skin: As above  Eyes: negative  Ears/Nose/Throat: negative  Respiratory: No shortness of breath, dyspnea on exertion, cough, or hemoptysis  Cardiovascular: negative  Gastrointestinal: negative  Genitourinary: negative  Musculoskeletal: negative  Neurologic: negative  Psychiatric: negative  Hematologic/Lymphatic/Immunologic: negative  Endocrine: negative      O:   NAD, WDWN, Alert & Oriented, Mood & Affect wnl, Vitals stable   Here today alone   Pulse 55  LMP  (LMP Unknown)  SpO2 96%  Breastfeeding? No   General appearance normal   Vitals stable   Alert, oriented and in no acute distress     L lower mid forehead 1.6cm red pearly ulcerated plaque       Eyes: Conjunctivae/lids:Normal     ENT: Lips, buccal mucosa, tongue: normal    MSK:Normal    Cardiovascular: peripheral edema none    Pulm: Breathing Normal    Neuro/Psych: Orientation:Normal; Mood/Affect:Normal      A/P:  1. R forehead basal cell carcinoma   MOHS:   Location    After PGACAC discussed with patient, decision for Mohs surgery was made. Indication for Mohs was Location. Patient confirmed the site with Dr. Crawford.  After anesthesia with LEC, the tumor was excised using standard Mohs technique in 3 stages(s).  CLEAR MARGINS OBTAINED and Final defect size was 2.4* cm.   REPAIR WITH BUROW'S FLAP: Because of the Because of the size and full thickness nature of the defect and Because of the proximity to the brow, an advancement flap was planned. After LEC anesthesia and prep, the Burow's triangles were excised. One Burow's triangle was displaced laterally  to hide incisions within skin relaxation lines. The advancement  flap was raised by dissection in the deep subcutaneous plane. The remaining wound edges were undermined and hemostasis was obtained. The flap was advanced into the defect with care to avoid distortion and was sutured into place in a layered fashion using Vicryl and Fast Absorbing sutures. Postoperative size was 5.2 x 3.3 cm.  EBL minimal; complications none; wound care routine.  The patient was discharged in good condition and will return in one week for wound evaluation.        BENIGN LESIONS DISCUSSED WITH PATIENT:  I discussed the specifics of tumor, prognosis, and genetics of benign lesions.  I explained that treatment of these lesions would be purely cosmetic and not medically neccessary.  I discussed with patient different removal options including excision, cautery and /or laser.      Nature and genetics of benign skin lesions dicussed with patient.  Signs and Symptoms of skin cancer discussed with patient.  Patient encouraged to perform monthly skin exams.  UV precautions reviewed with patient.  Patient to follow up with Primary Care provider regarding elevated blood pressure.  Skin care regimen reviewed with patient: Eliminate harsh soaps, i.e. Dial, zest, irsih spring; Mild soaps such as Cetaphil or Dove sensitive skin, avoid hot or cold showers, aggressive use of emollients including vanicream, cetaphil or cerave discussed with patient.    Risks of non-melanoma skin cancer discussed with patient   Return to clinic 6 months  Patient to follow up with Primary Care provider regarding elevated blood pressure.        Again, thank you for allowing me to participate in the care of your patient.        Sincerely,        Adrian Crawford MD

## 2018-11-01 ENCOUNTER — ALLIED HEALTH/NURSE VISIT (OUTPATIENT)
Dept: DERMATOLOGY | Facility: CLINIC | Age: 78
End: 2018-11-01
Payer: MEDICARE

## 2018-11-01 DIAGNOSIS — Z48.01 ENCOUNTER FOR CHANGE OR REMOVAL OF SURGICAL WOUND DRESSING: Primary | ICD-10-CM

## 2018-11-01 PROCEDURE — 99207 ZZC NO CHARGE NURSE ONLY: CPT

## 2018-11-01 NOTE — PATIENT INSTRUCTIONS

## 2018-11-01 NOTE — NURSING NOTE
Pt returned to clinic for post surgery 1 week follow up bandage change. Pt has no complaints, denies pain. Bandage removed forehead, area cleansed with normal saline. Site is healing and wound edges approximating well. Reapplied new steri strips and paper tape.    Advised to watch for signs/sx of infection; spreading redness, drainage, odor, fever. Call or report promptly to clinic. Pt given written instructions and informed to rtc as needed. Patient verbalized understanding.

## 2018-11-01 NOTE — MR AVS SNAPSHOT
After Visit Summary   11/1/2018    Aurora Figueroa    MRN: [id number]           Patient Information     Date Of Birth          1940        Visit Information        Provider Department      11/1/2018 1:20 PM [facility]        Care Instructions    WOUND CARE INSTRUCTIONS  for  ONE WEEK AFTER SURGERY          1) Leave flat bandage on your skin for one week after today s bandage change.  2) In one week when you remove the bandage, you may resume your regular skin care routine, including washing with mild soap and water, applying moisturizer, make-up and sunscreen.    3) If there are any open or bleeding areas at the incision/graft site you should begin to cover the area with a bandage daily as follows:    1) Clean and dry the area with plain tap water using a Q-tip or sterile gauze pad.  2) Apply Polysporin or Bacitracin ointment to the open area.  3) Cover the wound with a band-aid or a sterile non-stick gauze pad and micropore paper tape.         SIGNS OF INFECTION  - If you notice any of these signs of infection, call your doctor right away: expanding redness around the wound.  - Yellow or greenish-colored pus or cloudy wound drainage.    - Red streaking spreading from the wound.  - Increased swelling, tenderness, or pain around the wound.   - Fever.    Please remember that yellow and clear drainage from a wound can be normal and related to normal wound healing.  Isolated drainage from a wound without a combination of the above features does not indicate infection.       *Once the bandages are removed, the scar will be red and firm (especially in the lip/chin area). This is normal and will fade in time. It might take 6-12 months for this to happen.     *Massaging the area will help the scar soften and fade quicker. Begin to massage the area one month after the bandages have been removed. To massage apply pressure directly and firmly over the scar with the  fingertips and move in a circular motion. Massage the area for a few minutes several times a day. Continue to massage the site for several months.    *Approximately 6-8 weeks after surgery it is not uncommon to see the formation of  tender pimple-like  bump along the scar. This is normal. As the scar continues to mature and the stitches underneath the skin begin to dissolve, this might occur. Do not pick or squeeze, this will resolve on it s own. Should one break open producing a small amount of drainage, apply Polysporin or Bacitracin ointment a few times a day until the wound is completely healed.    *Numbness in the surgical area is expected. It might take 12-18 months for the feeling to return to normal. During this time sensations of itchiness, tingling and occasional sharp pains might be noted. These feelings are normal and will subside once the nerves have completely healed.         IN CASE OF EMERGENCY: Dr Crawford [phone]     If you were seen in [place] call: [phone]    If you were seen in [place] call: [phone]            Follow-ups after your visit        Who to contact     If you have questions or need follow up information about today's clinic visit or your schedule please contact [facility] directly at [phone].  Normal or non-critical lab and imaging results will be communicated to you by TSBhart, letter or phone within 4 business days after the clinic has received the results. If you do not hear from us within 7 days, please contact the clinic through MyChart or phone. If you have a critical or abnormal lab result, we will notify you by phone as soon as possible.  Submit refill requests through 51edj or call your pharmacy and they will forward the refill request to us. Please allow 3 business days for your refill to be completed.          Additional Information About Your Visit        51edj Information     51edj lets you send messages to your  "doctor, view your test results, renew your prescriptions, schedule appointments and more. To sign up, go to [email or web address] . Click on \"Log in\" on the left side of the screen, which will take you to the Welcome page. Then click on \"Sign up Now\" on the right side of the page.     You will be asked to enter the access code listed below, as well as some personal information. Please follow the directions to create your username and password.     Your access code is: [email or web address]  Expires: 2019  1:37 PM     Your access code will  in 90 days. If you need help or a new code, please call your [facility] clinic or [phone].        Care EveryWhere ID     This is your Care EveryWhere ID. This could be used by other organizations to access your [facility] medical records  [id number]        Your Vitals Were     Last Period                   (LMP Unknown)            Blood Pressure from Last 3 Encounters:   10/11/18 130/70   18 135/79   08/10/18 120/70    Weight from Last 3 Encounters:   10/11/18 95.7 kg (211 lb)   08/10/18 95.6 kg (210 lb 11.2 oz)   18 94.3 kg (208 lb)              Today, you had the following     No orders found for display         Today's Medication Changes          These changes are accurate as of 18  1:37 PM.  If you have any questions, ask your nurse or doctor.               These medicines have changed or have updated prescriptions.        Dose/Directions    aspirin 325 MG EC tablet   This may have changed:    - how much to take  - when to take this   Used for:  Failed total knee arthroplasty, initial encounter (H)        Dose:  325 mg   Take 1 tablet (325 mg) by mouth 2 times daily (with meals)   Quantity:  60 tablet   Refills:  0                Primary Care Provider Office Phone # Fax #    Yoselin Mancera [phone] [address] [facility] [address]        Equal Access to Services     ALLI CAMPOS AH: Dieudonne cordero " Sofya, wabiancada luqadaha, qaybta kaalliban lawrence, yasmin mclean romelwenceslao lalawnevin inder. So [facility] [phone].    ATENCIÓN: Si casie caraballo, tiene a severino disposición servicios gratuitos de asistencia lingüística. Bogdan al [phone].    We comply with applicable federal civil rights laws and [place] laws. We do not discriminate on the basis of race, color, national origin, age, disability, sex, sexual orientation, or gender identity.            Thank you!     Thank you for choosing [facility]  for your care. Our goal is always to provide you with excellent care. Hearing back from our patients is one way we can continue to improve our services. Please take a few minutes to complete the written survey that you may receive in the mail after your visit with us. Thank you!             Your Updated Medication List - Protect others around you: Learn how to safely use, store and throw away your medicines at www.disposemymeds.org.          This list is accurate as of 11/1/18  1:37 PM.  Always use your most recent med list.                   Brand Name Dispense Instructions for use Diagnosis    aspirin 325 MG EC tablet     60 tablet    Take 1 tablet (325 mg) by mouth 2 times daily (with meals)    Failed total knee arthroplasty, initial encounter (H)       FLINTSTONES COMPLETE PO      Take 1 tablet by mouth 2 times daily        levothyroxine 50 MCG tablet    SYNTHROID/LEVOTHROID    90 tablet    TAKE 1 TABLET (50 MCG) BY MOUTH DAILY    Hypothyroidism due to acquired atrophy of thyroid       metoprolol succinate 25 MG 24 hr tablet    TOPROL-XL    90 tablet    TAKE 1 TABLET (25 MG) BY MOUTH DAILY    Benign essential hypertension       OMEPRAZOLE PO      Take 20 mg by mouth daily        simvastatin 40 MG tablet    ZOCOR    90 tablet    Take 1 tablet (40 mg) by mouth daily    Hypercholesterolemia       terbinafine 1 % cream    lamISIL     Apply topically daily as needed Applies to spot on  forehead        VITAMIN B 12 PO      Take 2,500 mcg by mouth daily sublingual        vitamin D 1000 units capsule      Take 1 capsule by mouth daily

## 2018-12-22 ENCOUNTER — OFFICE VISIT (OUTPATIENT)
Dept: URGENT CARE | Facility: URGENT CARE | Age: 78
End: 2018-12-22
Payer: MEDICARE

## 2018-12-22 VITALS
WEIGHT: 214.3 LBS | SYSTOLIC BLOOD PRESSURE: 90 MMHG | BODY MASS INDEX: 35.12 KG/M2 | HEART RATE: 74 BPM | OXYGEN SATURATION: 97 % | DIASTOLIC BLOOD PRESSURE: 64 MMHG | RESPIRATION RATE: 16 BRPM

## 2018-12-22 DIAGNOSIS — R09.89 CHEST CONGESTION: ICD-10-CM

## 2018-12-22 DIAGNOSIS — R05.8 PRODUCTIVE COUGH: ICD-10-CM

## 2018-12-22 DIAGNOSIS — R05.9 COUGH: ICD-10-CM

## 2018-12-22 DIAGNOSIS — H10.13 ALLERGIC CONJUNCTIVITIS, BILATERAL: Primary | ICD-10-CM

## 2018-12-22 PROCEDURE — 99214 OFFICE O/P EST MOD 30 MIN: CPT | Performed by: PHYSICIAN ASSISTANT

## 2018-12-22 RX ORDER — AZITHROMYCIN 250 MG/1
TABLET, FILM COATED ORAL
Qty: 6 TABLET | Refills: 0 | Status: SHIPPED | OUTPATIENT
Start: 2018-12-22 | End: 2019-04-09

## 2018-12-22 RX ORDER — BENZONATATE 200 MG/1
200 CAPSULE ORAL 3 TIMES DAILY PRN
Qty: 30 CAPSULE | Refills: 0 | Status: SHIPPED | OUTPATIENT
Start: 2018-12-22 | End: 2019-04-09

## 2018-12-22 RX ORDER — OLOPATADINE HYDROCHLORIDE 1 MG/ML
1 SOLUTION/ DROPS OPHTHALMIC 2 TIMES DAILY
Qty: 9 ML | Refills: 3 | Status: SHIPPED | OUTPATIENT
Start: 2018-12-22 | End: 2019-05-08

## 2018-12-27 DIAGNOSIS — H10.13 ALLERGIC CONJUNCTIVITIS, BILATERAL: ICD-10-CM

## 2018-12-27 NOTE — TELEPHONE ENCOUNTER
"PHARMACY REQUESTING ALTERNATIVE BECAUSE OLOPATADINE 0.1% EYE DROPS IS NOT COVERED BY INSURANCE. PREFERRED ALTERNATIVES ARE: PAZEO 0.7% EYE DROPS, OLOPATADINE 0.2% EYE DROP, BEPREVE 1.5%EYE DROPS, AND AZELASTINE 0.05% DROPS.    Requested Prescriptions   Pending Prescriptions Disp Refills     olopatadine (PATANOL) 0.1 % ophthalmic solution  PHARMACY REQUESTING ALTERNATIVE.  Last Written Prescription Date:  12/22/18  Last Fill Quantity: 9 ML,  # refills: 3   Last office visit: 12/22/2018 with prescribing provider:  MECHELLE   Future Office Visit:     9 mL 3     Sig: Place 1 drop into both eyes 2 times daily    Miscellaneous Opthalmic Allergy Drops Protocol Passed - 12/27/2018  1:24 PM       Passed - Patient is age 4 or older       Passed - Recent (12 mo) or future (30 days) visit within the authorizing provider's specialty    Patient had office visit in the last 12 months or has a visit in the next 30 days with authorizing provider or within the authorizing provider's specialty.  See \"Patient Info\" tab in inbasket, or \"Choose Columns\" in Meds & Orders section of the refill encounter.             Passed - Patient is not pregnant       Passed - No positive pregnancy test on record in past 12 mos          "

## 2018-12-27 NOTE — PROGRESS NOTES
SUBJECTIVE:   Aurora Figueroa is a 78 year old female presenting with a chief complaint of coughing, chest congestion, productive cough, red eyes.  Onset of symptoms was 11 day(s) ago.  Course of illness is worsening.    Severity moderate  Current and Associated symptoms: sweats, stuffy nose, cough - productive and facial pain/pressure  Treatment measures tried include OTC Cough med.  Predisposing factors include recent illness .    Past Medical History:   Diagnosis Date     Basal cell carcinoma      Hyperlipidemia      Hypothyroidism      LUMBOSACRAL NEURITIS NOS 4/12/2007     Sleep apnea     does not wear CPAP     Tachycardia         Allergies   Allergen Reactions     Penicillins Hives     Animal Dander      Pruritis,itchi eyes, throat and ears.     E.E.S. GI Disturbance     Erythromycin GI Disturbance     Fruit [[place]]      Hives, itchy throat tomatoes     Nsaids Other (See Comments)     Patient had gastric bypass surgery.  Should not take oral  NSAID's ever.     Pollen Extract Itching     Family History   Problem Relation Age of Onset     Heart Disease Mother      Skin Cancer Mother      Heart Disease Son      Unknown/Adopted Father      Heart Disease Sister      Skin Cancer Brother      Diabetes No family hx of      Coronary Artery Disease No family hx of      Hypertension No family hx of      Hyperlipidemia No family hx of      Cerebrovascular Disease No family hx of      Breast Cancer No family hx of      Colon Cancer No family hx of      Prostate Cancer No family hx of      Other Cancer No family hx of      Depression No family hx of      Anxiety Disorder No family hx of      Mental Illness No family hx of      Substance Abuse No family hx of      Anesthesia Reaction No family hx of      Asthma No family hx of      Osteoporosis No family hx of      Genetic Disorder No family hx of      Thyroid Disease No family hx of      Obesity No family hx of          Social History     Tobacco Use     Smoking  status: Never Smoker     Smokeless tobacco: Never Used   Substance Use Topics     Alcohol use: Yes     Alcohol/week: 0.0 oz     Comment: rare       ROS:  CONSTITUTIONAL:NEGATIVE for fever, chills, change in weight  INTEGUMENTARY/SKIN: NEGATIVE for worrisome rashes, moles or lesions  EYES: POSITIVE for red eyes, mattering  ENT/MOUTH: POSITIVE for Hx ear infections, postnasal drainage and rhinorrhea-purulent  RESP:POSITIVE for cough-non productive  CV: NEGATIVE for chest pain, palpitations or peripheral edema  GI: NEGATIVE for nausea, abdominal pain, heartburn, or change in bowel habits  : negative for and dysuria  MUSCULOSKELETAL: NEGATIVE for significant arthralgias or myalgia  NEURO: NEGATIVE for weakness, dizziness or paresthesias    OBJECTIVE  :BP 90/64   Pulse 74   Resp 16   Wt 97.2 kg (214 lb 4.8 oz)   LMP  (LMP Unknown)   SpO2 97%   BMI 35.12 kg/m    GENERAL APPEARANCE: healthy, alert and no distress  EYES: conjunctiva/corneas- conjunctival injection OU  HENT: TM's normal bilaterally, nasal turbinates erythematous, swollen, rhinorrhea purulent and maxillary sinus tenderness   NECK: supple, nontender, no lymphadenopathy  RESP: lungs clear to auscultation - no rales, rhonchi or wheezes  CV: regular rates and rhythm, normal S1 S2, no murmur noted  ABDOMEN:  soft, nontender, no HSM or masses and bowel sounds normal  NEURO: Normal strength and tone, sensory exam grossly normal,  normal speech and mentation  SKIN: no suspicious lesions or rashes    ASSESSMENT/PLAN      ICD-10-CM    1. Allergic conjunctivitis, bilateral H10.13 olopatadine (PATANOL) 0.1 % ophthalmic solution   2. Productive cough R05 azithromycin (ZITHROMAX) 250 MG tablet   3. Chest congestion R09.89 azithromycin (ZITHROMAX) 250 MG tablet   4. Cough R05 benzonatate (TESSALON) 200 MG capsule       Orders Placed This Encounter     benzonatate (TESSALON) 200 MG capsule     azithromycin (ZITHROMAX) 250 MG tablet     olopatadine (PATANOL) 0.1 %  ophthalmic solution       Fluids, rest  Wash hands  patanol for eye irritation, reaction  Follow up with PCP as needed  See orders in Epic

## 2019-01-08 RX ORDER — OLOPATADINE HYDROCHLORIDE 1 MG/ML
1 SOLUTION/ DROPS OPHTHALMIC 2 TIMES DAILY
Qty: 9 ML | Refills: 3 | OUTPATIENT
Start: 2019-01-08

## 2019-01-20 DIAGNOSIS — I10 BENIGN ESSENTIAL HYPERTENSION: ICD-10-CM

## 2019-01-21 RX ORDER — METOPROLOL SUCCINATE 25 MG/1
TABLET, EXTENDED RELEASE ORAL
Qty: 90 TABLET | Refills: 2 | Status: SHIPPED | OUTPATIENT
Start: 2019-01-21 | End: 2019-05-08

## 2019-01-21 NOTE — TELEPHONE ENCOUNTER
"Requested Prescriptions   Pending Prescriptions Disp Refills     metoprolol succinate ER (TOPROL-XL) 25 MG 24 hr tablet [Pharmacy Med Name: METOPROLOL SUCC ER 25 MG TAB] 90 tablet 1      Last Written Prescription Date:  7/24/18  Last Fill Quantity: 90,  # refills: 1   Last office visit: 10/11/2018 with prescribing provider:     Future Office Visit:     Sig: TAKE 1 TABLET BY MOUTH EVERY DAY    Beta-Blockers Protocol Passed - 1/21/2019  1:06 PM       Passed - Blood pressure under 140/90 in past 12 months    BP Readings from Last 3 Encounters:   12/22/18 90/64   10/11/18 130/70   09/05/18 135/79                Passed - Patient is age 6 or older       Passed - Recent (12 mo) or future (30 days) visit within the authorizing provider's specialty    Patient had office visit in the last 12 months or has a visit in the next 30 days with authorizing provider or within the authorizing provider's specialty.  See \"Patient Info\" tab in inbasket, or \"Choose Columns\" in Meds & Orders section of the refill encounter.             Passed - Medication is active on med list          "

## 2019-01-21 NOTE — TELEPHONE ENCOUNTER
"Requested Prescriptions   Pending Prescriptions Disp Refills     metoprolol succinate ER (TOPROL-XL) 25 MG 24 hr tablet [Pharmacy Med Name: METOPROLOL SUCC ER 25 MG TAB]  Last Written Prescription Date:  7/24/2018  Last Fill Quantity: 90 tablet,  # refills: 1   Last office visit: 10/11/2018 with prescribing provider:  Jeane   Future Office Visit:     90 tablet 1     Sig: TAKE 1 TABLET BY MOUTH EVERY DAY    Beta-Blockers Protocol Passed - 1/21/2019  1:11 PM       Passed - Blood pressure under 140/90 in past 12 months    BP Readings from Last 3 Encounters:   12/22/18 90/64   10/11/18 130/70   09/05/18 135/79                Passed - Patient is age 6 or older       Passed - Recent (12 mo) or future (30 days) visit within the authorizing provider's specialty    Patient had office visit in the last 12 months or has a visit in the next 30 days with authorizing provider or within the authorizing provider's specialty.  See \"Patient Info\" tab in inbasket, or \"Choose Columns\" in Meds & Orders section of the refill encounter.             Passed - Medication is active on med list           "

## 2019-04-09 ENCOUNTER — OFFICE VISIT (OUTPATIENT)
Dept: DERMATOLOGY | Facility: CLINIC | Age: 79
End: 2019-04-09
Payer: MEDICARE

## 2019-04-09 VITALS — HEART RATE: 64 BPM | OXYGEN SATURATION: 98 % | DIASTOLIC BLOOD PRESSURE: 74 MMHG | SYSTOLIC BLOOD PRESSURE: 120 MMHG

## 2019-04-09 DIAGNOSIS — D22.9 NEVUS: ICD-10-CM

## 2019-04-09 DIAGNOSIS — L81.4 LENTIGO: Primary | ICD-10-CM

## 2019-04-09 DIAGNOSIS — D18.00 ANGIOMA: ICD-10-CM

## 2019-04-09 DIAGNOSIS — L82.1 SEBORRHEIC KERATOSIS: ICD-10-CM

## 2019-04-09 PROCEDURE — 99214 OFFICE O/P EST MOD 30 MIN: CPT | Performed by: PHYSICIAN ASSISTANT

## 2019-04-09 RX ORDER — FERROUS SULFATE 325(65) MG
325 TABLET ORAL
COMMUNITY

## 2019-04-09 NOTE — PROGRESS NOTES
HPI:   Aurora Figueroa is a 79 year old female who presents for Full skin cancer screening.  chief complaint  Last Skin Exam: 6 months ago      1st Baseline: no  Personal HX of Skin Cancer: BCC on left forehead 2018   Personal HX of Malignant Melanoma: none   Family HX of Skin Cancer / Malignant Melanoma: none  Personal HX of Atypical Moles: none  Risk factors: frequent sun exposure in [place], blistering sunburns in youth   New / Changing lesions: yes, spots on nose and left shoulder  Social History: grew up in [place]; has 6 children  On review of systems, there are no further skin complaints, patient is feeling otherwise well.  See patient intake sheet.  ROS of the following were done and are negative: Constitutional, Eyes, Ears, Nose,   Mouth, Throat, Cardiovascular, Respiratory, GI, Genitourinary, Musculoskeletal,   Psychiatric, Endocrine, Allergic/Immunologic.    This document serves as a record of the services and decisions personally performed and made by Marely Aaron, MS, PA-C. It was created on her behalf by Leanna Perry, a trained medical scribe. The creation of this document is based on the provider's statements to the medical scribe.  Leanna Perry 10:03 AM April 9, 2019    PHYSICAL EXAM:   /74   Pulse 64   LMP  (LMP Unknown)   SpO2 98%   Breastfeeding? No   Skin exam performed as follows: Type 2 skin. Mood appropriate  Alert and Oriented X 3. Well developed, well nourished in no distress.  General appearance: Normal  Head including face: Normal  Eyes: conjunctiva and lids: Normal  Mouth: Lips, teeth, gums: Normal  Neck: Normal  Chest-breast/axillae: Normal  Back: Normal  Spleen and liver: Normal  Cardiovascular: Exam of peripheral vascular system by observation for swelling, varicosities, edema: Normal  Genitalia: groin, buttocks: Normal  Extremities: digits/nails (clubbing): Normal  Eccrine and Apocrine glands: Normal  Right upper extremity: Normal  Left  upper extremity: Normal  Right lower extremity: Normal  Left lower extremity: Normal  Skin: Scalp and body hair: See below    Pt deferred exam of breasts, groin, buttocks: No    Other physical findings:  1. Multiple pigmented macules on extremities and trunk  2. Multiple pigmented macules on face, trunk and extremities  3. Multiple vascular papules on trunk, arms and legs  4. Multiple scattered keratotic plaques  5. 5 mm pink shiny papule on right nasal root   6. 11 mm pink plaque on right upper back        Except as noted above, no other signs of skin cancer or melanoma.     ASSESSMENT/PLAN:   Benign Full skin cancer screening today.     Patient with history of BCC on left forehead 2018  Advised on monthly self exams and 1 year  Patient Education: Appropriate brochures given.    Multiple benign appearing nevi on arms, legs and trunk. Discussed ABCDEs of melanoma and sunscreen.   Multiple lentigos on arms, legs and trunk. Advised benign, no treatment needed.  Multiple scattered angiomas. Advised benign, no treatment needed.   Seborrheic keratosis on arms, legs and trunk. Advised benign, no treatment needed.  Will return to r/o BCC on right nasal tip and right upper back. 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 She will schedule for the next few weeks.         Follow-up: biopsy/yearly FSE/PRN sooner     1.) Patient was asked about new and changing moles. YES  2.) Patient received a complete physical skin examination: YES  3.) Patient was counseled to perform a monthly self skin examination: YES  Scribed By: Leanna Perry, Medical Scribe    The information in this document, created by the medical scribe for me, accurately reflects the services I personally performed and the decisions made by me. I have reviewed and approved this document for accuracy prior to leaving the patient care area.  April 9, 2019 10:11 AM    Marely Aaron MS, PA-C

## 2019-04-09 NOTE — LETTER
4/9/2019         RE: Aurora Figueroa  [facility] [address]        Dear Colleague,    Thank you for referring your patient, Aurora Figueroa, to [facility]. Please see a copy of my visit note below.    HPI:   Aurora Figueroa is a 79 year old female who presents for Full skin cancer screening.  chief complaint  Last Skin Exam: 6 months ago      1st Baseline: no  Personal HX of Skin Cancer: BCC on left forehead 2018   Personal HX of Malignant Melanoma: none   Family HX of Skin Cancer / Malignant Melanoma: none  Personal HX of Atypical Moles: none  Risk factors: frequent sun exposure in [place], blistering sunburns in youth   New / Changing lesions: yes, spots on nose and left shoulder  Social History: grew up in [place]; has 6 children  On review of systems, there are no further skin complaints, patient is feeling otherwise well.  See patient intake sheet.  ROS of the following were done and are negative: Constitutional, Eyes, Ears, Nose,   Mouth, Throat, Cardiovascular, Respiratory, GI, Genitourinary, Musculoskeletal,   Psychiatric, Endocrine, Allergic/Immunologic.    This document serves as a record of the services and decisions personally performed and made by Marely Aaron, MS, PA-C. It was created on her behalf by Leanna Perry, a trained medical scribe. The creation of this document is based on the provider's statements to the medical scribe.  Leanna Perry 10:03 AM April 9, 2019    PHYSICAL EXAM:   /74   Pulse 64   LMP  (LMP Unknown)   SpO2 98%   Breastfeeding? No   Skin exam performed as follows: Type 2 skin. Mood appropriate  Alert and Oriented X 3. Well developed, well nourished in no distress.  General appearance: Normal  Head including face: Normal  Eyes: conjunctiva and lids: Normal  Mouth: Lips, teeth, gums: Normal  Neck: Normal  Chest-breast/axillae: Normal  Back: Normal  Spleen and liver:  Normal  Cardiovascular: Exam of peripheral vascular system by observation for swelling, varicosities, edema: Normal  Genitalia: groin, buttocks: Normal  Extremities: digits/nails (clubbing): Normal  Eccrine and Apocrine glands: Normal  Right upper extremity: Normal  Left upper extremity: Normal  Right lower extremity: Normal  Left lower extremity: Normal  Skin: Scalp and body hair: See below    Pt deferred exam of breasts, groin, buttocks: No    Other physical findings:  1. Multiple pigmented macules on extremities and trunk  2. Multiple pigmented macules on face, trunk and extremities  3. Multiple vascular papules on trunk, arms and legs  4. Multiple scattered keratotic plaques  5. 5 mm pink shiny papule on right nasal root   6. 11 mm pink plaque on right upper back        Except as noted above, no other signs of skin cancer or melanoma.     ASSESSMENT/PLAN:   Benign Full skin cancer screening today.     Patient with history of BCC on left forehead 2018  Advised on monthly self exams and 1 year  Patient Education: Appropriate brochures given.    Multiple benign appearing nevi on arms, legs and trunk. Discussed ABCDEs of melanoma and sunscreen.   Multiple lentigos on arms, legs and trunk. Advised benign, no treatment needed.  Multiple scattered angiomas. Advised benign, no treatment needed.   Seborrheic keratosis on arms, legs and trunk. Advised benign, no treatment needed.  Will return to r/o BCC on right nasal tip and right upper back. 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 She will schedule for the next few weeks.         Follow-up: biopsy/yearly FSE/PRN sooner     1.) Patient was asked about new and changing moles. YES  2.) Patient received a complete physical skin examination: YES  3.) Patient was counseled to perform a monthly self skin examination: YES  Scribed By: Leanna Perry, Medical Scribe    The information in this document, created by the medical  scribe for me, accurately reflects the services I personally performed and the decisions made by me. I have reviewed and approved this document for accuracy prior to leaving the patient care area.  April 9, 2019 10:11 AM    Marely Aaron MS, PALucyC      Again, thank you for allowing me to participate in the care of your patient.        Sincerely,        Marely Aaron PA-C

## 2019-04-10 ENCOUNTER — HOSPITAL ENCOUNTER (OUTPATIENT)
Dept: MAMMOGRAPHY | Facility: CLINIC | Age: 79
Discharge: HOME | End: 2019-04-10
Attending: FAMILY MEDICINE | Admitting: FAMILY MEDICINE
Payer: MEDICARE

## 2019-04-10 DIAGNOSIS — Z12.31 VISIT FOR SCREENING MAMMOGRAM: ICD-10-CM

## 2019-04-10 PROCEDURE — 77063 BREAST TOMOSYNTHESIS BI: CPT

## 2019-04-28 DIAGNOSIS — E03.4 HYPOTHYROIDISM DUE TO ACQUIRED ATROPHY OF THYROID: Chronic | ICD-10-CM

## 2019-04-29 NOTE — TELEPHONE ENCOUNTER
"Requested Prescriptions   Pending Prescriptions Disp Refills     levothyroxine (SYNTHROID/LEVOTHROID) 50 MCG tablet [Pharmacy Med Name: LEVOTHYROXINE 50 MCG TABLET]  Last Written Prescription Date:  7/24/2018  Last Fill Quantity: 90 tablet,  # refills: 2   Last office visit: 10/11/2018 with prescribing provider:  Fitterer   Future Office Visit:   Next 5 appointments (look out 90 days)    May 07, 2019  9:45 AM CDT  [facility] Visit with Marely Aaron PA-C  [facility] [address] [address] [phone]          90 tablet 2     Sig: TAKE 1 TABLET (50 MCG) BY MOUTH DAILY       Thyroid Protocol Failed - 4/28/2019  8:50 AM        Failed - Normal TSH on file in past 12 months     Recent Labs   Lab Test 10/11/18  0810   TSH 4.23*              Passed - Patient is 12 years or older        Passed - Recent (12 mo) or future (30 days) visit within the authorizing provider's specialty     Patient had office visit in the last 12 months or has a visit in the next 30 days with authorizing provider or within the authorizing provider's specialty.  See \"Patient Info\" tab in inbasket, or \"Choose Columns\" in Meds & Orders section of the refill encounter.              Passed - Medication is active on med list        Passed - No active pregnancy on record     If patient is pregnant or has had a positive pregnancy test, please check TSH.          Passed - No positive pregnancy test in past 12 months     If patient is pregnant or has had a positive pregnancy test, please check TSH.             "

## 2019-04-30 RX ORDER — LEVOTHYROXINE SODIUM 50 UG/1
TABLET ORAL
Qty: 90 TABLET | Refills: 2 | OUTPATIENT
Start: 2019-04-30

## 2019-05-07 ENCOUNTER — OFFICE VISIT (OUTPATIENT)
Dept: DERMATOLOGY | Facility: CLINIC | Age: 79
End: 2019-05-07
Payer: MEDICARE

## 2019-05-07 VITALS — OXYGEN SATURATION: 97 % | DIASTOLIC BLOOD PRESSURE: 68 MMHG | HEART RATE: 74 BPM | SYSTOLIC BLOOD PRESSURE: 106 MMHG

## 2019-05-07 DIAGNOSIS — D48.5 NEOPLASM OF UNCERTAIN BEHAVIOR OF SKIN: Primary | ICD-10-CM

## 2019-05-07 PROCEDURE — 88305 TISSUE EXAM BY PATHOLOGIST: CPT | Mod: TC | Performed by: PHYSICIAN ASSISTANT

## 2019-05-07 PROCEDURE — 11103 TANGNTL BX SKIN EA SEP/ADDL: CPT | Performed by: PHYSICIAN ASSISTANT

## 2019-05-07 PROCEDURE — 11102 TANGNTL BX SKIN SINGLE LES: CPT | Performed by: PHYSICIAN ASSISTANT

## 2019-05-07 NOTE — PROGRESS NOTES
HPI:   Chief complaints: Aurora Figueroa is a 79 year old female who presents for biopsy of spots on nose and back identified at LOV.       Review Of Systems  Eyes: negative  Ears/Nose/Throat: negative  Respiratory: No shortness of breath, dyspnea on exertion, cough, or hemoptysis  Cardiovascular: negative  Gastrointestinal: negative  Genitourinary: negative  Musculoskeletal: negative  Neurologic: negative  Psychiatric: negative    This document serves as a record of the services and decisions personally performed and made by Marely Aaron, MS, PA-C. It was created on her behalf by Leanna Perry, a trained medical scribe. The creation of this document is based on the provider's statements to the medical scribe.  Leanna Perry 9:44 AM May 7, 2019    PHYSICAL EXAM:    /68   Pulse 74   LMP  (LMP Unknown)   SpO2 97%   Breastfeeding? No   Skin exam performed as follows: Type 2 skin. Mood appropriate  Alert and Oriented X 3. Well developed, well nourished in no distress.  General appearance: Normal  Head including face: Normal  Eyes: conjunctiva and lids: Normal  Mouth: Lips, teeth, gums: Normal  Neck: Normal  Chest-breast/axillae: Normal  Back: Normal  Spleen and liver: Normal  Cardiovascular: Exam of peripheral vascular system by observation for swelling, varicosities, edema: Normal  Genitalia: groin, buttocks: Normal  Extremities: digits/nails (clubbing): Normal  Eccrine and Apocrine glands: Normal  Right upper extremity: Normal  Left upper extremity: Normal  Right lower extremity: Normal  Left lower extremity: Normal  Skin: Scalp and body hair: See below    1. 5 mm pink shiny papule on right nasal root  2. 11 mm pink plaque on right upper back     ASSESSMENT/PLAN:     1. R/o BCC on right nasal  and right upper back. Photos taken and placed in chart. Shave bx in typical fashion .  Area cleaned with betadyne and anesthetized with 1% lidocaine with epi .  Dermablade used to  remove the lesion and sent to pathology. Bleeding was cauterized. Pt tolerated procedure well.        Follow-up: pending path  CC:   Scribed By: Leanna Perry, Medical Scribe    The information in this document, created by the medical scribe for me, accurately reflects the services I personally performed and the decisions made by me. I have reviewed and approved this document for accuracy prior to leaving the patient care area.  May 7, 2019 9:48 AM    Marely Aaron MS, PALucyC

## 2019-05-07 NOTE — LETTER
5/7/2019         RE: Aurora Figueroa  Counts include 234 beds at the [facility] [address]        Dear Colleague,    Thank you for referring your patient, Aurora Figueroa, to the [facility]. Please see a copy of my visit note below.    HPI:   Chief complaints: Aurora Figueroa is a 79 year old female who presents for biopsy of spots on nose and back identified at LOV.       Review Of Systems  Eyes: negative  Ears/Nose/Throat: negative  Respiratory: No shortness of breath, dyspnea on exertion, cough, or hemoptysis  Cardiovascular: negative  Gastrointestinal: negative  Genitourinary: negative  Musculoskeletal: negative  Neurologic: negative  Psychiatric: negative    This document serves as a record of the services and decisions personally performed and made by Marely Aaron, MS, PA-C. It was created on her behalf by Leanna Perry, a trained medical scribe. The creation of this document is based on the provider's statements to the medical scribe.  Leanna Perry 9:44 AM May 7, 2019    PHYSICAL EXAM:    /68   Pulse 74   LMP  (LMP Unknown)   SpO2 97%   Breastfeeding? No   Skin exam performed as follows: Type 2 skin. Mood appropriate  Alert and Oriented X 3. Well developed, well nourished in no distress.  General appearance: Normal  Head including face: Normal  Eyes: conjunctiva and lids: Normal  Mouth: Lips, teeth, gums: Normal  Neck: Normal  Chest-breast/axillae: Normal  Back: Normal  Spleen and liver: Normal  Cardiovascular: Exam of peripheral vascular system by observation for swelling, varicosities, edema: Normal  Genitalia: groin, buttocks: Normal  Extremities: digits/nails (clubbing): Normal  Eccrine and Apocrine glands: Normal  Right upper extremity: Normal  Left upper extremity: Normal  Right lower extremity: Normal  Left lower extremity: Normal  Skin: Scalp and body hair: See below    1. 5 mm pink shiny papule on right nasal root  2. 11 mm  pink plaque on right upper back     ASSESSMENT/PLAN:     1. R/o BCC on right nasal  and right upper back. Photos taken and placed in chart. Shave bx in typical fashion .  Area cleaned with betadyne and anesthetized with 1% lidocaine with epi .  Dermablade used to remove the lesion and sent to pathology. Bleeding was cauterized. Pt tolerated procedure well.        Follow-up: pending path  CC:   Scribed By: Leanna Perry, Medical Scribe    The information in this document, created by the medical scribe for me, accurately reflects the services I personally performed and the decisions made by me. I have reviewed and approved this document for accuracy prior to leaving the patient care area.  May 7, 2019 9:48 AM    Marely Aaron MS, XOCHITL        Again, thank you for allowing me to participate in the care of your patient.        Sincerely,        Marely Aarno PA-C

## 2019-05-07 NOTE — PATIENT INSTRUCTIONS
Wound Care Instructions     FOR SUPERFICIAL WOUNDS     [facility] [phone]                 AFTER 24 HOURS YOU SHOULD REMOVE THE BANDAGE AND BEGIN DAILY DRESSING CHANGES AS FOLLOWS:     1) Remove Dressing.     2) Clean and dry the area with tap water using a Q-tip or sterile gauze pad.     3) Apply Vaseline, Aquaphor, Polysporin ointment or Bacitracin ointment over entire wound.  Do NOT use Neosporin ointment.     4) Cover the wound with a band-aid, or a sterile non-stick gauze pad and micropore paper tape    REPEAT THESE INSTRUCTIONS AT LEAST ONCE A DAY UNTIL THE WOUND HAS COMPLETELY HEALED.    It is an old wives tale that a wound heals better when it is exposed to air and allowed to dry out. The wound will heal faster with a better cosmetic result if it is kept moist with ointment and covered with a bandage.    **Do not let the wound dry out.**    Supplies Needed:      *Cotton tipped applicators (Q-tips)    *Vaseline, Aquaphor, Polysporin or Bacitracin Ointment (NOT NEOSPORIN)    *Band-aids or non-stick gauze pads and micropore paper tape.      PATIENT INFORMATION:    During the healing process you will notice a number of changes. All wounds develop a small halo of redness surrounding the wound.  This means healing is occurring. Severe itching with extensive redness usually indicates sensitivity to the ointment or bandage tape used to dress the wound.  You should call our office if this develops.      Swelling  and/or discoloration around your surgical site is common, particularly when performed around the eye.    All wounds normally drain.  The larger the wound the more drainage there will be.  After 7-10 days, you will notice the wound beginning to shrink and new skin will begin to grow.  The wound is healed when you can see skin has formed over the entire area.  A healed wound has a healthy, shiny look to the surface and is red to dark pink in color to normalize.  Wounds may take approximately  4-6 weeks to heal.  Larger wounds may take 6-8 weeks.  After the wound is healed you may discontinue dressing changes.    You may experience a sensation of tightness as your wound heals. This is normal and will gradually subside.    Your healed wound may be sensitive to temperature changes. This sensitivity improves with time, but if you re having a lot of discomfort, try to avoid temperature extremes.    Patients frequently experience itching after their wound appears to have healed because of the continue healing under the skin.  Plain Vaseline will help relieve the itching.      POSSIBLE COMPLICATIONS    BLEEDIN. Leave the bandage in place.  2. Use tightly rolled up gauze or a cloth to apply direct pressure over the bandage for 30  minutes.  3. Reapply pressure for an additional 30 minutes if necessary  4. Use additional gauze and tape to maintain pressure once the bleeding has stopped.

## 2019-05-08 ENCOUNTER — OFFICE VISIT (OUTPATIENT)
Dept: FAMILY MEDICINE | Facility: CLINIC | Age: 79
End: 2019-05-08
Payer: MEDICARE

## 2019-05-08 VITALS
OXYGEN SATURATION: 98 % | RESPIRATION RATE: 16 BRPM | WEIGHT: 205.5 LBS | DIASTOLIC BLOOD PRESSURE: 66 MMHG | HEART RATE: 55 BPM | SYSTOLIC BLOOD PRESSURE: 118 MMHG | HEIGHT: 66 IN | TEMPERATURE: 97.2 F | BODY MASS INDEX: 33.03 KG/M2

## 2019-05-08 DIAGNOSIS — E03.4 HYPOTHYROIDISM DUE TO ACQUIRED ATROPHY OF THYROID: Chronic | ICD-10-CM

## 2019-05-08 DIAGNOSIS — R73.02 GLUCOSE INTOLERANCE (IMPAIRED GLUCOSE TOLERANCE): ICD-10-CM

## 2019-05-08 DIAGNOSIS — E53.9 B-COMPLEX DEFICIENCY: ICD-10-CM

## 2019-05-08 DIAGNOSIS — I10 BENIGN ESSENTIAL HYPERTENSION: ICD-10-CM

## 2019-05-08 DIAGNOSIS — E78.00 HYPERCHOLESTEROLEMIA: Primary | ICD-10-CM

## 2019-05-08 LAB
ANION GAP SERPL CALCULATED.3IONS-SCNC: 6 MMOL/L (ref 3–14)
BUN SERPL-MCNC: 23 MG/DL (ref 7–30)
CALCIUM SERPL-MCNC: 9.5 MG/DL (ref 8.5–10.1)
CHLORIDE SERPL-SCNC: 108 MMOL/L (ref 94–109)
CHOLEST SERPL-MCNC: 163 MG/DL
CO2 SERPL-SCNC: 26 MMOL/L (ref 20–32)
CREAT SERPL-MCNC: 0.88 MG/DL (ref 0.52–1.04)
ERYTHROCYTE [DISTWIDTH] IN BLOOD BY AUTOMATED COUNT: 14.2 % (ref 10–15)
GFR SERPL CREATININE-BSD FRML MDRD: 63 ML/MIN/{1.73_M2}
GLUCOSE SERPL-MCNC: 114 MG/DL (ref 70–99)
HBA1C MFR BLD: 5.9 % (ref 0–5.6)
HCT VFR BLD AUTO: 41.8 % (ref 35–47)
HDLC SERPL-MCNC: 53 MG/DL
HGB BLD-MCNC: 14.3 G/DL (ref 11.7–15.7)
LDLC SERPL CALC-MCNC: 92 MG/DL
MCH RBC QN AUTO: 31.2 PG (ref 26.5–33)
MCHC RBC AUTO-ENTMCNC: 34.2 G/DL (ref 31.5–36.5)
MCV RBC AUTO: 91 FL (ref 78–100)
NONHDLC SERPL-MCNC: 110 MG/DL
PLATELET # BLD AUTO: 172 10E9/L (ref 150–450)
POTASSIUM SERPL-SCNC: 4 MMOL/L (ref 3.4–5.3)
RBC # BLD AUTO: 4.59 10E12/L (ref 3.8–5.2)
SODIUM SERPL-SCNC: 140 MMOL/L (ref 133–144)
TRIGL SERPL-MCNC: 88 MG/DL
TSH SERPL DL<=0.005 MIU/L-ACNC: 3.1 MU/L (ref 0.4–4)
VIT B12 SERPL-MCNC: >6000 PG/ML (ref 193–986)
WBC # BLD AUTO: 6.3 10E9/L (ref 4–11)

## 2019-05-08 PROCEDURE — 82607 VITAMIN B-12: CPT | Performed by: PHYSICIAN ASSISTANT

## 2019-05-08 PROCEDURE — 83036 HEMOGLOBIN GLYCOSYLATED A1C: CPT | Performed by: PHYSICIAN ASSISTANT

## 2019-05-08 PROCEDURE — 80048 BASIC METABOLIC PNL TOTAL CA: CPT | Performed by: PHYSICIAN ASSISTANT

## 2019-05-08 PROCEDURE — 80061 LIPID PANEL: CPT | Performed by: PHYSICIAN ASSISTANT

## 2019-05-08 PROCEDURE — 84443 ASSAY THYROID STIM HORMONE: CPT | Performed by: PHYSICIAN ASSISTANT

## 2019-05-08 PROCEDURE — 85027 COMPLETE CBC AUTOMATED: CPT | Performed by: PHYSICIAN ASSISTANT

## 2019-05-08 PROCEDURE — 99214 OFFICE O/P EST MOD 30 MIN: CPT | Performed by: PHYSICIAN ASSISTANT

## 2019-05-08 PROCEDURE — 36415 COLL VENOUS BLD VENIPUNCTURE: CPT | Performed by: PHYSICIAN ASSISTANT

## 2019-05-08 RX ORDER — SIMVASTATIN 40 MG
40 TABLET ORAL DAILY
Qty: 90 TABLET | Refills: 3 | Status: SHIPPED | OUTPATIENT
Start: 2019-05-08 | End: 2020-07-21

## 2019-05-08 RX ORDER — METOPROLOL SUCCINATE 25 MG/1
25 TABLET, EXTENDED RELEASE ORAL DAILY
Qty: 90 TABLET | Refills: 3 | Status: SHIPPED | OUTPATIENT
Start: 2019-05-08 | End: 2020-07-21

## 2019-05-08 ASSESSMENT — MIFFLIN-ST. JEOR: SCORE: 1425.95

## 2019-05-08 NOTE — PROGRESS NOTES
SUBJECTIVE:   Aurora Figueroa is a 79 year old female who presents to clinic today for the following   health issues:    Hypothyroidism Follow-up      Since last visit, patient describes the following symptoms: Weight stable, no hair loss, no skin changes, no constipation, no loose stools      Amount of exercise or physical activity: 4-5 days/week for an average of 15-30 minutes    Problems taking medications regularly: No    Medication side effects: none    Diet: low salt    Hyperlipidemia Follow-Up      Rate your low fat/cholesterol diet?: good    Taking statin?  Yes, no muscle aches from statin    Other lipid medications/supplements?:  none    Hypertension Follow-up      Outpatient blood pressures are not being checked.    Low Salt Diet: low salt    Reviewed and updated as needed this visit by clinical staff  Tobacco  Allergies  Meds  Problems  Med Hx  Surg Hx  Fam Hx  Soc Hx        Reviewed and updated as needed this visit by Provider  Tobacco  Allergies  Meds  Problems  Med Hx  Surg Hx  Fam Hx  Soc Hx        Additional complaints: None    HPI additional notes: Aurora presents today with   Chief Complaint   Patient presents with     Thyroid Disease     Hypertension     Lipids          ROS:  C: Negative for fever, chills, recent change in weight  Skin: Negative for worrisome rashes or lesions  ENT: Negative for ear, mouth and throat problems  Resp: Negative for significant cough or SOB  CV: Negative for chest pain or peripheral edema  GI: Negative for nausea, abdominal pain, heartburn, or change in bowel habits  MS: Negative for significant arthralgias or myalgias  P: Negative for changes in mood or affect  ROS all other systems negative.    Chart Review:  History   Smoking Status     Never Smoker   Smokeless Tobacco     Never Used     Recent Labs   Lab Test 10/11/18  0810 08/03/17  1049 02/16/17  1053  06/08/15  1502 01/20/15  1422   A1C  --  5.5 5.6  --   --  6.2*   LDL 88 88 92   < > 66  79   HDL 54  --  61  --  58  --    TRIG 85  --  111  --  84  --    ALT 23 27 23   < > 29 26   CR 0.87  --  0.90   < > 1.00  --    GFRESTIMATED 63  --  61   < > 54*  --    GFRESTBLACK 76  --  74   < > 66  --    POTASSIUM 3.9  --  4.0   < > 4.4  --    TSH 4.23* 2.50  --    < > 2.33  --     < > = values in this interval not displayed.      BP Readings from Last 3 Encounters:   05/08/19 118/66   05/07/19 106/68   04/09/19 120/74    Wt Readings from Last 3 Encounters:   05/08/19 93.2 kg (205 lb 8 oz)   12/22/18 97.2 kg (214 lb 4.8 oz)   10/11/18 95.7 kg (211 lb)                  Patient Active Problem List   Diagnosis     B-complex deficiency     Trigger finger     Ganglion cyst     Bariatric surgery status - 2009     Glucose intolerance (impaired glucose tolerance)     Hypothyroidism due to acquired atrophy of thyroid     ACP (advance care planning)     Failed total knee arthroplasty, initial encounter (H)     Anemia due to blood loss, acute     Benign essential hypertension     Status post total right knee replacement on 2/4/16 by Vincent Allen MD     Acute cystitis with hematuriam w hx of recurrence      Non morbid obesity due to excess calories s/po bariatric surg 2009 w comorbid HTN, hi LDL     Hypercholesterolemia     Tachycardia since 1978     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     GYN SURGERY  1978    c section     HERNIA REPAIR   "1990    umbilical 1990     ORTHOPEDIC SURGERY  1993, 1995    arthroscopic     ORTHOPEDIC SURGERY  1996    Bilateral TKA     ORTHOPEDIC SURGERY  2008    Left hip replacement.     Problem list, Medication list, Allergies, Medical/Social/Surg hx reviewed in [facility], updated as appropriate.   OBJECTIVE:                                                    /66 (BP Location: Left arm, Patient Position: Sitting, Cuff Size: Adult Large)   Pulse 55   Temp 97.2  F (36.2  C) (Tympanic)   Resp 16   Ht 1.664 m (5' 5.5\")   Wt 93.2 kg (205 lb 8 oz)   LMP  (LMP Unknown)   SpO2 98%   Breastfeeding? No   BMI 33.68 kg/m    Body mass index is 33.68 kg/m .  GENERAL: healthy, alert, in no acute distress  EYES: Grossly normal to inspection, EOMI, PERRL  HENT: Ear canals normal; TMs pearly gray without effusion. Nasal mucosa moist without edema or discharge. Oral mucous membranes moist, no lesions or ulcerations. Pharynx pink.  Uvula midline.  No postnasal drainage. Sinuses non-tender to palpation.  NECK: Non-tender, no adenopathy.  RESP: lungs clear to auscultation - no rales, no rhonchi, no wheezes  CV: regular rate and rhythm, normal S1 S2. No peripheral edema.  MS: no gross deformities noted.  No CVA tenderness. No paralumbar tenderness.  SKIN: no suspicious lesions, no rashes  PSYCH: Alert and oriented times 3;  Able to articulate logical thoughts. Affect is normal.    Diagnostic test results:  Pending     ASSESSMENT/PLAN:                                                      BMI:   Estimated body mass index is 33.68 kg/m  as calculated from the following:    Height as of this encounter: 1.664 m (5' 5.5\").    Weight as of this encounter: 93.2 kg (205 lb 8 oz).   Weight management plan: Discussed healthy diet and exercise guidelines        ICD-10-CM    1. Hypercholesterolemia E78.00 Lipid panel reflex to direct LDL Fasting     simvastatin (ZOCOR) 40 MG tablet   2. Glucose intolerance (impaired glucose tolerance) R73.02 Basic " metabolic panel     Hemoglobin A1c   3. Hypothyroidism due to acquired atrophy of thyroid E03.4 TSH with free T4 reflex   4. B-complex deficiency E53.9 CBC with platelets     Vitamin B12   5. Benign essential hypertension I10 metoprolol succinate ER (TOPROL-XL) 25 MG 24 hr tablet     Will send thyroid medication refills once labs return.  Will call pt if dose change is indicated.    Labs today, other medications refilled.    Please see patient instructions for treatment details.    Return in about 6 months (around 11/8/2019) for Annual Exam.    Darlene Nicole PA-C  [facility]

## 2019-05-08 NOTE — LETTER
"May 9, 2019      Aurora Figueroa  [address] [facility] [address]        Dear ,    We are writing to inform you of your test results.    - Your Cholesterol is normal.  - Your metabolic panel shows:  normal electrolytes (sodium, potassium, calcium) normal kidney function (creatinine and GFR) and a slightly elevated fasting blood sugar (100-125), in the range of \"pre-diabetes\".  Try to decrease sugars and carbohydrates from your diet and increase exercise to keep those numbers down.  We'll recheck next year.  - Hemoglobin A1c is a test shows your blood sugar level over the last 2-3 months. A normal result for someone who does not have diabetes is 4-5.7% (fasting blood sugar <100). - Your A1c is in the pre-diabetic range and we want to make sure it doesn't reach 6.5%.  Try to decrease sugars and carbohydrates from your diet and increase exercise to keep those numbers down.  We'll recheck next year.  - Your TSH was normal, indicating that you are on the correct dose of thyroid medication.  I sent additional refills of your current medication dose to your pharmacy.  - Your Blood Count Results show normal White Blood Cell count (no sign of infection), normal Hemoglobin (not anemia), and normal Platelets (affects clotting).  - Please ignore any other labs that are flagged as high or low that I have not mentioned. These are normal variations and not a cause for concern.      Resulted Orders   TSH with free T4 reflex   Result Value Ref Range    TSH 3.10 0.40 - 4.00 mU/L   Basic metabolic panel   Result Value Ref Range    Sodium 140 133 - 144 mmol/L    Potassium 4.0 3.4 - 5.3 mmol/L    Chloride 108 94 - 109 mmol/L    Carbon Dioxide 26 20 - 32 mmol/L    Anion Gap 6 3 - 14 mmol/L    Glucose 114 (H) 70 - 99 mg/dL      Comment:      Fasting specimen    Urea Nitrogen 23 7 - 30 mg/dL    Creatinine 0.88 0.52 - 1.04 mg/dL    GFR Estimate 63 >60 mL/min/[1.73_m2]    Calcium 9.5 8.5 - 10.1 mg/dL   Lipid " panel reflex to direct LDL Fasting   Result Value Ref Range    Cholesterol 163 <200 mg/dL    Triglycerides 88 <150 mg/dL      Comment:      Fasting specimen    HDL Cholesterol 53 >49 mg/dL    LDL Cholesterol Calculated 92 <100 mg/dL      Comment:      Desirable:       <100 mg/dl    Non HDL Cholesterol 110 <130 mg/dL   CBC with platelets   Result Value Ref Range    WBC 6.3 4.0 - 11.0 10e9/L    RBC Count 4.59 3.8 - 5.2 10e12/L    Hemoglobin 14.3 11.7 - 15.7 g/dL    Hematocrit 41.8 35.0 - 47.0 %    MCV 91 78 - 100 fl    MCH 31.2 26.5 - 33.0 pg    MCHC 34.2 31.5 - 36.5 g/dL    RDW 14.2 10.0 - 15.0 %    Platelet Count 172 150 - 450 10e9/L   Vitamin B12   Result Value Ref Range    Vitamin B12 >6,000 (H) 193 - 986 pg/mL   Hemoglobin A1c   Result Value Ref Range    Hemoglobin A1C 5.9 (H) 0 - 5.6 %      Comment:      Normal <5.7% Prediabetes 5.7-6.4%  Diabetes 6.5% or higher - adopted from ADA   consensus guidelines.         If you have any questions or concerns, please call the clinic at the number listed above.       Sincerely,        Darlene Nicole PA-C

## 2019-05-09 RX ORDER — LEVOTHYROXINE SODIUM 50 UG/1
TABLET ORAL
Qty: 90 TABLET | Refills: 3 | Status: SHIPPED | OUTPATIENT
Start: 2019-05-09 | End: 2020-05-01

## 2019-05-09 NOTE — RESULT ENCOUNTER NOTE
"Lab letter printed and signed.  Message comments below:  - Your Cholesterol is normal.  - Your metabolic panel shows:  normal electrolytes (sodium, potassium, calcium) normal kidney function (creatinine and GFR) and a slightly elevated fasting blood sugar (100-125), in the range of \"pre-diabetes\".  Try to decrease sugars and carbohydrates from your diet and increase exercise to keep those numbers down.  We'll recheck next year.  - Hemoglobin A1c is a test shows your blood sugar level over the last 2-3 months. A normal result for someone who does not have diabetes is 4-5.7% (fasting blood sugar <100). - Your A1c is in the pre-diabetic range and we want to make sure it doesn't reach 6.5%.  Try to decrease sugars and carbohydrates from your diet and increase exercise to keep those numbers down.  We'll recheck next year.  - Your TSH was normal, indicating that you are on the correct dose of thyroid medication.  I sent additional refills of your current medication dose to your pharmacy.  - Your Blood Count Results show normal White Blood Cell count (no sign of infection), normal Hemoglobin (not anemia), and normal Platelets (affects clotting).  - Please ignore any other labs that are flagged as high or low that I have not mentioned. These are normal variations and not a cause for concern."

## 2019-05-13 ENCOUNTER — TELEPHONE (OUTPATIENT)
Dept: DERMATOLOGY | Facility: CLINIC | Age: 79
End: 2019-05-13

## 2019-05-13 LAB — COPATH REPORT: NORMAL

## 2019-05-13 NOTE — TELEPHONE ENCOUNTER
Called and spoke to patient. Educated patient on biopsy results- SCC + benign. Educated patient on SCC, mohs, scheduled mohs, and letter/packet sent. Patient voiced understanding.    Adonis RN-BSN  [facility]  [phone]

## 2019-05-13 NOTE — TELEPHONE ENCOUNTER
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

## 2019-05-13 NOTE — LETTER
[facility]  [address]  [address]  [phone]    5/13/2019       Aurora Figueroa  [address] [place] [id number]      Dear Aurora:    You are scheduled for Mohs Surgery on: 6/13/19 @7:15am.    Please check in at [facility], [address].     You don't need to arrive more than 5-10 minutes prior to your appointment time.     Be sure to eat a good breakfast and bathe and wash your hair prior to surgery.     If you are taking any anti-coagulants that are prescribed by your Doctor (such as Coumadin/Warfarin, Plavix, Aspirin, Ibuprofen), please continue taking them.     However, if you are taking anti-coagulants over the counter without a Doctor's order for a medical condition, please discontinue them 10 days prior to surgery.     Please wear loose comfortable clothing as it could possibly be 4-6 hours until your surgery is completed depending upon how many layers of tissue need to be removed.      Thank you,    JANES Crawford MD

## 2019-06-06 ENCOUNTER — TELEPHONE (OUTPATIENT)
Dept: FAMILY MEDICINE | Facility: CLINIC | Age: 79
End: 2019-06-06

## 2019-06-06 NOTE — TELEPHONE ENCOUNTER
Patient called to say that when she wiped, the tissue was stained pink. She states that this happened once about a month ago.  She denies pain/discomfort and burning/itching. Pt also denies any clots or discharge. She says she has no other symptoms, but thought that since this is happening to her again, she should get it checked out.   Patient scheduled OV for tomorrow and will bring tissue if this happens again and is unsoiled.   Routing to provider as FYI. No further action needed.

## 2019-06-07 ENCOUNTER — OFFICE VISIT (OUTPATIENT)
Dept: FAMILY MEDICINE | Facility: CLINIC | Age: 79
End: 2019-06-07
Payer: MEDICARE

## 2019-06-07 VITALS
SYSTOLIC BLOOD PRESSURE: 118 MMHG | HEART RATE: 71 BPM | BODY MASS INDEX: 33.43 KG/M2 | DIASTOLIC BLOOD PRESSURE: 64 MMHG | TEMPERATURE: 96.6 F | WEIGHT: 208 LBS | HEIGHT: 66 IN | OXYGEN SATURATION: 97 % | RESPIRATION RATE: 12 BRPM

## 2019-06-07 DIAGNOSIS — E66.09 NON MORBID OBESITY DUE TO EXCESS CALORIES: ICD-10-CM

## 2019-06-07 DIAGNOSIS — R73.02 GLUCOSE INTOLERANCE (IMPAIRED GLUCOSE TOLERANCE): ICD-10-CM

## 2019-06-07 DIAGNOSIS — E78.00 HYPERCHOLESTEROLEMIA: ICD-10-CM

## 2019-06-07 DIAGNOSIS — E53.9 B-COMPLEX DEFICIENCY: ICD-10-CM

## 2019-06-07 DIAGNOSIS — N95.0 POST-MENOPAUSAL BLEEDING: Primary | ICD-10-CM

## 2019-06-07 LAB
ALT SERPL W P-5'-P-CCNC: 24 U/L (ref 0–50)
CHOLEST SERPL-MCNC: 163 MG/DL
HDLC SERPL-MCNC: 58 MG/DL
LDLC SERPL CALC-MCNC: 85 MG/DL
NONHDLC SERPL-MCNC: 105 MG/DL
TRIGL SERPL-MCNC: 98 MG/DL

## 2019-06-07 PROCEDURE — 99214 OFFICE O/P EST MOD 30 MIN: CPT | Performed by: FAMILY MEDICINE

## 2019-06-07 PROCEDURE — 36415 COLL VENOUS BLD VENIPUNCTURE: CPT | Performed by: FAMILY MEDICINE

## 2019-06-07 PROCEDURE — 84460 ALANINE AMINO (ALT) (SGPT): CPT | Performed by: FAMILY MEDICINE

## 2019-06-07 PROCEDURE — 80061 LIPID PANEL: CPT | Performed by: FAMILY MEDICINE

## 2019-06-07 ASSESSMENT — MIFFLIN-ST. JEOR: SCORE: 1437.29

## 2019-06-07 NOTE — NURSING NOTE
"Chief Complaint   Patient presents with     Vaginal Bleeding     /64   Pulse 71   Temp 96.6  F (35.9  C) (Tympanic)   Resp 12   Ht 1.664 m (5' 5.5\")   Wt 94.3 kg (208 lb)   LMP  (LMP Unknown)   SpO2 97%   Breastfeeding? No   BMI 34.09 kg/m   Estimated body mass index is 34.09 kg/m  as calculated from the following:    Height as of this encounter: 1.664 m (5' 5.5\").    Weight as of this encounter: 94.3 kg (208 lb).  BP completed using cuff size: large   Syeda Her CMA    Health Maintenance Due   Topic Date Due     ZOSTER IMMUNIZATION (2 of 3) 11/27/2009     DEXA  09/17/2017     Health Maintenance reviewed at today's visit patient asked to schedule/complete:   Immunizations:  Patient agrees to schedule    "

## 2019-06-07 NOTE — PROGRESS NOTES
Subjective     Aurora Figueroa is a 79 year old female who presents to clinic today for the following health issues:    HPI     Vaginal spotting       Onset: 3-19 and last on  06/06/2019 & 3 x  prior  Over last 3 mo     Description:  Duration of bleeding episodes: 1x with wiping   1st 2 times were on arising   Frequency between periods:  NA LMP 51 y/o  Describe bleeding/flow:   Clots: no  Number of pads/hour:  NA  Cramping: NA    Intensity:  mild    Accompanying signs and symptoms: None    History (similar episodes/previous evaluation): Yes, two or three times in the last three months    Precipitating or alleviating factors: None    Therapies tried and outcome: None    Glucose Intolerance Follow-up       Patient is checking blood sugars: not at all    HgbA1C = 5.9 in 5-19    Diabetic concerns: None     Symptoms of hypoglycemia (low blood sugar): none     Paresthesias (numbness or burning in feet) or sores: No     Date of last diabetic eye exam: 2016    Hyperlipidemia:LDL Follow-up      Rate your low fat/cholesterol diet?: good    Taking statin?  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 HTN         Amount of exercise or physical activity: None    Problems taking medications regularly: No    Medication side effects: none    Diet: regular (no restrictions)              Reviewed and updated as needed this visit by Provider         Review of Systems   ROS COMP: CONSTITUTIONAL: NEGATIVE for fever, chills, change in weight  INTEGUMENTARY/SKIN: NEGATIVE for worrisome  rashes, moles or lesions  EYES: NEGATIVE for vision changes or irritation  ENT/MOUTH: NEGATIVE for ear, mouth and throat problems  RESP: NEGATIVE for significant cough or SOB  BREAST: NEGATIVE for masses, tenderness or discharge  CV: NEGATIVE for chest pain, palpitations or peripheral edema  GI: NEGATIVE for nausea, abdominal pain, heartburn, or change in bowel habits   female: irregular vaginal bleeding  MUSCULOSKELETAL: NEGATIVE for significant arthralgias or myalgia  NEURO: NEGATIVE for weakness, dizziness or paresthesias  ENDOCRINE: NEGATIVE for temperature intolerance, skin/hair changes  HEME: NEGATIVE for bleeding problems  PSYCHIATRIC: NEGATIVE for changes in mood or affect      Objective    LMP  (LMP Unknown)   There is no height or weight on file to calculate BMI.  Physical Exam   GENERAL: healthy, alert, no distress, obese, pale, frail, elderly and fatigued  EYES: Eyes grossly normal to inspection, PERRL and conjunctivae and sclerae normal  RESP: lungs clear to auscultation - no rales, rhonchi or wheezes  CV: regular rate and rhythm, normal S1 S2, no S3 or S4, no murmur, click or rub, no peripheral edema and peripheral pulses strong  MS: no gross musculoskeletal defects noted, no edema  SKIN: no suspicious lesions or rashes  NEURO: Normal strength and tone, mentation intact and speech normal  PSYCH: mentation appears normal, affect normal/bright    Diagnostic Test Results:  Labs reviewed in Epic  Results for orders placed or performed in visit on 05/08/19   TSH with free T4 reflex   Result Value Ref Range    TSH 3.10 0.40 - 4.00 mU/L   Basic metabolic panel   Result Value Ref Range    Sodium 140 133 - 144 mmol/L    Potassium 4.0 3.4 - 5.3 mmol/L    Chloride 108 94 - 109 mmol/L    Carbon Dioxide 26 20 - 32 mmol/L    Anion Gap 6 3 - 14 mmol/L    Glucose 114 (H) 70 - 99 mg/dL    Urea Nitrogen 23 7 - 30 mg/dL    Creatinine 0.88 0.52 - 1.04 mg/dL    GFR Estimate 63 >60 mL/min/[1.73_m2]    GFR Estimate If  "Black 73 >60 mL/min/[1.73_m2]    Calcium 9.5 8.5 - 10.1 mg/dL   Lipid panel reflex to direct LDL Fasting   Result Value Ref Range    Cholesterol 163 <200 mg/dL    Triglycerides 88 <150 mg/dL    HDL Cholesterol 53 >49 mg/dL    LDL Cholesterol Calculated 92 <100 mg/dL    Non HDL Cholesterol 110 <130 mg/dL   CBC with platelets   Result Value Ref Range    WBC 6.3 4.0 - 11.0 10e9/L    RBC Count 4.59 3.8 - 5.2 10e12/L    Hemoglobin 14.3 11.7 - 15.7 g/dL    Hematocrit 41.8 35.0 - 47.0 %    MCV 91 78 - 100 fl    MCH 31.2 26.5 - 33.0 pg    MCHC 34.2 31.5 - 36.5 g/dL    RDW 14.2 10.0 - 15.0 %    Platelet Count 172 150 - 450 10e9/L   Vitamin B12   Result Value Ref Range    Vitamin B12 >6,000 (H) 193 - 986 pg/mL   Hemoglobin A1c   Result Value Ref Range    Hemoglobin A1C 5.9 (H) 0 - 5.6 %           Assessment & Plan       ICD-10-CM    1. Post-menopausal bleeding-recurrent spotting since 3-19  N95.0 US Pelvic Complete w Transvaginal     CANCELED: US Pelvic Complete w Transvaginal   2. Hypercholesterolemia E78.00 Lipid panel reflex to direct LDL Fasting     ALT   3. Glucose intolerance (impaired glucose tolerance) R73.02    4. 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 Non morbid obesity due to excess calories s/po bariatric surg 2009 w comorbid HTN, hi LDL E66.09         BMI:   Estimated body mass index is 34.09 kg/m  as calculated from the following:    Height as of this encounter: 1.664 m (5' 5.5\").    Weight as of this encounter: 94.3 kg (208 lb).   Weight management plan: Discussed healthy diet and exercise guidelines        Patient Instructions   1. Shingrex is a 2 shot series that prevents shingles 97% of the time, as opposed to the old shingles shot that only prevented it at 40-50%  It costs less for medicare at a pharmacy  You should get it starting at 50 yrs old get the 2nd shot 5-6 mo after the first one    2. Weight Loss Tips  1. Do not eat after 6 hrs before your expected bedtime  2. Have your heaviest meal for breakfast, a " slightly lighter meal at lunch and a snack 6 hrs before bed  3. No sugar/calorie drinks except milk ie no fruit juice, pop, alcohol.  4. Drink milk 30min before meals to decrease your hunger. Also it is excellent as part of your last meal of the day snack  5. Drink lots of water  6. Increase fiber in diet: all bran cereal, salads, popcorn etc  7. Have only one small serving of fruit a day about 1/2 cup (as this is high in sugar)  8. EXERCISE is the bottom line. Without it, you will gain weight even on a low calorie diet. Best if done 2-3X a day as can    Being overweight contributes to high blood pressure and high cholesterol, both of which cause heart attacks, strokes and kidney failure, prediabetes and diabetes, arthritis, and liver disease     3. The only way known to prevent diabetes or keep it from getting worse is exercise, 20-40 minutes 3 times a day around the time of meals as your insulin is wearing out  You need to get rid of the sugar using your muscles     4. Cut down on the iron   Take one 2 x a wk instead of every day \    5. Stop the aspirin               Return in about 4 months (around 10/7/2019) for Physical Exam, BP Recheck.    Yoselin Mancera MD  [facility]

## 2019-06-07 NOTE — PATIENT INSTRUCTIONS
1. Shingrex is a 2 shot series that prevents shingles 97% of the time, as opposed to the old shingles shot that only prevented it at 40-50%  It costs less for medicare at a pharmacy  You should get it starting at 50 yrs old get the 2nd shot 5-6 mo after the first one    2. Weight Loss Tips  1. Do not eat after 6 hrs before your expected bedtime  2. Have your heaviest meal for breakfast, a slightly lighter meal at lunch and a snack 6 hrs before bed  3. No sugar/calorie drinks except milk ie no fruit juice, pop, alcohol.  4. Drink milk 30min before meals to decrease your hunger. Also it is excellent as part of your last meal of the day snack  5. Drink lots of water  6. Increase fiber in diet: all bran cereal, salads, popcorn etc  7. Have only one small serving of fruit a day about 1/2 cup (as this is high in sugar)  8. EXERCISE is the bottom line. Without it, you will gain weight even on a low calorie diet. Best if done 2-3X a day as can    Being overweight contributes to high blood pressure and high cholesterol, both of which cause heart attacks, strokes and kidney failure, prediabetes and diabetes, arthritis, and liver disease     3. The only way known to prevent diabetes or keep it from getting worse is exercise, 20-40 minutes 3 times a day around the time of meals as your insulin is wearing out  You need to get rid of the sugar using your muscles     4. Cut down on the iron   Take one 2 x a wk instead of every day \    5. Stop the aspirin

## 2019-06-07 NOTE — LETTER
"[facility]  [address] [place] [address]  [phone]    6/7/2019     Aurora Figueroa  [facility] [id number]    Dear Aurora:    Here are the results of your latest lipid tests:    LDL Cholesterol Calculated   Date Value Ref Range Status   06/07/2019 85 <100 mg/dL Final     Comment:     Desirable:       <100 mg/dl   ]  HDL Cholesterol   Date Value Ref Range Status   06/07/2019 58 >49 mg/dL Final   ]  Triglycerides   Date Value Ref Range Status   06/07/2019 98 <150 mg/dL Final     Comment:     Fasting specimen   ]  Cholesterol   Date Value Ref Range Status   06/07/2019 163 <200 mg/dL Final   ]    LDL is the \"bad\" cholesterol linked to heart disease and stroke.   HDL is the \"good\" choesterol and when it is high, it decreases the risk for above problems.    Follow a low-fat, low-cholesterol diet and get regular exercise.  Please feel free to call the clinic if you have any questions.    Sincerely,      Yoselin Mancera   "

## 2019-06-13 ENCOUNTER — OFFICE VISIT (OUTPATIENT)
Dept: DERMATOLOGY | Facility: CLINIC | Age: 79
End: 2019-06-13
Payer: MEDICARE

## 2019-06-13 VITALS — HEART RATE: 71 BPM | DIASTOLIC BLOOD PRESSURE: 70 MMHG | OXYGEN SATURATION: 97 % | SYSTOLIC BLOOD PRESSURE: 128 MMHG

## 2019-06-13 DIAGNOSIS — D04.39 SQUAMOUS CELL CARCINOMA IN SITU OF SKIN OF NOSE: Primary | ICD-10-CM

## 2019-06-13 PROCEDURE — 17311 MOHS 1 STAGE H/N/HF/G: CPT | Performed by: DERMATOLOGY

## 2019-06-13 PROCEDURE — 11313 SHAVE SKIN LESION >2.0 CM: CPT | Mod: 51 | Performed by: DERMATOLOGY

## 2019-06-13 NOTE — LETTER
6/13/2019         RE: Aurora Figueroa  [address] [facility] [id number]        Dear Colleague,    Thank you for referring your patient, Aurora Figueroa, to the [facility]. Please see a copy of my visit note below.    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   .  Patient states this has been present for a while.  Patient reports the following symptoms:  crusting.  Patient reports the following previous treatments cryo .  Patient reports the following modifying factors none.  Associated symptoms: none.  Patient has no other skin complaints today.  Remainder of the HPI, Meds, PMH, Allergies, FH, and SH was reviewed in chart.      Past Medical History:   Diagnosis Date     Basal cell carcinoma      Hyperlipidemia      Hypothyroidism      LUMBOSACRAL NEURITIS NOS 4/12/2007     Sleep apnea     does not wear CPAP     Squamous cell carcinoma      Tachycardia        Past Surgical History:   Procedure Laterality Date     ABDOMEN SURGERY  9/2009    gastric bypass 09/2009     APPENDECTOMY  1970    1970     ARTHROPLASTY REVISION KNEE Right 2/4/2016    Procedure: ARTHROPLASTY REVISION KNEE;  Surgeon: Vincent Allen MD;  Location:  OR     COLONOSCOPY  7/14/2014    Procedure: COLONOSCOPY;  Surgeon: Jamari Jj MD;  Location:  GI     ENT SURGERY  1947    tosillectomy      EYE SURGERY  2011    macular repair 2011 left eye     EYE SURGERY  2012    cataract removal left eye.     GYN SURGERY  1978    c section     HERNIA REPAIR  1990    umbilical 1990     ORTHOPEDIC SURGERY  1993, 1995    arthroscopic     ORTHOPEDIC SURGERY  1996    Bilateral TKA     ORTHOPEDIC SURGERY  2008    Left hip replacement.        Family History   Problem Relation Age of Onset     Heart  Disease Mother      Skin Cancer Mother      Heart Disease Son      Unknown/Adopted Father      Heart Disease Sister      Skin Cancer Brother      Diabetes No family hx of      Coronary Artery Disease No family hx of      Hypertension No family hx of      Hyperlipidemia No family hx of      Cerebrovascular Disease No family hx of      Breast Cancer No family hx of      Colon Cancer No family hx of      Prostate Cancer No family hx of      Other Cancer No family hx of      Depression No family hx of      Anxiety Disorder No family hx of      Mental Illness No family hx of      Substance Abuse No family hx of      Anesthesia Reaction No family hx of      Asthma No family hx of      Osteoporosis No family hx of      Genetic Disorder No family hx of      Thyroid Disease No family hx of      Obesity No family hx of        Social History     Socioeconomic History     Marital status:      Spouse name: Not on file     Number of children: Not on file     Years of education: Not on file     Highest education level: Not on file   Occupational History     Not on file   Social Needs     Financial resource strain: Not on file     Food insecurity:     Worry: Not on file     Inability: Not on file     Transportation needs:     Medical: Not on file     Non-medical: Not on file   Tobacco Use     Smoking status: Never Smoker     Smokeless tobacco: Never Used   Substance and Sexual Activity     Alcohol use: Yes     Alcohol/week: 0.0 oz     Comment: rare     Drug use: No     Sexual activity: Never     Partners: Male   Lifestyle     Physical activity:     Days per week: Not on file     Minutes per session: Not on file     Stress: Not on file   Relationships     Social connections:     Talks on phone: Not on file     Gets together: Not on file     Attends Orthodox service: Not on file     Active member of club or organization: Not on file     Attends meetings of clubs or organizations: Not on file     Relationship status: Not on  file     Intimate partner violence:     Fear of current or ex partner: Not on file     Emotionally abused: Not on file     Physically abused: Not on file     Forced sexual activity: Not on file   Other Topics Concern     Parent/sibling w/ CABG, MI or angioplasty before 65F 55M? No   Social History Narrative     Not on file       Outpatient Encounter Medications as of 6/13/2019   Medication Sig Dispense Refill     Cholecalciferol (VITAMIN D) 1000 UNITS capsule Take 1 capsule by mouth daily        Cyanocobalamin (VITAMIN B 12 PO) Take 2,500 mcg by mouth daily sublingual       ferrous sulfate (FEROSUL) 325 (65 Fe) MG tablet Take 325 mg by mouth daily       levothyroxine (SYNTHROID/LEVOTHROID) 50 MCG tablet TAKE 1 TABLET (50 MCG) BY MOUTH DAILY 90 tablet 3     metoprolol succinate ER (TOPROL-XL) 25 MG 24 hr tablet Take 1 tablet (25 mg) by mouth daily 90 tablet 3     OMEPRAZOLE PO Take 20 mg by mouth daily        Pediatric Multivit-Minerals-C (FLINTSTONES COMPLETE PO) Take 1 tablet by mouth 2 times daily       simvastatin (ZOCOR) 40 MG tablet Take 1 tablet (40 mg) by mouth daily 90 tablet 3     No facility-administered encounter medications on file as of 6/13/2019.              Review Of Systems  Skin: As above  Eyes: negative  Ears/Nose/Throat: negative  Respiratory: No shortness of breath, dyspnea on exertion, cough, or hemoptysis  Cardiovascular: negative  Gastrointestinal: negative  Genitourinary: negative  Musculoskeletal: negative  Neurologic: negative  Psychiatric: negative  Hematologic/Lymphatic/Immunologic: negative  Endocrine: negative      O:   NAD, WDWN, Alert & Oriented, Mood & Affect wnl, Vitals stable   Here today alone   /70   Pulse 71   LMP  (LMP Unknown)   SpO2 97%   Breastfeeding? No    General appearance normal   Vitals stable   Alert, oriented and in no acute distress      Following lymph nodes palpated: Occipital, Cervical, Supraclavicular no lad   R NSW ill-defined 7mm red scaly  papule      Eyes: Conjunctivae/lids:Normal     ENT: Lips, buccal mucosa, tongue: normal    MSK:Normal    Cardiovascular: peripheral edema none    Pulm: Breathing Normal    Neuro/Psych: Orientation:Normal; Mood/Affect:Normal      A/P:  1. R NSW squamous cell carcinoma in situ   MOHS:   Ill-defined margins    After PGACAC discussed with patient, decision for Mohs surgery was made. Indication for Mohs was Ill-defined margins. Patient confirmed the site with Dr. Crawford.  After anesthesia with LEC, the tumor was excised using standard Mohs technique in 1 stages(s).  CLEAR MARGINS OBTAINED and Final defect size was 1.2 x 1.3 cm.       DERMABRASION: After PGACAC discussed with patient, decision for tangential excision and dermabrasion was made. After anesthesia with Lido/Epi/Clinda and prep with hibiclens, hypertrophic areas were tangentially excised and entire cosmetic unit was smoothed 2.2cm. Hemostasis was obtained with pressure. Patient tolerated procedure well. There were no complications and EBL minimal. Patient advised to keep abraded surfaces covered with generous ointment until healed, approximately 2 weeks. Return to office in 3 months or prn.  BENIGN LESIONS DISCUSSED WITH PATIENT:  I discussed the specifics of tumor, prognosis, and genetics of benign lesions.  I explained that treatment of these lesions would be purely cosmetic and not medically neccessary.  I discussed with patient different removal options including excision, cautery and /or laser.      Nature and genetics of benign skin lesions dicussed with patient.  Signs and Symptoms of skin cancer discussed with patient.  Patient encouraged to perform monthly skin exams.  UV precautions reviewed with patient.  Patient to follow up with Primary Care provider regarding elevated blood pressure.  Skin care regimen reviewed with patient: Eliminate harsh soaps, i.e. Dial, zest, irsih spring; Mild soaps such as Cetaphil or Dove sensitive skin, avoid hot or  cold showers, aggressive use of emollients including vanicream, cetaphil or cerave discussed with patient.    Risks of non-melanoma skin cancer discussed with patient   Return to clinic 6 months      Again, thank you for allowing me to participate in the care of your patient.        Sincerely,        Adrian Crawford MD

## 2019-06-13 NOTE — PROGRESS NOTES
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   .  Patient states this has been present for a while.  Patient reports the following symptoms:  crusting.  Patient reports the following previous treatments cryo .  Patient reports the following modifying factors none.  Associated symptoms: none.  Patient has no other skin complaints today.  Remainder of the HPI, Meds, PMH, Allergies, FH, and SH was reviewed in chart.      Past Medical History:   Diagnosis Date     Basal cell carcinoma      Hyperlipidemia      Hypothyroidism      LUMBOSACRAL NEURITIS NOS 4/12/2007     Sleep apnea     does not wear CPAP     Squamous cell carcinoma      Tachycardia        Past Surgical History:   Procedure Laterality Date     ABDOMEN SURGERY  9/2009    gastric bypass 09/2009     APPENDECTOMY  1970    1970     ARTHROPLASTY REVISION KNEE Right 2/4/2016    Procedure: ARTHROPLASTY REVISION KNEE;  Surgeon: Vincent Allen MD;  Location:  OR     COLONOSCOPY  7/14/2014    Procedure: COLONOSCOPY;  Surgeon: Jamari Jj MD;  Location:  GI     ENT SURGERY  1947    tosillectomy      EYE SURGERY  2011    macular repair 2011 left eye     EYE SURGERY  2012    cataract removal left eye.     GYN SURGERY  1978    c section     HERNIA REPAIR  1990    umbilical 1990     ORTHOPEDIC SURGERY  1993, 1995    arthroscopic     ORTHOPEDIC SURGERY  1996    Bilateral TKA     ORTHOPEDIC SURGERY  2008    Left hip replacement.        Family History   Problem Relation Age of Onset     Heart Disease Mother      Skin Cancer Mother      Heart Disease Son      Unknown/Adopted Father      Heart Disease Sister      Skin Cancer Brother      Diabetes No family hx of      Coronary Artery Disease No family hx of      Hypertension No family hx of      Hyperlipidemia No family hx of      Cerebrovascular Disease No family hx of      Breast Cancer No family hx of       Colon Cancer No family hx of      Prostate Cancer No family hx of      Other Cancer No family hx of      Depression No family hx of      Anxiety Disorder No family hx of      Mental Illness No family hx of      Substance Abuse No family hx of      Anesthesia Reaction No family hx of      Asthma No family hx of      Osteoporosis No family hx of      Genetic Disorder No family hx of      Thyroid Disease No family hx of      Obesity No family hx of        Social History     Socioeconomic History     Marital status:      Spouse name: Not on file     Number of children: Not on file     Years of education: Not on file     Highest education level: Not on file   Occupational History     Not on file   Social Needs     Financial resource strain: Not on file     Food insecurity:     Worry: Not on file     Inability: Not on file     Transportation needs:     Medical: Not on file     Non-medical: Not on file   Tobacco Use     Smoking status: Never Smoker     Smokeless tobacco: Never Used   Substance and Sexual Activity     Alcohol use: Yes     Alcohol/week: 0.0 oz     Comment: rare     Drug use: No     Sexual activity: Never     Partners: Male   Lifestyle     Physical activity:     Days per week: Not on file     Minutes per session: Not on file     Stress: Not on file   Relationships     Social connections:     Talks on phone: Not on file     Gets together: Not on file     Attends Yarsani service: Not on file     Active member of club or organization: Not on file     Attends meetings of clubs or organizations: Not on file     Relationship status: Not on file     Intimate partner violence:     Fear of current or ex partner: Not on file     Emotionally abused: Not on file     Physically abused: Not on file     Forced sexual activity: Not on file   Other Topics Concern     Parent/sibling w/ CABG, MI or angioplasty before 65F 55M? No   Social History Narrative     Not on file       Outpatient Encounter Medications as of  6/13/2019   Medication Sig Dispense Refill     Cholecalciferol (VITAMIN D) 1000 UNITS capsule Take 1 capsule by mouth daily        Cyanocobalamin (VITAMIN B 12 PO) Take 2,500 mcg by mouth daily sublingual       ferrous sulfate (FEROSUL) 325 (65 Fe) MG tablet Take 325 mg by mouth daily       levothyroxine (SYNTHROID/LEVOTHROID) 50 MCG tablet TAKE 1 TABLET (50 MCG) BY MOUTH DAILY 90 tablet 3     metoprolol succinate ER (TOPROL-XL) 25 MG 24 hr tablet Take 1 tablet (25 mg) by mouth daily 90 tablet 3     OMEPRAZOLE PO Take 20 mg by mouth daily        Pediatric Multivit-Minerals-C (FLINTSTONES COMPLETE PO) Take 1 tablet by mouth 2 times daily       simvastatin (ZOCOR) 40 MG tablet Take 1 tablet (40 mg) by mouth daily 90 tablet 3     No facility-administered encounter medications on file as of 6/13/2019.              Review Of Systems  Skin: As above  Eyes: negative  Ears/Nose/Throat: negative  Respiratory: No shortness of breath, dyspnea on exertion, cough, or hemoptysis  Cardiovascular: negative  Gastrointestinal: negative  Genitourinary: negative  Musculoskeletal: negative  Neurologic: negative  Psychiatric: negative  Hematologic/Lymphatic/Immunologic: negative  Endocrine: negative      O:   NAD, WDWN, Alert & Oriented, Mood & Affect wnl, Vitals stable   Here today alone   /70   Pulse 71   LMP  (LMP Unknown)   SpO2 97%   Breastfeeding? No    General appearance normal   Vitals stable   Alert, oriented and in no acute distress      Following lymph nodes palpated: Occipital, Cervical, Supraclavicular no lad   R NSW ill-defined 7mm red scaly papule      Eyes: Conjunctivae/lids:Normal     ENT: Lips, buccal mucosa, tongue: normal    MSK:Normal    Cardiovascular: peripheral edema none    Pulm: Breathing Normal    Neuro/Psych: Orientation:Normal; Mood/Affect:Normal      A/P:  1. R NSW squamous cell carcinoma in situ   MOHS:   Ill-defined margins    After PGACAC discussed with patient, decision for Mohs surgery was  made. Indication for Mohs was Ill-defined margins. Patient confirmed the site with Dr. Crawford.  After anesthesia with LEC, the tumor was excised using standard Mohs technique in 1 stages(s).  CLEAR MARGINS OBTAINED and Final defect size was 1.2 x 1.3 cm.       DERMABRASION: After PGACAC discussed with patient, decision for tangential excision and dermabrasion was made. After anesthesia with Lido/Epi/Clinda and prep with hibiclens, hypertrophic areas were tangentially excised and entire cosmetic unit was smoothed 2.2cm. Hemostasis was obtained with pressure. Patient tolerated procedure well. There were no complications and EBL minimal. Patient advised to keep abraded surfaces covered with generous ointment until healed, approximately 2 weeks. Return to office in 3 months or prn.  BENIGN LESIONS DISCUSSED WITH PATIENT:  I discussed the specifics of tumor, prognosis, and genetics of benign lesions.  I explained that treatment of these lesions would be purely cosmetic and not medically neccessary.  I discussed with patient different removal options including excision, cautery and /or laser.      Nature and genetics of benign skin lesions dicussed with patient.  Signs and Symptoms of skin cancer discussed with patient.  Patient encouraged to perform monthly skin exams.  UV precautions reviewed with patient.  Patient to follow up with Primary Care provider regarding elevated blood pressure.  Skin care regimen reviewed with patient: Eliminate harsh soaps, i.e. Dial, zest, irsih spring; Mild soaps such as Cetaphil or Dove sensitive skin, avoid hot or cold showers, aggressive use of emollients including vanicream, cetaphil or cerave discussed with patient.    Risks of non-melanoma skin cancer discussed with patient   Return to clinic 6 months

## 2019-06-13 NOTE — PATIENT INSTRUCTIONS
Wound Care Instructions     FOR SUPERFICIAL WOUNDS     [facility] [phone]    [facility] [phone]                       AFTER 24 HOURS YOU SHOULD REMOVE THE BANDAGE AND BEGIN DAILY DRESSING CHANGES AS FOLLOWS:     1) Remove Dressing.     2) Clean and dry the area with tap water using a Q-tip or sterile gauze pad.     3) Apply Vaseline, Aquaphor, Polysporin ointment or Bacitracin ointment over entire wound.  Do NOT use Neosporin ointment.     4) Cover the wound with a band-aid, or a sterile non-stick gauze pad and micropore paper tape      REPEAT THESE INSTRUCTIONS AT LEAST ONCE A DAY UNTIL THE WOUND HAS COMPLETELY HEALED.    It is an old wives tale that a wound heals better when it is exposed to air and allowed to dry out. The wound will heal faster with a better cosmetic result if it is kept moist with ointment and covered with a bandage.    **Do not let the wound dry out.**      Supplies Needed:      *Cotton tipped applicators (Q-tips)    *Polysporin Ointment or Bacitracin Ointment (NOT NEOSPORIN)    *Band-aids or non-stick gauze pads and micropore paper tape.      PATIENT INFORMATION:    During the healing process you will notice a number of changes. All wounds develop a small halo of redness surrounding the wound.  This means healing is occurring. Severe itching with extensive redness usually indicates sensitivity to the ointment or bandage tape used to dress the wound.  You should call our office if this develops.      Swelling  and/or discoloration around your surgical site is common, particularly when performed around the eye.    All wounds normally drain.  The larger the wound the more drainage there will be.  After 7-10 days, you will notice the wound beginning to shrink and new skin will begin to grow.  The wound is healed when you can see skin has formed over the entire area.  A healed wound has a healthy, shiny look to the surface and is red to dark pink in color  to normalize.  Wounds may take approximately 4-6 weeks to heal.  Larger wounds may take 6-8 weeks.  After the wound is healed you may discontinue dressing changes.    You may experience a sensation of tightness as your wound heals. This is normal and will gradually subside.    Your healed wound may be sensitive to temperature changes. This sensitivity improves with time, but if you re having a lot of discomfort, try to avoid temperature extremes.    Patients frequently experience itching after their wound appears to have healed because of the continue healing under the skin.  Plain Vaseline will help relieve the itching.        POSSIBLE COMPLICATIONS    BLEEDIN. Leave the bandage in place.  2. Use tightly rolled up gauze or a cloth to apply direct pressure over the bandage for 30  minutes.  3. Reapply pressure for an additional 30 minutes if necessary  4. Use additional gauze and tape to maintain pressure once the bleeding has stopped.

## 2019-06-17 ENCOUNTER — TRANSFERRED RECORDS (OUTPATIENT)
Dept: HEALTH INFORMATION MANAGEMENT | Facility: CLINIC | Age: 79
End: 2019-06-17

## 2019-06-18 ENCOUNTER — TELEPHONE (OUTPATIENT)
Dept: FAMILY MEDICINE | Facility: CLINIC | Age: 79
End: 2019-06-18

## 2019-06-18 NOTE — TELEPHONE ENCOUNTER
Pt does not need referral to go to OBGYN. Sent last two OV's per patient request. Called pt and let her know this was done.

## 2019-06-18 NOTE — TELEPHONE ENCOUNTER
Reason for Call:  Other   Visit [facility] to [facility]    Detailed comments:   Patient spoke to Dr Yoselin Mancera today     She [facility]     They want last two visits per patient.   I do not see a notation of this or a referral    If possible fax visit notes to [phone]   Their phone number is [phone]    If record release is necessary please let patient know    Phone Number Patient can be reached at: Home number on file [phone] (home)    Best Time: anytime    Can we leave a detailed message on this number? YES    Call taken on 6/18/2019 at 3:15 PM by URIEL GUILLAUME

## 2019-11-05 ENCOUNTER — OFFICE VISIT (OUTPATIENT)
Dept: FAMILY MEDICINE | Facility: CLINIC | Age: 79
End: 2019-11-05
Payer: MEDICARE

## 2019-11-05 VITALS
TEMPERATURE: 97 F | WEIGHT: 200 LBS | RESPIRATION RATE: 18 BRPM | DIASTOLIC BLOOD PRESSURE: 70 MMHG | BODY MASS INDEX: 32.14 KG/M2 | SYSTOLIC BLOOD PRESSURE: 120 MMHG | HEART RATE: 61 BPM | HEIGHT: 66 IN | OXYGEN SATURATION: 99 %

## 2019-11-05 DIAGNOSIS — D62 ANEMIA DUE TO BLOOD LOSS, ACUTE: ICD-10-CM

## 2019-11-05 DIAGNOSIS — I10 BENIGN ESSENTIAL HYPERTENSION: ICD-10-CM

## 2019-11-05 DIAGNOSIS — R73.02 GLUCOSE INTOLERANCE (IMPAIRED GLUCOSE TOLERANCE): ICD-10-CM

## 2019-11-05 DIAGNOSIS — E03.4 HYPOTHYROIDISM DUE TO ACQUIRED ATROPHY OF THYROID: ICD-10-CM

## 2019-11-05 DIAGNOSIS — E66.811 CLASS 1 OBESITY DUE TO EXCESS CALORIES WITH SERIOUS COMORBIDITY AND BODY MASS INDEX (BMI) OF 32.0 TO 32.9 IN ADULT: ICD-10-CM

## 2019-11-05 DIAGNOSIS — Z00.00 ENCOUNTER FOR MEDICARE ANNUAL WELLNESS EXAM: Primary | ICD-10-CM

## 2019-11-05 DIAGNOSIS — E66.09 CLASS 1 OBESITY DUE TO EXCESS CALORIES WITH SERIOUS COMORBIDITY AND BODY MASS INDEX (BMI) OF 32.0 TO 32.9 IN ADULT: ICD-10-CM

## 2019-11-05 DIAGNOSIS — E78.00 HYPERCHOLESTEROLEMIA: ICD-10-CM

## 2019-11-05 LAB
HBA1C MFR BLD: 5.8 % (ref 0–5.6)
HGB BLD-MCNC: 13.7 G/DL (ref 11.7–15.7)

## 2019-11-05 PROCEDURE — 36415 COLL VENOUS BLD VENIPUNCTURE: CPT | Performed by: FAMILY MEDICINE

## 2019-11-05 PROCEDURE — G0439 PPPS, SUBSEQ VISIT: HCPCS | Performed by: FAMILY MEDICINE

## 2019-11-05 PROCEDURE — 85018 HEMOGLOBIN: CPT | Performed by: FAMILY MEDICINE

## 2019-11-05 PROCEDURE — 83036 HEMOGLOBIN GLYCOSYLATED A1C: CPT | Performed by: FAMILY MEDICINE

## 2019-11-05 ASSESSMENT — MIFFLIN-ST. JEOR: SCORE: 1401

## 2019-11-05 NOTE — NURSING NOTE
"Chief Complaint   Patient presents with     Recheck Medication     /70   Pulse 61   Temp 97  F (36.1  C) (Tympanic)   Resp 18   Ht 1.664 m (5' 5.5\")   Wt 90.7 kg (200 lb)   LMP  (LMP Unknown)   SpO2 99%   Breastfeeding? No   BMI 32.78 kg/m   Estimated body mass index is 32.78 kg/m  as calculated from the following:    Height as of this encounter: 1.664 m (5' 5.5\").    Weight as of this encounter: 90.7 kg (200 lb).  BP completed using cuff size: regular   Syeda Her CMA    Health Maintenance Due   Topic Date Due     ZOSTER IMMUNIZATION (2 of 3) 11/27/2009     INFLUENZA VACCINE (1) 09/01/2019     MEDICARE ANNUAL WELLNESS VISIT  10/11/2019     Health Maintenance reviewed at today's visit patient asked to schedule/complete:   Routine Health Visit: Patient agrees to schedule    "

## 2019-11-05 NOTE — LETTER
November 11, 2019      Aurora Figueroa  [address] [address]        Dear ,    We are writing to inform you of your test results.    Please see attached lab results   They are all normal     THE FOLLOWING ARE EXPLANATIONS OF SOME OF OUR LAB TESTS     YOU DID NOT NECESSARILY HAVE ALL OF THESE DONE     Hgb is the blood iron level   WBC means White Blood Cells   Platelets are small blood    cells that help with forming the blood clots along with other blood factors.   Electrolytes are Sodium, Potassium, Calcium, Magnesium, Phosphorus.   Liver tests are: AST, ALT, Bilirubin, Alkaline Phosphatase.   Kidney tests are Creatinine, GFR.   HDL Choles   terol - is the good cholesterol and it is good to have it high.   LDL cholesterol is the bad cholesterol and it is good to have it low.   It is recommended to have LDL less than 130 for people with hypertension and to have it less than 100 for people with   heart disease, diabetes and chronic kidney disease.   Triglycerides are another type of lipid that can cause heart disease, like the cholesterol and should be kept low   Thyroid tests are TSH, T4, T3   Glucose is sugar.   A1c is a test that gives us an idea    about how well was controlled the diabetes for the last 3 months.   PSA stands for Prostate Specific Antigen and it can be elevated with prostate cancer or prostate inflammation.     Please continue on the same medications     Resulted Orders   Hemoglobin A1c   Result Value Ref Range    Hemoglobin A1C 5.8 (H) 0 - 5.6 %      Comment:      Normal <5.7% Prediabetes 5.7-6.4%  Diabetes 6.5% or higher - adopted from ADA   consensus guidelines.     Hemoglobin   Result Value Ref Range    Hemoglobin 13.7 11.7 - 15.7 g/dL       If you have any questions or concerns, please call the clinic at the number listed above.       Sincerely,        Yoselin Mancera MD

## 2019-11-05 NOTE — PATIENT INSTRUCTIONS
1. The only way known to prevent diabetes or keep it from getting worse is exercise, 20-40 minutes 3 times a day around the time of meals as your insulin is wearing out  You need to get rid of the sugar using your muscles     2.  Weight Loss Tips  1. Do not eat after 6 hrs before your expected bedtime  2. Have your heaviest meal for breakfast, a slightly lighter meal at lunch and a snack 6 hrs before bed  3. No sugar/calorie drinks except milk ie no fruit juice, pop, alcohol.  4. Drink milk 30min before meals to decrease your hunger. Also it is excellent as part of your last meal of the day snack  5. Drink lots of water  6. Increase fiber in diet: all bran cereal, salads, popcorn etc  7. Have only one small serving of fruit a day about 1/2 cup (as this is high in sugar)  8. EXERCISE is the bottom line. Without it, you will gain weight even on a low calorie diet. Best if done 2-3X a day as can    Being overweight contributes to high blood pressure and high cholesterol, both of which cause heart attacks, strokes and kidney failure, prediabetes and diabetes, arthritis, and liver disease     3. Shingrex is a 2 shot series that prevents shingles 97% of the time, as opposed to the old shingles shot that only prevented it at 40-50%  It costs less for medicare at a pharmacy  You should get it starting at 50 yrs old get the 2nd shot 5-6 mo after the first one    4. Use Debrox or cerumenex Put one to 2  Drops of this in  Each ear one at a time and hold with that ear up  For 15-30min until you get relief  NO Q tips in the ear !! Nothing smaller than your elbow in your ear You may use the shower or a sink faucet to run warm water in the ear to clean it out    Patient Education   Personalized Prevention Plan  You are due for the preventive services outlined below.  Your care team is available to assist you in scheduling these services.  If you have already completed any of these items, please share that information with your  care team to update in your medical record.  Health Maintenance Due   Topic Date Due     Zoster (Shingles) Vaccine (2 of 3) 11/27/2009     Flu Vaccine (1) 09/01/2019     Annual Wellness Visit  10/11/2019     Preventive Health Recommendations    See your health care provider every year to    Review health changes.     Discuss preventive care.      Review your medicines if your doctor has prescribed any.    You no longer need a yearly Pap test unless you've had an abnormal Pap test in the past 10 years. If you have vaginal symptoms, such as bleeding or discharge, be sure to talk with your provider about a Pap test.    Every 1 to 2 years, have a mammogram.  If you are over 69, talk with your health care provider about whether or not you want to continue having screening mammograms.    Every 10 years, have a colonoscopy. Or, have a yearly FIT test (stool test). These exams will check for colon cancer.     Have a cholesterol test every 5 years, or more often if your doctor advises it.     Have a diabetes test (fasting glucose) every three years. If you are at risk for diabetes, you should have this test more often.     At age 65, have a bone density scan (DEXA) to check for osteoporosis (brittle bone disease).    Shots:    Get a flu shot each year.    Get a tetanus shot every 10 years.    Talk to your doctor about your pneumonia vaccines. There are now two you should receive - Pneumovax (PPSV 23) and Prevnar (PCV 13).    Talk to your pharmacist about the shingles vaccine.    Talk to your doctor about the hepatitis B vaccine.    Nutrition:     Eat at least 5 servings of fruits and vegetables each day.    Eat whole-grain bread, whole-wheat pasta and brown rice instead of white grains and rice.    Get adequate Calcium and Vitamin D.     Lifestyle    Exercise at least 150 minutes a week (30 minutes a day, 5 days a week). This will help you control your weight and prevent disease.    Limit alcohol to one drink per day.    No  smoking.     Wear sunscreen to prevent skin cancer.     See your dentist twice a year for an exam and cleaning.    See your eye doctor every 1 to 2 years to screen for conditions such as glaucoma, macular degeneration and cataracts.    Personalized Prevention Plan  You are due for the preventive services outlined below.  Your care team is available to assist you in scheduling these services.  If you have already completed any of these items, please share that information with your care team to update in your medical record.  Health Maintenance Due   Topic Date Due     Zoster (Shingles) Vaccine (2 of 3) 11/27/2009     Flu Vaccine (1) 09/01/2019     Annual Wellness Visit  10/11/2019

## 2019-11-05 NOTE — PROGRESS NOTES
"  SUBJECTIVE:   Aurora Figueroa is a 79 year old female who presents for Preventive Visit.    Are you in the first 12 months of your Medicare Part B coverage?  No    Physical Health:    In general, how would you rate your overall physical health? good    Outside of work, how many days during the week do you exercise? none    Outside of work, approximately how many minutes a day do you exercise?not applicable    If you drink alcohol do you typically have >3 drinks per day or >7 drinks per week? No    Do you usually eat at least 4 servings of fruit and vegetables a day, include whole grains & fiber and avoid regularly eating high fat or \"junk\" foods? Yes    Do you have any problems taking medications regularly?  No    Do you have any side effects from medications? not applicable    Needs assistance for the following daily activities: no assistance needed    Which of the following safety concerns are present in your home?  none identified     Hearing impairment: No    In the past 6 months, have you been bothered by leaking of urine? no    Mental Health:    In general, how would you rate your overall mental or emotional health? good  PHQ-2 Score: 0    Do you feel safe in your environment? Yes    Have you ever done Advance Care Planning? (For example, a Health Directive, POLST, or a discussion with a medical provider or your loved ones about your wishes): No, advance care planning information given to patient to review.  Patient declined advance care planning discussion at this time.    Additional concerns to address?  No    Fall risk:     Cognitive Screenin) Repeat 3 items (Leader, Season, Table)    2) Clock draw: NORMAL  3) 3 item recall: Recalls 3 objects  Results: 3 items recalled: COGNITIVE IMPAIRMENT LESS LIKELY    Mini-CogTM Copyright MALENA Diaz. Licensed by the author for use in [facility]; reprinted with permission (cary@.[email or web address]). All rights reserved.      Do you have sleep apnea, " excessive snoring or daytime drowsiness?: no    Glucose Intolerance Follow-up       Patient is checking blood sugars: not at all    HgbA1C = 5.9 in 5-19    Diabetic concerns: None     Symptoms of hypoglycemia (low blood sugar): none     Paresthesias (numbness or burning in feet) or sores: No     Date of last diabetic eye exam: 2016    Hyperlipidemia:LDL Follow-up      Rate your low fat/cholesterol diet?: good    Taking statin?  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 No   Are you following a low salt diet? No  Are your blood pressures ever more than 140 on the top number (systolic) OR more   than 90 on the bottom number (diastolic), for example 140/90? No    Hypothyroidism Follow-up    Since last visit, patient describes the following symptoms: Weight stable, no hair loss, no skin changes, no constipation, no loose stools  50mcg.levo  wnl TSH                  How many servings of fruits and vegetables do you eat daily?  2-3    On average, how many sweetened beverages do you drink each day (soda, juice, sweet tea, etc)?   0    How many days per week do you miss taking your medication? 0    Reviewed and updated as needed this visit by clinical staff  Tobacco  Allergies  Meds  Problems  Med Hx  Surg Hx  Fam Hx  Soc Hx        Reviewed and updated as needed this visit by  Provider  Tobacco  Allergies  Meds  Problems  Med Hx  Surg Hx  Fam Hx        Social History     Tobacco Use     Smoking status: Never Smoker     Smokeless tobacco: Never Used   Substance Use Topics     Alcohol use: Yes     Alcohol/week: 0.0 standard drinks     Comment: rare                           Current providers sharing in care for this patient include:   Patient Care Team:  Yoselin Mancera MD as PCP - General (Family Practice)  Yoselin Mancera MD as Assigned PCP    The following health maintenance items are reviewed in Epic and correct as of today:  Health Maintenance   Topic Date Due     ZOSTER IMMUNIZATION (2 of 3) 11/27/2009     INFLUENZA VACCINE (1) 09/01/2019     TSH W/FREE T4 REFLEX  05/08/2020     LIPID  06/07/2020     FALL RISK ASSESSMENT  06/07/2020     MEDICARE ANNUAL WELLNESS VISIT  11/05/2020     ADVANCE CARE PLANNING  11/05/2024     DTAP/TDAP/TD IMMUNIZATION (3 - Td) 07/30/2028     DEXA  Completed     PHQ-2  Completed     PNEUMOCOCCAL IMMUNIZATION 65+ LOW/MEDIUM RISK  Completed     IPV IMMUNIZATION  Aged Out     MENINGITIS IMMUNIZATION  Aged Out     Labs reviewed in EPIC      ROS:  CONSTITUTIONAL: NEGATIVE for fever, chills, change in weight  INTEGUMENTARY/SKIN: NEGATIVE for worrisome rashes, moles or lesions  EYES: NEGATIVE for vision changes or irritation  ENT/MOUTH: NEGATIVE for ear, mouth and throat problems  RESP: NEGATIVE for significant cough or SOB  BREAST: NEGATIVE for masses, tenderness or discharge  CV: NEGATIVE for chest pain, palpitations or peripheral edema  GI: NEGATIVE for nausea, abdominal pain, heartburn, or change in bowel habits  : NEGATIVE for frequency, dysuria, or hematuria  MUSCULOSKELETAL: NEGATIVE for significant arthralgias or myalgia  NEURO: NEGATIVE for weakness, dizziness or paresthesias  ENDOCRINE: NEGATIVE for temperature intolerance, skin/hair changes  HEME: NEGATIVE for bleeding problems  PSYCHIATRIC: NEGATIVE for changes in mood  "or affect    OBJECTIVE:   /70   Pulse 61   Temp 97  F (36.1  C) (Tympanic)   Resp 18   Ht 1.664 m (5' 5.5\")   Wt 90.7 kg (200 lb)   LMP  (LMP Unknown)   SpO2 99%   Breastfeeding? No   BMI 32.78 kg/m   Estimated body mass index is 32.78 kg/m  as calculated from the following:    Height as of this encounter: 1.664 m (5' 5.5\").    Weight as of this encounter: 90.7 kg (200 lb).  EXAM:   GENERAL APPEARANCE: healthy, alert, no distress and obese  EYES: Eyes grossly normal to inspection, PERRL and conjunctivae and sclerae normal  NECK: no adenopathy, no asymmetry, masses, or scars and thyroid normal to palpation  RESP: lungs clear to auscultation - no rales, rhonchi or wheezes  BREAST: normal without masses, tenderness or nipple discharge and no palpable axillary masses or adenopathy  CV: regular rate and rhythm, normal S1 S2, no S3 or S4, no murmur, click or rub, no peripheral edema and peripheral pulses strong  ABDOMEN: soft, nontender, no hepatosplenomegaly, no masses and bowel sounds normal  MS: no musculoskeletal defects are noted and gait is age appropriate without ataxia  SKIN: no suspicious lesions or rashes  NEURO: Normal strength and tone, sensory exam grossly normal, mentation intact and speech normal  PSYCH: mentation appears normal and affect normal/bright   LYMPH: Negative CCOA nodes and thyroid and inguinal nodes       Diagnostic Test Results:  Labs reviewed in Epic  Results for orders placed or performed in visit on 11/05/19   Hemoglobin A1c     Status: Abnormal   Result Value Ref Range    Hemoglobin A1C 5.8 (H) 0 - 5.6 %   Hemoglobin     Status: None   Result Value Ref Range    Hemoglobin 13.7 11.7 - 15.7 g/dL       ASSESSMENT / PLAN:       ICD-10-CM    1. Encounter for Medicare annual wellness exam Z00.00    2. Anemia due to blood loss, acute D62 Hemoglobin   3. Benign essential hypertension I10    4. Glucose intolerance (impaired glucose tolerance) R73.02 Hemoglobin A1c   5. Hypothyroidism " "due to acquired atrophy of thyroid E03.4    6. Hypercholesterolemia E78.00    7. Class 1 obesity due to excess calories with serious comorbidity and body mass index (BMI) of 32.0 to 32.9 in adult E66.09     Z68.32        COUNSELING:  Reviewed preventive health counseling, as reflected in patient instructions       Regular exercise       Healthy diet/nutrition       Vision screening    Estimated body mass index is 32.78 kg/m  as calculated from the following:    Height as of this encounter: 1.664 m (5' 5.5\").    Weight as of this encounter: 90.7 kg (200 lb).    Weight management plan: Discussed healthy diet and exercise guidelines     reports that she has never smoked. She has never used smokeless tobacco.      Appropriate preventive services were discussed with this patient, including applicable screening as appropriate for cardiovascular disease, diabetes, osteopenia/osteoporosis, and glaucoma.  As appropriate for age/gender, discussed screening for colorectal cancer, prostate cancer, breast cancer, and cervical cancer. Checklist reviewing preventive services available has been given to the patient.    Reviewed patients plan of care and provided an AVS. The Basic Care Plan (routine screening as documented in Health Maintenance) for Aurora meets the Care Plan requirement. This Care Plan has been established and reviewed with the Patient.    Patient Instructions     1. The only way known to prevent diabetes or keep it from getting worse is exercise, 20-40 minutes 3 times a day around the time of meals as your insulin is wearing out  You need to get rid of the sugar using your muscles     2.  Weight Loss Tips  1. Do not eat after 6 hrs before your expected bedtime  2. Have your heaviest meal for breakfast, a slightly lighter meal at lunch and a snack 6 hrs before bed  3. No sugar/calorie drinks except milk ie no fruit juice, pop, alcohol.  4. Drink milk 30min before meals to decrease your hunger. Also it is " excellent as part of your last meal of the day snack  5. Drink lots of water  6. Increase fiber in diet: all bran cereal, salads, popcorn etc  7. Have only one small serving of fruit a day about 1/2 cup (as this is high in sugar)  8. EXERCISE is the bottom line. Without it, you will gain weight even on a low calorie diet. Best if done 2-3X a day as can    Being overweight contributes to high blood pressure and high cholesterol, both of which cause heart attacks, strokes and kidney failure, prediabetes and diabetes, arthritis, and liver disease     3. Shingrex is a 2 shot series that prevents shingles 97% of the time, as opposed to the old shingles shot that only prevented it at 40-50%  It costs less for medicare at a pharmacy  You should get it starting at 50 yrs old get the 2nd shot 5-6 mo after the first one    4. Use Debrox or cerumenex Put one to 2  Drops of this in  Each ear one at a time and hold with that ear up  For 15-30min until you get relief  NO Q tips in the ear !! Nothing smaller than your elbow in your ear You may use the shower or a sink faucet to run warm water in the ear to clean it out    Patient Education   Personalized Prevention Plan  You are due for the preventive services outlined below.  Your care team is available to assist you in scheduling these services.  If you have already completed any of these items, please share that information with your care team to update in your medical record.  Health Maintenance Due   Topic Date Due     Zoster (Shingles) Vaccine (2 of 3) 11/27/2009     Flu Vaccine (1) 09/01/2019     Annual Wellness Visit  10/11/2019     Preventive Health Recommendations    See your health care provider every year to    Review health changes.     Discuss preventive care.      Review your medicines if your doctor has prescribed any.    You no longer need a yearly Pap test unless you've had an abnormal Pap test in the past 10 years. If you have vaginal symptoms, such as bleeding  or discharge, be sure to talk with your provider about a Pap test.    Every 1 to 2 years, have a mammogram.  If you are over 69, talk with your health care provider about whether or not you want to continue having screening mammograms.    Every 10 years, have a colonoscopy. Or, have a yearly FIT test (stool test). These exams will check for colon cancer.     Have a cholesterol test every 5 years, or more often if your doctor advises it.     Have a diabetes test (fasting glucose) every three years. If you are at risk for diabetes, you should have this test more often.     At age 65, have a bone density scan (DEXA) to check for osteoporosis (brittle bone disease).    Shots:    Get a flu shot each year.    Get a tetanus shot every 10 years.    Talk to your doctor about your pneumonia vaccines. There are now two you should receive - Pneumovax (PPSV 23) and Prevnar (PCV 13).    Talk to your pharmacist about the shingles vaccine.    Talk to your doctor about the hepatitis B vaccine.    Nutrition:     Eat at least 5 servings of fruits and vegetables each day.    Eat whole-grain bread, whole-wheat pasta and brown rice instead of white grains and rice.    Get adequate Calcium and Vitamin D.     Lifestyle    Exercise at least 150 minutes a week (30 minutes a day, 5 days a week). This will help you control your weight and prevent disease.    Limit alcohol to one drink per day.    No smoking.     Wear sunscreen to prevent skin cancer.     See your dentist twice a year for an exam and cleaning.    See your eye doctor every 1 to 2 years to screen for conditions such as glaucoma, macular degeneration and cataracts.    Personalized Prevention Plan  You are due for the preventive services outlined below.  Your care team is available to assist you in scheduling these services.  If you have already completed any of these items, please share that information with your care team to update in your medical record.  Health Maintenance Due    Topic Date Due     Zoster (Shingles) Vaccine (2 of 3) 11/27/2009     Flu Vaccine (1) 09/01/2019     Annual Wellness Visit  10/11/2019          Counseling Resources:  ATP IV Guidelines  Pooled Cohorts Equation Calculator  Breast Cancer Risk Calculator  FRAX Risk Assessment  ICSI Preventive Guidelines  Dietary Guidelines for Americans, 2010  USDA's MyPlate  ASA Prophylaxis  Lung CA Screening    Yoselin Mancera MD  [facility]

## 2019-11-12 ENCOUNTER — OFFICE VISIT (OUTPATIENT)
Dept: DERMATOLOGY | Facility: CLINIC | Age: 79
End: 2019-11-12
Payer: MEDICARE

## 2019-11-12 VITALS — DIASTOLIC BLOOD PRESSURE: 78 MMHG | SYSTOLIC BLOOD PRESSURE: 126 MMHG | OXYGEN SATURATION: 97 % | HEART RATE: 58 BPM

## 2019-11-12 DIAGNOSIS — L82.1 SEBORRHEIC KERATOSIS: ICD-10-CM

## 2019-11-12 DIAGNOSIS — L81.4 LENTIGO: ICD-10-CM

## 2019-11-12 DIAGNOSIS — D18.01 ANGIOMA OF SKIN: ICD-10-CM

## 2019-11-12 DIAGNOSIS — D48.5 NEOPLASM OF UNCERTAIN BEHAVIOR OF SKIN: Primary | ICD-10-CM

## 2019-11-12 DIAGNOSIS — L57.0 AK (ACTINIC KERATOSIS): ICD-10-CM

## 2019-11-12 DIAGNOSIS — D22.9 NEVUS: ICD-10-CM

## 2019-11-12 PROCEDURE — 17003 DESTRUCT PREMALG LES 2-14: CPT | Mod: 59 | Performed by: PHYSICIAN ASSISTANT

## 2019-11-12 PROCEDURE — 99214 OFFICE O/P EST MOD 30 MIN: CPT | Mod: 25 | Performed by: PHYSICIAN ASSISTANT

## 2019-11-12 PROCEDURE — 11102 TANGNTL BX SKIN SINGLE LES: CPT | Performed by: PHYSICIAN ASSISTANT

## 2019-11-12 PROCEDURE — 88305 TISSUE EXAM BY PATHOLOGIST: CPT | Mod: TC | Performed by: PHYSICIAN ASSISTANT

## 2019-11-12 PROCEDURE — 17000 DESTRUCT PREMALG LESION: CPT | Mod: 59 | Performed by: PHYSICIAN ASSISTANT

## 2019-11-12 NOTE — LETTER
"    11/12/2019         RE: Aurora Figueroa  [address] [id number]        Dear Colleague,    Thank you for referring your patient, Aurora Figueroa, to the [facility]. Please see a copy of my visit note below.    HPI:   chief complaint: Aurora Figueroa is a 79 year old female who presents for Full skin cancer screening to rule out skin cancer.  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 Travels to [place] to visit family.  On review of systems, there are no further skin complaints, patient is feeling otherwise well.  See patient intake sheet.  ROS of the following were done and are negative: Constitutional, Eyes, Ears, Nose,   Mouth, Throat, Cardiovascular, Respiratory, GI, Genitourinary, Musculoskeletal,   Psychiatric, Endocrine, Allergic/Immunologic.    HPI, past medical history, social history, allergies, medications reviewed as of November 12, 2019     This document serves as a record of the services and decisions personally performed and made by Marely Aaron, MS, PA-C. It was created on her behalf by Leanna Perry, a trained medical scribe. The creation of this document is based on the provider's statements to the medical scribe.  Leanna Perry 2:48 PM November 12, 2019    PHYSICAL EXAM:   /78   Pulse 58   LMP  (LMP Unknown)   SpO2 97%   Breastfeeding? No   Skin exam performed as follows: Type 2 skin. Mood appropriate  Alert and Oriented X 3. Well developed, well nourished in no distress.  General " appearance: Normal  Head including face: Normal  Eyes: conjunctiva and lids: Normal  Mouth: Lips, teeth, gums: Normal  Neck: Normal  Chest-breast/axillae: Normal  Back: Normal  Spleen and liver: Normal  Cardiovascular: Exam of peripheral vascular system by observation for swelling, varicosities, edema: Normal  Genitalia: groin, buttocks: Normal  Extremities: digits/nails (clubbing): Normal  Eccrine and Apocrine glands: Normal  Right upper extremity: Normal  Left upper extremity: Normal  Right lower extremity: Normal  Left lower extremity: Normal  Skin: Scalp and body hair: See below    Pt deferred exam of breasts, groin, buttocks: No    Other physical findings:  1. Multiple pigmented macules on extremities and trunk  2. Multiple pigmented macules on face, trunk and extremities  3. Multiple vascular papules on trunk, arms and legs  4. Multiple scattered keratotic plaques  5. Pink gritty papule on left nose x 2  6. 5 mm pink scaly papule on right forehead       Except as noted above, no other signs of skin cancer or melanoma.     ASSESSMENT/PLAN:   Benign Full skin cancer screening today.     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    Multiple benign appearing nevi on arms, legs and trunk. Discussed ABCDEs of melanoma and sunscreen.   Multiple lentigos on arms, legs and trunk. Advised benign, no treatment needed.  Multiple scattered angiomas. Advised benign, no treatment needed.   Seborrheic keratosis on arms, legs and trunk. Advised benign, no treatment needed.  Actinic keratosis on the left nose x 2. As precancerous, cryosurgery performed. Advised on blistering and post-op care. Advised if not resolved in 1-2 months to return for evaluation  ISK r/o SCC on right forehead. Photo taken and placed in chart. Shave biopsy performed.  Area cleaned and anesthetized with 1% lidocaine with epinephrine.   Dermablade used to remove the lesion and sent to pathology. Bleeding was cauterized. Pt tolerated procedure well with no complications.          Follow-up: pending path/yearly FSE/PRN sooner     1.) Patient was asked about new and changing moles. YES  2.) Patient received a complete physical skin examination: YES  3.) Patient was counseled to perform a monthly self skin examination: YES  Scribed By: Leanna Perry, Medical Scribe    The information in this document, created by the medical scribe for me, accurately reflects the services I personally performed and the decisions made by me. I have reviewed and approved this document for accuracy prior to leaving the patient care area.  November 12, 2019 2:53 PM    Marely Aaron, MS, PACOLLEEN      Again, thank you for allowing me to participate in the care of your patient.        Sincerely,        Marely Aaron PA-C

## 2019-11-12 NOTE — PATIENT INSTRUCTIONS
Wound Care Instructions     FOR SUPERFICIAL WOUNDS     [facility] [phone]    [facility] [phone]                       AFTER 24 HOURS YOU SHOULD REMOVE THE BANDAGE AND BEGIN DAILY DRESSING CHANGES AS FOLLOWS:     1) Remove Dressing.     2) Clean and dry the area with tap water using a Q-tip or sterile gauze pad.     3) Apply Vaseline, Aquaphor, Polysporin ointment or Bacitracin ointment over entire wound.  Do NOT use Neosporin ointment.     4) Cover the wound with a band-aid, or a sterile non-stick gauze pad and micropore paper tape      REPEAT THESE INSTRUCTIONS AT LEAST ONCE A DAY UNTIL THE WOUND HAS COMPLETELY HEALED.    It is an old wives tale that a wound heals better when it is exposed to air and allowed to dry out. The wound will heal faster with a better cosmetic result if it is kept moist with ointment and covered with a bandage.    **Do not let the wound dry out.**      Supplies Needed:      *Cotton tipped applicators (Q-tips)    *Polysporin Ointment or Bacitracin Ointment (NOT NEOSPORIN)    *Band-aids or non-stick gauze pads and micropore paper tape.      PATIENT INFORMATION:    During the healing process you will notice a number of changes. All wounds develop a small halo of redness surrounding the wound.  This means healing is occurring. Severe itching with extensive redness usually indicates sensitivity to the ointment or bandage tape used to dress the wound.  You should call our office if this develops.      Swelling  and/or discoloration around your surgical site is common, particularly when performed around the eye.    All wounds normally drain.  The larger the wound the more drainage there will be.  After 7-10 days, you will notice the wound beginning to shrink and new skin will begin to grow.  The wound is healed when you can see skin has formed over the entire area.  A healed wound has a healthy, shiny look to the surface and is red to dark pink in color  to normalize.  Wounds may take approximately 4-6 weeks to heal.  Larger wounds may take 6-8 weeks.  After the wound is healed you may discontinue dressing changes.    You may experience a sensation of tightness as your wound heals. This is normal and will gradually subside.    Your healed wound may be sensitive to temperature changes. This sensitivity improves with time, but if you re having a lot of discomfort, try to avoid temperature extremes.    Patients frequently experience itching after their wound appears to have healed because of the continue healing under the skin.  Plain Vaseline will help relieve the itching.        POSSIBLE COMPLICATIONS    BLEEDIN. Leave the bandage in place.  2. Use tightly rolled up gauze or a cloth to apply direct pressure over the bandage for 30  minutes.  3. Reapply pressure for an additional 30 minutes if necessary  4. Use additional gauze and tape to maintain pressure once the bleeding has stopped.

## 2019-11-12 NOTE — PROGRESS NOTES
"HPI:   chief complaint: Aurora Figueroa is a 79 year old female who presents for Full skin cancer screening to rule out skin cancer.  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 Travels to [place] to visit family.  On review of systems, there are no further skin complaints, patient is feeling otherwise well.  See patient intake sheet.  ROS of the following were done and are negative: Constitutional, Eyes, Ears, Nose,   Mouth, Throat, Cardiovascular, Respiratory, GI, Genitourinary, Musculoskeletal,   Psychiatric, Endocrine, Allergic/Immunologic.    HPI, past medical history, social history, allergies, medications reviewed as of November 12, 2019     This document serves as a record of the services and decisions personally performed and made by Marely Aaron, MS, PA-C. It was created on her behalf by Leanna Perry, a trained medical scribe. The creation of this document is based on the provider's statements to the medical scribe.  Leanna Perry 2:48 PM November 12, 2019    PHYSICAL EXAM:   /78   Pulse 58   LMP  (LMP Unknown)   SpO2 97%   Breastfeeding? No   Skin exam performed as follows: Type 2 skin. Mood appropriate  Alert and Oriented X 3. Well developed, well nourished in no distress.  General appearance: Normal  Head including face: Normal  Eyes: conjunctiva and lids: Normal  Mouth: Lips, teeth, gums: Normal  Neck: Normal  Chest-breast/axillae: Normal  Back: Normal  Spleen and liver: Normal  Cardiovascular: Exam of peripheral vascular system by observation for " swelling, varicosities, edema: Normal  Genitalia: groin, buttocks: Normal  Extremities: digits/nails (clubbing): Normal  Eccrine and Apocrine glands: Normal  Right upper extremity: Normal  Left upper extremity: Normal  Right lower extremity: Normal  Left lower extremity: Normal  Skin: Scalp and body hair: See below    Pt deferred exam of breasts, groin, buttocks: No    Other physical findings:  1. Multiple pigmented macules on extremities and trunk  2. Multiple pigmented macules on face, trunk and extremities  3. Multiple vascular papules on trunk, arms and legs  4. Multiple scattered keratotic plaques  5. Pink gritty papule on left nose x 2  6. 5 mm pink scaly papule on right forehead       Except as noted above, no other signs of skin cancer or melanoma.     ASSESSMENT/PLAN:   Benign Full skin cancer screening today.     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    Multiple benign appearing nevi on arms, legs and trunk. Discussed ABCDEs of melanoma and sunscreen.   Multiple lentigos on arms, legs and trunk. Advised benign, no treatment needed.  Multiple scattered angiomas. Advised benign, no treatment needed.   Seborrheic keratosis on arms, legs and trunk. Advised benign, no treatment needed.  Actinic keratosis on the left nose x 2. As precancerous, cryosurgery performed. Advised on blistering and post-op care. Advised if not resolved in 1-2 months to return for evaluation  ISK r/o SCC on right forehead. Photo taken and placed in chart. Shave biopsy performed.  Area cleaned and anesthetized with 1% lidocaine with epinephrine.  Dermablade used to remove the lesion and sent to pathology. Bleeding was cauterized. Pt tolerated procedure well with no complications.          Follow-up: pending path/yearly FSE/PRN sooner     1.) Patient was asked about new and changing moles. YES  2.) Patient received a  complete physical skin examination: YES  3.) Patient was counseled to perform a monthly self skin examination: YES  Scribed By: Leanna Perry, Medical Scribe    The information in this document, created by the medical scribe for me, accurately reflects the services I personally performed and the decisions made by me. I have reviewed and approved this document for accuracy prior to leaving the patient care area.  November 12, 2019 2:53 PM    Marely Aaron MS, PA-C

## 2019-11-15 ENCOUNTER — TELEPHONE (OUTPATIENT)
Dept: DERMATOLOGY | Facility: CLINIC | Age: 79
End: 2019-11-15

## 2019-11-15 LAB — COPATH REPORT: NORMAL

## 2019-11-15 NOTE — TELEPHONE ENCOUNTER
Called and spoke to patient.    Educated patient on biopsy results- benign pathology results.     No further treatment needed, recommended regular skin exams.    Patient voiced understanding.    Adonis RN-BSN-PHN  [facility]  [phone]

## 2019-11-15 NOTE — TELEPHONE ENCOUNTER
----- Message from Marely Aaron PA-C sent at 11/15/2019 10:23 AM CST -----  Please notify patient of benign pathology results. No further treatment is needed. Recommend regular skin exams.

## 2020-02-03 ENCOUNTER — OFFICE VISIT (OUTPATIENT)
Dept: FAMILY MEDICINE | Facility: CLINIC | Age: 80
End: 2020-02-03
Payer: MEDICARE

## 2020-02-03 VITALS
HEART RATE: 76 BPM | OXYGEN SATURATION: 96 % | DIASTOLIC BLOOD PRESSURE: 60 MMHG | SYSTOLIC BLOOD PRESSURE: 120 MMHG | TEMPERATURE: 97.7 F | BODY MASS INDEX: 31.66 KG/M2 | WEIGHT: 197 LBS | RESPIRATION RATE: 12 BRPM | HEIGHT: 66 IN

## 2020-02-03 DIAGNOSIS — L30.9 DERMATITIS: Primary | ICD-10-CM

## 2020-02-03 PROCEDURE — 99213 OFFICE O/P EST LOW 20 MIN: CPT | Performed by: FAMILY MEDICINE

## 2020-02-03 RX ORDER — PREDNISONE 10 MG/1
TABLET ORAL
Qty: 10 TABLET | Refills: 0 | Status: SHIPPED | OUTPATIENT
Start: 2020-02-03 | End: 2020-11-06

## 2020-02-03 ASSESSMENT — MIFFLIN-ST. JEOR: SCORE: 1382.4

## 2020-02-03 NOTE — PROGRESS NOTES
"Subjective     Aurora Figueroa is a 80 year old female who presents to clinic today for the following health issues:    HPI     Rash      Duration: 01/31/2020    Description  Location: Anterior Neck  Itching: Severe    Intensity:  Severe    Accompanying signs and symptoms: Redness    History (similar episodes/previous evaluation): None    Precipitating or alleviating factors:  New exposures:  Medication, Cherry Cough Syrup  Recent travel: no      Therapies tried and outcome: hydrocortisone cream -  usually effective    No other new medications or treatments.  Pt has not been using ointments or anything different        Allergies   Allergen Reactions     Food Itching     Raw fruit & veg:  Apples, cherries, cantalope, tomatoes,  carrotts.  Eye & throat irritations.     Penicillins Hives     Fruit [Cherry]      Hives, itchy throat tomatoes     Nsaids Other (See Comments)     Patient had gastric bypass surgery.  Should not take oral  NSAID's ever.     Animal Dander      Pruritis,itchi eyes, throat and ears.     E.E.S. GI Disturbance     Erythromycin GI Disturbance     Pollen Extract Itching         Reviewed and updated as needed this visit by Provider         Review of Systems   ROS COMP: CONSTITUTIONAL: NEGATIVE for fever, chills, change in weight  INTEGUMENTARY/SKIN: POSITIVE for rash neck  ENT/MOUTH: NEGATIVE for ear, mouth and throat problems  RESP:POSITIVE for cough-non productive  CV: NEGATIVE for chest pain, palpitations or peripheral edema      Objective    /60   Pulse 76   Temp 97.7  F (36.5  C) (Tympanic)   Resp 12   Ht 1.664 m (5' 5.5\")   Wt 89.4 kg (197 lb)   LMP  (LMP Unknown)   SpO2 96%   Breastfeeding No   BMI 32.28 kg/m    Body mass index is 32.28 kg/m .  Physical Exam   GENERAL: healthy, alert and no distress  NECK: no adenopathy, no asymmetry, masses, or scars and thyroid normal to palpation  RESP: lungs clear to auscultation - no rales, rhonchi or wheezes  SKIN: erythema - " bilateral neck anterior     Diagnostic Test Results:  Labs reviewed in Epic  none         Assessment & Plan     Aurora was seen today for derm problem.    Diagnoses and all orders for this visit:    Dermatitis  -     predniSONE (DELTASONE) 10 MG tablet; Take 2 daily for 3 days, then 1 daily           FUTURE APPOINTMENTS:       - Follow-up visit in 2 weeks if not resolved  Patient Instructions   Use Zyrtec 10 mg daily for itching if needed  Use baking soda compress to soothe as needed      Return in about 2 weeks (around 2/17/2020), or if symptoms worsen or fail to improve, for dermatitis.    Mikael Warren MD  [facility]

## 2020-02-19 ENCOUNTER — TELEPHONE (OUTPATIENT)
Dept: FAMILY MEDICINE | Facility: CLINIC | Age: 80
End: 2020-02-19

## 2020-02-20 ENCOUNTER — OFFICE VISIT (OUTPATIENT)
Dept: FAMILY MEDICINE | Facility: CLINIC | Age: 80
End: 2020-02-20
Payer: MEDICARE

## 2020-02-20 VITALS
OXYGEN SATURATION: 98 % | DIASTOLIC BLOOD PRESSURE: 60 MMHG | BODY MASS INDEX: 31.66 KG/M2 | HEART RATE: 63 BPM | RESPIRATION RATE: 16 BRPM | WEIGHT: 197 LBS | SYSTOLIC BLOOD PRESSURE: 120 MMHG | HEIGHT: 66 IN | TEMPERATURE: 97 F

## 2020-02-20 DIAGNOSIS — L30.9 DERMATITIS: Primary | ICD-10-CM

## 2020-02-20 PROCEDURE — 99213 OFFICE O/P EST LOW 20 MIN: CPT | Performed by: FAMILY MEDICINE

## 2020-02-20 RX ORDER — TRIAMCINOLONE ACETONIDE 1 MG/G
CREAM TOPICAL 2 TIMES DAILY
Qty: 45 G | Refills: 1 | Status: SHIPPED | OUTPATIENT
Start: 2020-02-20 | End: 2021-01-14

## 2020-02-20 ASSESSMENT — MIFFLIN-ST. JEOR: SCORE: 1382.4

## 2020-02-20 NOTE — PROGRESS NOTES
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  She has been trying not to scratch at it     No new products, soaps or hair products.  Pt has tried some moisturizing lotions but they often sting          BP Readings from Last 3 Encounters:   02/20/20 120/60   02/03/20 120/60   11/12/19 126/78    Wt Readings from Last 3 Encounters:   02/20/20 89.4 kg (197 lb)   02/03/20 89.4 kg (197 lb)   11/05/19 90.7 kg (200 lb)                      Reviewed and updated as needed this visit by Provider         Review of Systems   ROS COMP: CONSTITUTIONAL: NEGATIVE for fever, chills, change in weight  INTEGUMENTARY/SKIN: POSITIVE for pruritis face and rash face  ENT/MOUTH: NEGATIVE for ear, mouth and throat problems  RESP: NEGATIVE for significant cough or SOB      Objective    /60 (Patient Position: Sitting, Cuff Size: Adult Large)   Pulse 63   Temp 97  F (36.1  C) (Tympanic)   Resp 16   Ht 1.664 m (5' 5.5\")   Wt 89.4 kg (197 lb)   LMP  (LMP Unknown)   SpO2 98%   BMI 32.28 kg/m    Body mass index is 32.28 kg/m .  Physical Exam   GENERAL: healthy, alert and no distress  NECK: no adenopathy, no asymmetry, masses, or scars and thyroid normal to palpation  RESP: lungs clear to auscultation - no rales, rhonchi or wheezes  SKIN: no suspicious lesions or rashes    Diagnostic " Test Results:  Labs reviewed in Epic  none         Assessment & Plan     Aurora was seen today for derm problem.    Diagnoses and all orders for this visit:    Dermatitis  -     triamcinolone 0.1 % EX external cream; Apply topically 2 times daily           FUTURE APPOINTMENTS:       - Follow-up visit in 2 weeks if not resolved  Patient Instructions   Use baking soda compress to area as needed to soothe      Return in about 2 weeks (around 3/5/2020), or if symptoms worsen or fail to improve, for dermatitis.    Mikael Warren MD  [facility]

## 2020-05-01 DIAGNOSIS — E03.4 HYPOTHYROIDISM DUE TO ACQUIRED ATROPHY OF THYROID: Chronic | ICD-10-CM

## 2020-05-01 RX ORDER — LEVOTHYROXINE SODIUM 50 UG/1
TABLET ORAL
Qty: 90 TABLET | Refills: 3 | Status: SHIPPED | OUTPATIENT
Start: 2020-05-01 | End: 2020-11-09

## 2020-07-21 DIAGNOSIS — E78.00 HYPERCHOLESTEROLEMIA: ICD-10-CM

## 2020-07-21 DIAGNOSIS — I10 BENIGN ESSENTIAL HYPERTENSION: ICD-10-CM

## 2020-07-21 RX ORDER — METOPROLOL SUCCINATE 25 MG/1
25 TABLET, EXTENDED RELEASE ORAL DAILY
Qty: 90 TABLET | Refills: 1 | Status: SHIPPED | OUTPATIENT
Start: 2020-07-21 | End: 2020-11-06

## 2020-07-21 RX ORDER — SIMVASTATIN 40 MG
40 TABLET ORAL DAILY
Qty: 90 TABLET | Refills: 1 | Status: SHIPPED | OUTPATIENT
Start: 2020-07-21 | End: 2020-11-06

## 2020-10-05 ENCOUNTER — TELEPHONE (OUTPATIENT)
Dept: FAMILY MEDICINE | Facility: CLINIC | Age: 80
End: 2020-10-05

## 2020-10-05 NOTE — TELEPHONE ENCOUNTER
Reason for Call:  Other call back    Detailed comments: The patient called and stated that she is wondering if she is up to date on her pneumonia shot. She would like a call back to discuss this.     Phone Number Patient can be reached at: Home number on file [phone] (home)    Best Time: Any    Can we leave a detailed message on this number? YES    Call taken on 10/5/2020 at 1:53 PM by Sheeba Smith

## 2020-10-06 NOTE — TELEPHONE ENCOUNTER
Patient Contact    Attempt # 1    Was call answered?  No.  Left message on voicemail with information to call the clinic back if there are any further questions.     1. Was told not due for pneumonia shot  2. IS coming due for annual wellness   Summary: menopause    Has been going through menopause for quite a few years but it has been getting worse.  Has seen drs.  Having vaginal odor and itching.  Has went through 3 atb's and started eating yogurt every day.  Hot flashes getting worse. Mari Cruz else can she do? Please advise.

## 2020-10-07 NOTE — TELEPHONE ENCOUNTER
Patient Contact    Attempt # 2    Was call answered?  No.  Unable to leave message.    On call back:    1. Not due for pneumonia shot  2. IS coming due for annual wellness

## 2020-11-09 ENCOUNTER — OFFICE VISIT (OUTPATIENT)
Dept: FAMILY MEDICINE | Facility: CLINIC | Age: 80
End: 2020-11-09
Payer: MEDICARE

## 2020-11-09 VITALS
HEART RATE: 68 BPM | DIASTOLIC BLOOD PRESSURE: 70 MMHG | RESPIRATION RATE: 20 BRPM | HEIGHT: 65 IN | WEIGHT: 193 LBS | SYSTOLIC BLOOD PRESSURE: 128 MMHG | OXYGEN SATURATION: 97 % | BODY MASS INDEX: 32.15 KG/M2 | TEMPERATURE: 97 F

## 2020-11-09 DIAGNOSIS — H61.22 IMPACTED CERUMEN OF LEFT EAR: ICD-10-CM

## 2020-11-09 DIAGNOSIS — E03.4 HYPOTHYROIDISM DUE TO ACQUIRED ATROPHY OF THYROID: ICD-10-CM

## 2020-11-09 DIAGNOSIS — R19.5 ABNORMAL FECES: ICD-10-CM

## 2020-11-09 DIAGNOSIS — I10 BENIGN ESSENTIAL HYPERTENSION: ICD-10-CM

## 2020-11-09 DIAGNOSIS — E78.00 HYPERCHOLESTEROLEMIA: ICD-10-CM

## 2020-11-09 DIAGNOSIS — M16.51 POST-TRAUMATIC OSTEOARTHRITIS OF RIGHT HIP: ICD-10-CM

## 2020-11-09 DIAGNOSIS — N95.1 MENOPAUSAL SYNDROME (HOT FLASHES): ICD-10-CM

## 2020-11-09 DIAGNOSIS — R73.02 GLUCOSE INTOLERANCE (IMPAIRED GLUCOSE TOLERANCE): ICD-10-CM

## 2020-11-09 DIAGNOSIS — Z12.31 VISIT FOR SCREENING MAMMOGRAM: ICD-10-CM

## 2020-11-09 DIAGNOSIS — Z00.00 MEDICARE ANNUAL WELLNESS VISIT, SUBSEQUENT: Primary | ICD-10-CM

## 2020-11-09 LAB
ERYTHROCYTE [DISTWIDTH] IN BLOOD BY AUTOMATED COUNT: 13.6 % (ref 10–15)
HBA1C MFR BLD: 5.8 % (ref 0–5.6)
HCT VFR BLD AUTO: 40.2 % (ref 35–47)
HGB BLD-MCNC: 13.3 G/DL (ref 11.7–15.7)
LIPASE SERPL-CCNC: 148 U/L (ref 73–393)
MCH RBC QN AUTO: 30.9 PG (ref 26.5–33)
MCHC RBC AUTO-ENTMCNC: 33.1 G/DL (ref 31.5–36.5)
MCV RBC AUTO: 93 FL (ref 78–100)
PLATELET # BLD AUTO: 144 10E9/L (ref 150–450)
RBC # BLD AUTO: 4.31 10E12/L (ref 3.8–5.2)
WBC # BLD AUTO: 5.8 10E9/L (ref 4–11)

## 2020-11-09 PROCEDURE — 80061 LIPID PANEL: CPT | Performed by: PHYSICIAN ASSISTANT

## 2020-11-09 PROCEDURE — 85027 COMPLETE CBC AUTOMATED: CPT | Performed by: PHYSICIAN ASSISTANT

## 2020-11-09 PROCEDURE — 69210 REMOVE IMPACTED EAR WAX UNI: CPT | Mod: LT | Performed by: PHYSICIAN ASSISTANT

## 2020-11-09 PROCEDURE — 82150 ASSAY OF AMYLASE: CPT | Performed by: PHYSICIAN ASSISTANT

## 2020-11-09 PROCEDURE — G0439 PPPS, SUBSEQ VISIT: HCPCS | Performed by: PHYSICIAN ASSISTANT

## 2020-11-09 PROCEDURE — 36415 COLL VENOUS BLD VENIPUNCTURE: CPT | Performed by: PHYSICIAN ASSISTANT

## 2020-11-09 PROCEDURE — 84443 ASSAY THYROID STIM HORMONE: CPT | Performed by: PHYSICIAN ASSISTANT

## 2020-11-09 PROCEDURE — 83690 ASSAY OF LIPASE: CPT | Performed by: PHYSICIAN ASSISTANT

## 2020-11-09 PROCEDURE — 80053 COMPREHEN METABOLIC PANEL: CPT | Performed by: PHYSICIAN ASSISTANT

## 2020-11-09 PROCEDURE — 83036 HEMOGLOBIN GLYCOSYLATED A1C: CPT | Performed by: PHYSICIAN ASSISTANT

## 2020-11-09 RX ORDER — METOPROLOL SUCCINATE 25 MG/1
25 TABLET, EXTENDED RELEASE ORAL DAILY
Qty: 90 TABLET | Refills: 3 | Status: SHIPPED | OUTPATIENT
Start: 2020-11-09 | End: 2022-01-14

## 2020-11-09 RX ORDER — LEVOTHYROXINE SODIUM 50 UG/1
TABLET ORAL
Qty: 90 TABLET | Refills: 3 | Status: SHIPPED | OUTPATIENT
Start: 2020-11-09 | End: 2022-01-14

## 2020-11-09 RX ORDER — SIMVASTATIN 40 MG
40 TABLET ORAL DAILY
Qty: 90 TABLET | Refills: 3 | Status: SHIPPED | OUTPATIENT
Start: 2020-11-09 | End: 2022-01-14

## 2020-11-09 ASSESSMENT — ACTIVITIES OF DAILY LIVING (ADL): CURRENT_FUNCTION: NO ASSISTANCE NEEDED

## 2020-11-09 ASSESSMENT — MIFFLIN-ST. JEOR: SCORE: 1356.32

## 2020-11-09 NOTE — PATIENT INSTRUCTIONS
Patient Education   Personalized Prevention Plan  You are due for the preventive services outlined below.  Your care team is available to assist you in scheduling these services.  If you have already completed any of these items, please share that information with your care team to update in your medical record.  Health Maintenance Due   Topic Date Due     Hepatitis C Screening  01/09/1958     Zoster (Shingles) Vaccine (2 of 3) 11/27/2009     FALL RISK ASSESSMENT  06/07/2020     Your Health Risk Assessment indicates you feel you are not in good health    A healthy lifestyle helps keep the body fit and the mind alert. It helps protect you from disease, helps you fight disease, and helps prevent chronic disease (disease that doesn't go away) from getting worse. This is important as you get older and begin to notice twinges in muscles and joints and a decline in the strength and stamina you once took for granted. A healthy lifestyle includes good healthcare, good nutrition, weight control, recreation, and regular exercise. Avoid harmful substances and do what you can to keep safe. Another part of a healthy lifestyle is stay mentally active and socially involved.    Good healthcare     Have a wellness visit every year.     If you have new symptoms, let us know right away. Don't wait until the next checkup.     Take medicines exactly as prescribed and keep your medicines in a safe place. Tell us if your medicine causes problems.   Healthy diet and weight control     Eat 3 or 4 small, nutritious, low-fat, high-fiber meals a day. Include a variety of fruits, vegetables, and whole-grain foods.     Make sure you get enough calcium in your diet. Calcium, vitamin D, and exercise help prevent osteoporosis (bone thinning).     If you live alone, try eating with others when you can. That way you get a good meal and have company while you eat it.     Try to keep a healthy weight. If you eat more calories than your body uses for  energy, it will be stored as fat and you will gain weight.     Recreation   Recreation is not limited to sports and team events. It includes any activity that provides relaxation, interest, enjoyment, and exercise. Recreation provides an outlet for physical, mental, and social energy. It can give a sense of worth and achievement. It can help you stay healthy.    Mental Exercise and Social Involvement  Mental and emotional health is as important as physical health. Keep in touch with friends and family. Stay as active as possible. Continue to learn and challenge yourself.   Things you can do to stay mentally active are:    Learn something new, like a foreign language or musical instrument.     Play SCRABBLE or do crossword puzzles. If you cannot find people to play these games with you at home, you can play them with others on your computer through the Internet.     Join a games club--anything from card games to chess or checkers or lawn bowling.     Start a new hobby.     Go back to school.     Volunteer.     Read.   Keep up with world events.    Understanding USDA MyPlate  The USDA (U.S. Department of Agriculture) has guidelines to help you make healthy food choices. These are called MyPlate. MyPlate shows the food groups that make up healthy meals using the image of a place setting. Before you eat, think about the healthiest choices for what to put onto your plate or into your cup or bowl. To learn more about building a healthy plate, visit www.choosemyplate.gov.    The food groups    Fruits. Any fruit or 100% fruit juice counts as part of the Fruit Group. Fruits may be fresh, canned, frozen, or dried, and may be whole, cut-up, or pureed. Make half your plate fruits and vegetables.    Vegetables. Any vegetable or 100% vegetable juice counts as a member of the Vegetable Group. Vegetables may be fresh, frozen, canned, or dried. They can be served raw or cooked and may be whole, cut-up, or mashed. Make half your  plate fruits and vegetables.    Grains. All foods made from grains are part of the Grains Group. These include wheat, rice, oats, cornmeal, and barley such as bread, pasta, oatmeal, cereal, tortillas, and grits. Grains should be no more than a quarter of your plate. At least half of your grains should be whole grains.    Protein. This group includes meat, poultry, seafood, beans and peas, eggs, processed soy products (like tofu), nuts (including nut butters), and seeds. Make protein choices no more than a quarter of your plate. Meat and poultry choices should be lean or low fat.    Dairy. All fluid milk products and foods made from milk that contain calcium, like yogurt and cheese, are part of the Dairy Group. (Foods that have little calcium, such as cream, butter, and cream cheese, are not part of the group.) Most dairy choices should be low-fat or fat-free.    Oils. These are fats that are liquid at room temperature. They include canola, corn, olive, soybean, and sunflower oil. Foods that are mainly oil include mayonnaise, certain salad dressings, and soft margarines. You should have only 5 to 7 teaspoons of oils a day. You probably already get this much from the food you eat.  [employer] last reviewed this educational content on 8/1/2017 2000-2020 [employer]. [address] [place] [id number]. All rights reserved. This information is not intended as a substitute for professional medical care. Always follow your healthcare professional's instructions.          Signs of Hearing Loss      Hearing much better with one ear can be a sign of hearing loss.   Hearing loss is a problem shared by many people. In fact, it is one of the most common health problems, particularly as people age. Most people age 65 and older have some hearing loss. By age 80, almost everyone does. Hearing loss often occurs slowly over the years. So you may not realize your hearing has gotten worse.  Have your hearing  checked  Call your healthcare provider if you:    Have to strain to hear normal conversation    Have to watch other people s faces very carefully to follow what they re saying    Need to ask people to repeat what they ve said    Often misunderstand what people are saying    Turn the volume of the television or radio up so high that others complain    Feel that people are mumbling when they re talking to you    Find that the effort to hear leaves you feeling tired and irritated    Notice, when using the phone, that you hear better with one ear than the other  [employer] last reviewed this educational content on 1/1/2020 2000-2020 [employer]. [address] [place] [address], [place] [address]. All rights reserved. This information is not intended as a substitute for professional medical care. Always follow your healthcare professional's instructions.

## 2020-11-09 NOTE — PROGRESS NOTES
"SUBJECTIVE:   Aurora Figueroa is a 80 year old female who presents for Preventive Visit.      Patient has been advised of split billing requirements and indicates understanding: Yes   Are you in the first 12 months of your Medicare coverage?  No    Healthy Habits:     In general, how would you rate your overall health?  Fair    Frequency of exercise:  2-3 days/week    Duration of exercise:  15-30 minutes    Do you usually eat at least 4 servings of fruit and vegetables a day, include whole grains    & fiber and avoid regularly eating high fat or \"junk\" foods?  No    Taking medications regularly:  Yes    Barriers to taking medications:  None    Medication side effects:  None    Ability to successfully perform activities of daily living:  No assistance needed    Home Safety:  Throw rugs in the hallway and lack of grab bars in the bathroom    Hearing Impairment:  Difficulty following a conversation in a noisy restaurant or crowded room, difficult to understand a speaker at a public meeting or [employer] service, need to ask people to speak up or repeat themselves and difficulty understanding soft or whispered speech    In the past 6 months, have you been bothered by leaking of urine?  No    In general, how would you rate your overall mental or emotional health?  Good      PHQ-2 Total Score: 0    Additional concerns today:  Yes    Do you feel safe in your environment? Yes    Have you ever done Advance Care Planning? (For example, a Health Directive, POLST, or a discussion with a medical provider or your loved ones about your wishes): Yes, patient states has an Advance Care Planning document and will bring a copy to the clinic.      Fall risk  Fallen 2 or more times in the past year?: No  Any fall with injury in the past year?: No    Cognitive Screening   1) Repeat 3 items (Leader, Season, Table)    2) Clock draw: NORMAL  3) 3 item recall: Recalls 3 objects  Results: 3 items recalled: COGNITIVE IMPAIRMENT LESS " LIKELY    Mini-CogTM Copyright MALENA Diaz. Licensed by the author for use in [facility]; reprinted with permission (cary@.[email or web address]). All rights reserved.      Do you have sleep apnea, excessive snoring or daytime drowsiness?: no    Reviewed and updated as needed this visit by clinical staff  Tobacco  Allergies  Meds  Problems  Med Hx  Surg Hx  Fam Hx  Soc Hx          Left ear wax  right h ip pain  small stools    Reviewed and updated as needed this visit by Provider  Tobacco  Allergies  Meds  Problems  Med Hx  Surg Hx  Fam Hx         Social History     Tobacco Use     Smoking status: Never Smoker     Smokeless tobacco: Never Used   Substance Use Topics     Alcohol use: Yes     Alcohol/week: 0.0 standard drinks     Comment: rare     If you drink alcohol do you typically have >3 drinks per day or >7 drinks per week? No    Alcohol Use 11/9/2020   Prescreen: >3 drinks/day or >7 drinks/week? No   Prescreen: >3 drinks/day or >7 drinks/week? -             Current providers sharing in care for this patient include:   Patient Care Team:  Darlene Nicole PA-C as PCP - General (Family Medicine)  Mikael Warren MD as Assigned PCP  Adrian Crawford MD as Assigned Surgical Provider    The following health maintenance items are reviewed in Epic and correct as of today:  Health Maintenance   Topic Date Due     HEPATITIS C SCREENING  01/09/1958     ZOSTER IMMUNIZATION (2 of 3) 11/27/2009     TSH W/FREE T4 REFLEX  05/08/2020     LIPID  06/07/2020     FALL RISK ASSESSMENT  06/07/2020     MAMMO SCREENING  04/10/2021     MEDICARE ANNUAL WELLNESS VISIT  11/09/2021     ADVANCE CARE PLANNING  11/09/2025     DTAP/TDAP/TD IMMUNIZATION (3 - Td) 07/30/2028     DEXA  Completed     PHQ-2  Completed     INFLUENZA VACCINE  Completed     Pneumococcal Vaccine: 65+ Years  Completed     Pneumococcal Vaccine: Pediatrics (0 to 5 Years) and At-Risk Patients (6 to 64 Years)  Aged Out     IPV IMMUNIZATION   "Aged Out     MENINGITIS IMMUNIZATION  Aged Out     HEPATITIS B IMMUNIZATION  Aged Out     BP Readings from Last 3 Encounters:   11/09/20 128/70   02/20/20 120/60   02/03/20 120/60    Wt Readings from Last 3 Encounters:   11/09/20 87.5 kg (193 lb)   02/20/20 89.4 kg (197 lb)   02/03/20 89.4 kg (197 lb)                  Recent Labs   Lab Test 11/05/19  0939 06/07/19  0854 05/08/19  0921 10/11/18  0810 08/03/17  1049   A1C 5.8*  --  5.9*  --  5.5   LDL  --  85 92 88 88   HDL  --  58 53 54  --    TRIG  --  98 88 85  --    ALT  --  24  --  23 27   CR  --   --  0.88 0.87  --    GFRESTIMATED  --   --  63 63  --    GFRESTBLACK  --   --  73 76  --    POTASSIUM  --   --  4.0 3.9  --    TSH  --   --  3.10 4.23* 2.50      Mammogram Screening: Patient over age 75, has elected to continue with mammography screening.    Review of Systems  Constitutional, HEENT, cardiovascular, pulmonary, GI, , musculoskeletal, neuro, skin, endocrine and psych systems are negative, except as otherwise noted.    OBJECTIVE:   /70   Pulse 68   Temp 97  F (36.1  C)   Resp 20   Ht 1.651 m (5' 5\")   Wt 87.5 kg (193 lb)   LMP  (LMP Unknown)   SpO2 97%   BMI 32.12 kg/m   Estimated body mass index is 32.12 kg/m  as calculated from the following:    Height as of this encounter: 1.651 m (5' 5\").    Weight as of this encounter: 87.5 kg (193 lb).  Physical Exam  GENERAL APPEARANCE: healthy, alert and no distress  EYES: Eyes grossly normal to inspection, PERRL and conjunctivae and sclerae normal  HENT: nose and mouth without ulcers or lesions, oropharynx clear, oral mucous membranes moist and left ear: occluded with wax  NECK: no adenopathy, no asymmetry, masses, or scars and thyroid normal to palpation  RESP: lungs clear to auscultation - no rales, rhonchi or wheezes  BREAST: normal without masses, tenderness or nipple discharge and no palpable axillary masses or adenopathy  CV: regular rate and rhythm, normal S1 S2, no S3 or S4, no murmur, " "click or rub, no peripheral edema and peripheral pulses strong  ABDOMEN: soft, nontender, no hepatosplenomegaly, no masses and bowel sounds normal  MS: no musculoskeletal defects are noted and gait is age appropriate without ataxia  SKIN: no suspicious lesions or rashes  NEURO: Normal strength and tone, sensory exam grossly normal, mentation intact and speech normal  PSYCH: mentation appears normal and affect normal/bright    Diagnostic Test Results:  Labs reviewed in Epic    ASSESSMENT / PLAN:       ICD-10-CM    1. Medicare annual wellness visit, subsequent  Z00.00 Lipid panel reflex to direct LDL Fasting     Comprehensive metabolic panel     CBC with platelets   2. Hypothyroidism due to acquired atrophy of thyroid  E03.4 TSH with free T4 reflex     levothyroxine (SYNTHROID/LEVOTHROID) 50 MCG tablet   3. Glucose intolerance (impaired glucose tolerance)  R73.02 Hemoglobin A1c   4. Visit for screening mammogram  Z12.31 MA Screening Digital Bilateral   5. Hypercholesterolemia  E78.00 simvastatin (ZOCOR) 40 MG tablet   6. Benign essential hypertension  I10 metoprolol succinate ER (TOPROL-XL) 25 MG 24 hr tablet     Cerumen is noted via otoscopic examination in the left external ear causing moderate obstruction with impaction.  Removal deemed medically necessary due loss of hearing secondary to obstruction.  Cerumen was removed by provider with syringing and manual debridement with wax curette. Instructions for home care to prevent wax buildup are given. Pt tolerated procedure well without complication.        Patient has been advised of split billing requirements and indicates understanding: Yes  COUNSELING:  Reviewed preventive health counseling, as reflected in patient instructions    Estimated body mass index is 32.12 kg/m  as calculated from the following:    Height as of this encounter: 1.651 m (5' 5\").    Weight as of this encounter: 87.5 kg (193 lb).    Weight management plan: Discussed healthy diet and exercise " guidelines    She reports that she has never smoked. She has never used smokeless tobacco.      Appropriate preventive services were discussed with this patient, including applicable screening as appropriate for cardiovascular disease, diabetes, osteopenia/osteoporosis, and glaucoma.  As appropriate for age/gender, discussed screening for colorectal cancer, prostate cancer, breast cancer, and cervical cancer. Checklist reviewing preventive services available has been given to the patient.    Reviewed patients plan of care and provided an AVS. The Basic Care Plan (routine screening as documented in Health Maintenance) for Aurora meets the Care Plan requirement. This Care Plan has been established and reviewed with the Patient.    Counseling Resources:  ATP IV Guidelines  Pooled Cohorts Equation Calculator  Breast Cancer Risk Calculator  Breast Cancer: Medication to Reduce Risk  FRAX Risk Assessment  ICSI Preventive Guidelines  Dietary Guidelines for Americans, 2010  USDA's MyPlate  ASA Prophylaxis  Lung CA Screening    Darlene Nicole PA-C  [facility]    Identified Health Risks:      The patient was provided with suggestions to help her develop a healthy physical lifestyle.  The patient was counseled and encouraged to consider modifying their diet and eating habits. She was provided with information on recommended healthy diet options.  The patient was provided with written information regarding signs of hearing loss.

## 2020-11-09 NOTE — LETTER
November 10, 2020      Aurora Figueroa  [address] [address]        Dear ,    We are writing to inform you of your test results.    Your test results fall within the expected range(s) or remain unchanged from previous results.  Please continue with current treatment plan. Abdominal labs are normal.  Let me know if you'd like to see GI or get a CT scan to make sure everything is okay with your colon.    Resulted Orders   Hemoglobin A1c   Result Value Ref Range    Hemoglobin A1C 5.8 (H) 0 - 5.6 %      Comment:      Normal <5.7% Prediabetes 5.7-6.4%  Diabetes 6.5% or higher - adopted from ADA   consensus guidelines.     Lipid panel reflex to direct LDL Fasting   Result Value Ref Range    Cholesterol 157 <200 mg/dL    Triglycerides 112 <150 mg/dL      Comment:      Fasting specimen    HDL Cholesterol 48 (L) >49 mg/dL    LDL Cholesterol Calculated 87 <100 mg/dL      Comment:      Desirable:       <100 mg/dl    Non HDL Cholesterol 109 <130 mg/dL   Comprehensive metabolic panel   Result Value Ref Range    Sodium 139 133 - 144 mmol/L    Potassium 3.8 3.4 - 5.3 mmol/L    Chloride 108 94 - 109 mmol/L    Carbon Dioxide 26 20 - 32 mmol/L    Anion Gap 5 3 - 14 mmol/L    Glucose 105 (H) 70 - 99 mg/dL      Comment:      Fasting specimen    Urea Nitrogen 23 7 - 30 mg/dL    Creatinine 0.74 0.52 - 1.04 mg/dL    GFR Estimate 77 >60 mL/min/[1.73_m2]    Calcium 9.2 8.5 - 10.1 mg/dL    Bilirubin Total 0.5 0.2 - 1.3 mg/dL    Albumin 3.9 3.4 - 5.0 g/dL    Protein Total 7.4 6.8 - 8.8 g/dL    Alkaline Phosphatase 80 40 - 150 U/L    ALT 35 0 - 50 U/L    AST 30 0 - 45 U/L   CBC with platelets   Result Value Ref Range    WBC 5.8 4.0 - 11.0 10e9/L    RBC Count 4.31 3.8 - 5.2 10e12/L    Hemoglobin 13.3 11.7 - 15.7 g/dL    Hematocrit 40.2 35.0 - 47.0 %    MCV 93 78 - 100 fl    MCH 30.9 26.5 - 33.0 pg    MCHC 33.1 31.5 - 36.5 g/dL    RDW 13.6 10.0 - 15.0 %    Platelet Count 144 (L) 150 - 450 10e9/L   TSH  with free T4 reflex   Result Value Ref Range    TSH 2.37 0.40 - 4.00 mU/L   Lipase   Result Value Ref Range    Lipase 148 73 - 393 U/L   Amylase   Result Value Ref Range    Amylase 59 30 - 110 U/L       If you have any questions or concerns, please call the clinic at the number listed above.       Sincerely,        Darlene Nicole PA-C

## 2020-11-10 LAB
ALBUMIN SERPL-MCNC: 3.9 G/DL (ref 3.4–5)
ALP SERPL-CCNC: 80 U/L (ref 40–150)
ALT SERPL W P-5'-P-CCNC: 35 U/L (ref 0–50)
AMYLASE SERPL-CCNC: 59 U/L (ref 30–110)
ANION GAP SERPL CALCULATED.3IONS-SCNC: 5 MMOL/L (ref 3–14)
AST SERPL W P-5'-P-CCNC: 30 U/L (ref 0–45)
BILIRUB SERPL-MCNC: 0.5 MG/DL (ref 0.2–1.3)
BUN SERPL-MCNC: 23 MG/DL (ref 7–30)
CALCIUM SERPL-MCNC: 9.2 MG/DL (ref 8.5–10.1)
CHLORIDE SERPL-SCNC: 108 MMOL/L (ref 94–109)
CHOLEST SERPL-MCNC: 157 MG/DL
CO2 SERPL-SCNC: 26 MMOL/L (ref 20–32)
CREAT SERPL-MCNC: 0.74 MG/DL (ref 0.52–1.04)
GFR SERPL CREATININE-BSD FRML MDRD: 77 ML/MIN/{1.73_M2}
GLUCOSE SERPL-MCNC: 105 MG/DL (ref 70–99)
HDLC SERPL-MCNC: 48 MG/DL
LDLC SERPL CALC-MCNC: 87 MG/DL
NONHDLC SERPL-MCNC: 109 MG/DL
POTASSIUM SERPL-SCNC: 3.8 MMOL/L (ref 3.4–5.3)
PROT SERPL-MCNC: 7.4 G/DL (ref 6.8–8.8)
SODIUM SERPL-SCNC: 139 MMOL/L (ref 133–144)
TRIGL SERPL-MCNC: 112 MG/DL
TSH SERPL DL<=0.005 MIU/L-ACNC: 2.37 MU/L (ref 0.4–4)

## 2020-11-16 ENCOUNTER — ANCILLARY PROCEDURE (OUTPATIENT)
Dept: GENERAL RADIOLOGY | Facility: CLINIC | Age: 80
End: 2020-11-16
Attending: ORTHOPAEDIC SURGERY
Payer: MEDICARE

## 2020-11-16 ENCOUNTER — PREP FOR PROCEDURE (OUTPATIENT)
Dept: ORTHOPEDICS | Facility: CLINIC | Age: 80
End: 2020-11-16

## 2020-11-16 ENCOUNTER — OFFICE VISIT (OUTPATIENT)
Dept: ORTHOPEDICS | Facility: CLINIC | Age: 80
End: 2020-11-16
Attending: PHYSICIAN ASSISTANT
Payer: MEDICARE

## 2020-11-16 VITALS
WEIGHT: 193 LBS | HEIGHT: 65 IN | SYSTOLIC BLOOD PRESSURE: 130 MMHG | DIASTOLIC BLOOD PRESSURE: 78 MMHG | BODY MASS INDEX: 32.15 KG/M2

## 2020-11-16 DIAGNOSIS — M16.11 PRIMARY OSTEOARTHRITIS OF RIGHT HIP: ICD-10-CM

## 2020-11-16 DIAGNOSIS — M16.11 PRIMARY OSTEOARTHRITIS OF RIGHT HIP: Primary | ICD-10-CM

## 2020-11-16 DIAGNOSIS — G89.29 CHRONIC RIGHT HIP PAIN: ICD-10-CM

## 2020-11-16 DIAGNOSIS — M25.551 CHRONIC RIGHT HIP PAIN: ICD-10-CM

## 2020-11-16 DIAGNOSIS — M25.551 CHRONIC RIGHT HIP PAIN: Primary | ICD-10-CM

## 2020-11-16 DIAGNOSIS — G89.29 CHRONIC RIGHT HIP PAIN: Primary | ICD-10-CM

## 2020-11-16 PROCEDURE — 73502 X-RAY EXAM HIP UNI 2-3 VIEWS: CPT | Performed by: ORTHOPAEDIC SURGERY

## 2020-11-16 PROCEDURE — 99203 OFFICE O/P NEW LOW 30 MIN: CPT | Performed by: ORTHOPAEDIC SURGERY

## 2020-11-16 ASSESSMENT — MIFFLIN-ST. JEOR: SCORE: 1356.32

## 2020-11-16 NOTE — PROGRESS NOTES
HISTORY OF PRESENT ILLNESS:    Aurora Figueroa is a 80 year old female who is seen in consultation at the request of Dr. Nicole for right hip pain.    Present symptoms: Patient reports chronic right hip pain ongoing for 2-3 years. Patient notes pain in right groin, right lateral hip and right anterior thigh. Patient notes pain at worst is 6/10 and currently is 3/10. Pain increases with scrubbing floors, vacuuming, carrying / lifting heavy items, going up and down stairs, and prolonged walking. She notes pain is usually better in the morning and progressively gets worse throughout the day. Pain improves with Tylenol 1300mg at bedtime. She notes burning in right anterior and lateral thigh. She notes a feeling of instability and weakness of the right hip. She is concerned about falling.  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     Past Medical History:   Diagnosis Date     Basal cell carcinoma      Hyperlipidemia      Hypothyroidism      LUMBOSACRAL NEURITIS NOS 4/12/2007     Sleep apnea     does not wear CPAP     Squamous cell carcinoma      Tachycardia        Past Surgical History:   Procedure Laterality Date     ABDOMEN SURGERY  9/2009    gastric bypass 09/2009     APPENDECTOMY  1970    1970     ARTHROPLASTY REVISION KNEE Right 2/4/2016    Procedure: ARTHROPLASTY REVISION KNEE;  Surgeon: Vincent Allen MD;  Location:  OR     COLONOSCOPY  7/14/2014    Procedure: COLONOSCOPY;  Surgeon: Jamari Jj MD;  Location:  GI     ENT SURGERY  1947    tosillectomy      EYE SURGERY  2011    macular repair 2011 left eye     EYE SURGERY  2012    cataract removal left eye.     GYN SURGERY  1978    c section     HERNIA REPAIR  1990    umbilical 1990     ORTHOPEDIC SURGERY  1993, 1995    arthroscopic     ORTHOPEDIC SURGERY  1996     Bilateral TKA     ORTHOPEDIC SURGERY  2008    Left hip replacement.       Family History   Problem Relation Age of Onset     Heart Disease Mother      Skin Cancer Mother      Heart Disease Son      Unknown/Adopted Father      Heart Disease Sister      Skin Cancer Sister      Skin Cancer Brother      Diabetes No family hx of      Coronary Artery Disease No family hx of      Hypertension No family hx of      Hyperlipidemia No family hx of      Cerebrovascular Disease No family hx of      Breast Cancer No family hx of      Colon Cancer No family hx of      Prostate Cancer No family hx of      Other Cancer No family hx of      Depression No family hx of      Anxiety Disorder No family hx of      Mental Illness No family hx of      Substance Abuse No family hx of      Anesthesia Reaction No family hx of      Asthma No family hx of      Osteoporosis No family hx of      Genetic Disorder No family hx of      Thyroid Disease No family hx of      Obesity No family hx of        Social History     Socioeconomic History     Marital status:      Spouse name: Not on file     Number of children: Not on file     Years of education: Not on file     Highest education level: Not on file   Occupational History     Not on file   Social Needs     Financial resource strain: Not on file     Food insecurity     Worry: Not on file     Inability: Not on file     Transportation needs     Medical: Not on file     Non-medical: Not on file   Tobacco Use     Smoking status: Never Smoker     Smokeless tobacco: Never Used   Substance and Sexual Activity     Alcohol use: Yes     Alcohol/week: 0.0 standard drinks     Comment: rare     Drug use: No     Sexual activity: Not Currently   Lifestyle     Physical activity     Days per week: Not on file     Minutes per session: Not on file     Stress: Not on file   Relationships     Social connections     Talks on phone: Not on file     Gets together: Not on file     Attends Yazidi service: Not on  file     Active member of club or organization: Not on file     Attends meetings of clubs or organizations: Not on file     Relationship status: Not on file     Intimate partner violence     Fear of current or ex partner: Not on file     Emotionally abused: Not on file     Physically abused: Not on file     Forced sexual activity: Not on file   Other Topics Concern     Parent/sibling w/ CABG, MI or angioplasty before 65F 55M? No   Social History Narrative     Not on file       Current Outpatient Medications   Medication Sig Dispense Refill     Cholecalciferol (VITAMIN D) 1000 UNITS capsule Take 1 capsule by mouth daily        Cyanocobalamin (VITAMIN B 12 PO) Take 2,500 mcg by mouth daily sublingual       ferrous sulfate (FEROSUL) 325 (65 Fe) MG tablet Take 325 mg by mouth twice a week        levothyroxine (SYNTHROID/LEVOTHROID) 50 MCG tablet TAKE 1 TABLET BY MOUTH EVERY DAY 90 tablet 3     metoprolol succinate ER (TOPROL-XL) 25 MG 24 hr tablet Take 1 tablet (25 mg) by mouth daily 90 tablet 3     OMEPRAZOLE PO Take 20 mg by mouth daily        Pediatric Multivit-Minerals-C (FLINTSTONES COMPLETE PO) Take 1 tablet by mouth daily        simvastatin (ZOCOR) 40 MG tablet Take 1 tablet (40 mg) by mouth daily 90 tablet 3     triamcinolone 0.1 % EX external cream Apply topically 2 times daily 45 g 1       Allergies   Allergen Reactions     Food Itching     Raw fruit & veg:  Apples, cherries, cantalope, tomatoes,  carrotts.  Eye & throat irritations.     Penicillins Hives     Fruit [Cherry]      Hives, itchy throat tomatoes     Nsaids Other (See Comments)     Patient had gastric bypass surgery.  Should not take oral  NSAID's ever.     Animal Dander      Pruritis,itchi eyes, throat and ears.     E.E.S. GI Disturbance     Erythromycin GI Disturbance     Pollen Extract Itching       REVIEW OF SYSTEMS:  CONSTITUTIONAL:  NEGATIVE for fever, chills, change in weight  INTEGUMENTARY/SKIN:  NEGATIVE for worrisome rashes, moles or  lesions  EYES:  NEGATIVE for vision changes or irritation  ENT/MOUTH:  NEGATIVE for ear, mouth and throat problems  RESP:  NEGATIVE for significant cough or SOB  BREAST:  NEGATIVE for masses, tenderness or discharge  CV:  NEGATIVE for chest pain, palpitations or peripheral edema  GI:  NEGATIVE for nausea, abdominal pain, heartburn, or change in bowel habits  :  Negative   MUSCULOSKELETAL:  See HPI above  NEURO:  NEGATIVE for weakness, dizziness or paresthesias  ENDOCRINE:  NEGATIVE for temperature intolerance, skin/hair changes  HEME/ALLERGY/IMMUNE:  NEGATIVE for bleeding problems  PSYCHIATRIC:  NEGATIVE for changes in mood or affect      PHYSICAL EXAM:  LMP  (LMP Unknown)   There is no height or weight on file to calculate BMI.   GENERAL APPEARANCE: healthy, alert and no distress   HEENT: No apparent thyroid megaly. Clear sclera with normal ocular movement  RESPIRATORY: No labored breathing  SKIN: no suspicious lesions or rashes  NEURO: Normal strength and tone, mentation intact and speech normal  VASCULAR: Good pulses, and capillary refill   LYMPH: no lymphadenopathy   PSYCH:  mentation appears normal and affect normal/bright    MUSCULOSKELETAL:      Not in acute distress  Minimal difficulty getting up from sitting  She walks with a minimal limp  Limited painful right hip range of motion with no internal rotation  Right hip external rotation is 15 degrees with the pain  Flexion is to 90 degrees  Bilateral long anterior incisions in the knees from her surgeries most recently right knee revision in 2017  Range of motion is 0-100  No swelling or erythema noted  Grossly motor strength is full  Sensation is intact  Circulation is intact      ASSESSMENT:  Severe right hip DJD  Doing well from left total of arthroplasty  Status post right knee revision  Status post left knee arthroplasty      PLAN:      The x-ray images from today were visualized.  Findings were thoroughly explained.  She is bone-on-bone with complete  obliteration of the joint space in the right hip.  Early lucency around the femoral component is noted but she is not symptomatic.    Given the duration of her pain and radiographic findings, at this point, she was felt to be a good candidate for total of arthroplasty.  Because of her age, waiting and delaying for hip replacement may not be warranted.    We had then had a long discussion about the nature of the surgery potential risks and overnight stay hospitalization.    Potential risks that we discussed included but not exclusive of infection, DVT, periprosthetic fracture, dislocation, blood loss, neurovascular compromise etc.    Right total knee arthroplasty will be scheduled according to her convenience.        Imaging Interpretation:   Severe right hip DJD.  Left proximal femoral component has early lucency around the component but no gross loosening.  No acute findings are noted.      Robbie Gamble MD  Department of Orthopedic Surgery        Disclaimer: This note consists of symbols derived from keyboarding, dictation and/or voice recognition software. As a result, there may be errors in the script that have gone undetected. Please consider this when interpreting information found in this chart.

## 2020-11-16 NOTE — LETTER
11/16/2020         RE: Aurora Figueroa  [address] [facility] [address]        Dear Colleague,    Thank you for referring your patient, Aurora Figueroa, to the [facility] [place]. Please see a copy of my visit note below.    HISTORY OF PRESENT ILLNESS:    Aurora Figueroa is a 80 year old female who is seen in consultation at the request of Dr. Nicole for right hip pain.    Present symptoms: Patient reports chronic right hip pain ongoing for 2-3 years. Patient notes pain in right groin, right lateral hip and right anterior thigh. Patient notes pain at worst is 6/10 and currently is 3/10. Pain increases with scrubbing floors, vacuuming, carrying / lifting heavy items, going up and down stairs, and prolonged walking. She notes pain is usually better in the morning and progressively gets worse throughout the day. Pain improves with Tylenol 1300mg at bedtime. She notes burning in right anterior and lateral thigh. She notes a feeling of instability and weakness of the right hip. She is concerned about falling.  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     Past Medical History:   Diagnosis Date     Basal cell carcinoma      Hyperlipidemia      Hypothyroidism      LUMBOSACRAL NEURITIS NOS 4/12/2007     Sleep apnea     does not wear CPAP     Squamous cell carcinoma      Tachycardia        Past Surgical History:   Procedure Laterality Date     ABDOMEN SURGERY  9/2009    gastric bypass 09/2009     APPENDECTOMY  1970    1970     ARTHROPLASTY REVISION KNEE Right 2/4/2016    Procedure: ARTHROPLASTY REVISION KNEE;  Surgeon: Vincent Allen MD;  Location:  OR     COLONOSCOPY  7/14/2014    Procedure: COLONOSCOPY;  Surgeon: Jamari Jj MD;  Location:  GI     ENT SURGERY  1947     tosillectomy      EYE SURGERY  2011    macular repair 2011 left eye     EYE SURGERY  2012    cataract removal left eye.     GYN SURGERY  1978    c section     HERNIA REPAIR  1990    umbilical 1990     ORTHOPEDIC SURGERY  1993, 1995    arthroscopic     ORTHOPEDIC SURGERY  1996    Bilateral TKA     ORTHOPEDIC SURGERY  2008    Left hip replacement.       Family History   Problem Relation Age of Onset     Heart Disease Mother      Skin Cancer Mother      Heart Disease Son      Unknown/Adopted Father      Heart Disease Sister      Skin Cancer Sister      Skin Cancer Brother      Diabetes No family hx of      Coronary Artery Disease No family hx of      Hypertension No family hx of      Hyperlipidemia No family hx of      Cerebrovascular Disease No family hx of      Breast Cancer No family hx of      Colon Cancer No family hx of      Prostate Cancer No family hx of      Other Cancer No family hx of      Depression No family hx of      Anxiety Disorder No family hx of      Mental Illness No family hx of      Substance Abuse No family hx of      Anesthesia Reaction No family hx of      Asthma No family hx of      Osteoporosis No family hx of      Genetic Disorder No family hx of      Thyroid Disease No family hx of      Obesity No family hx of        Social History     Socioeconomic History     Marital status:      Spouse name: Not on file     Number of children: Not on file     Years of education: Not on file     Highest education level: Not on file   Occupational History     Not on file   Social Needs     Financial resource strain: Not on file     Food insecurity     Worry: Not on file     Inability: Not on file     Transportation needs     Medical: Not on file     Non-medical: Not on file   Tobacco Use     Smoking status: Never Smoker     Smokeless tobacco: Never Used   Substance and Sexual Activity     Alcohol use: Yes     Alcohol/week: 0.0 standard drinks     Comment: rare     Drug use: No     Sexual activity:  Not Currently   Lifestyle     Physical activity     Days per week: Not on file     Minutes per session: Not on file     Stress: Not on file   Relationships     Social connections     Talks on phone: Not on file     Gets together: Not on file     Attends Buddhist service: Not on file     Active member of club or organization: Not on file     Attends meetings of clubs or organizations: Not on file     Relationship status: Not on file     Intimate partner violence     Fear of current or ex partner: Not on file     Emotionally abused: Not on file     Physically abused: Not on file     Forced sexual activity: Not on file   Other Topics Concern     Parent/sibling w/ CABG, MI or angioplasty before 65F 55M? No   Social History Narrative     Not on file       Current Outpatient Medications   Medication Sig Dispense Refill     Cholecalciferol (VITAMIN D) 1000 UNITS capsule Take 1 capsule by mouth daily        Cyanocobalamin (VITAMIN B 12 PO) Take 2,500 mcg by mouth daily sublingual       ferrous sulfate (FEROSUL) 325 (65 Fe) MG tablet Take 325 mg by mouth twice a week        levothyroxine (SYNTHROID/LEVOTHROID) 50 MCG tablet TAKE 1 TABLET BY MOUTH EVERY DAY 90 tablet 3     metoprolol succinate ER (TOPROL-XL) 25 MG 24 hr tablet Take 1 tablet (25 mg) by mouth daily 90 tablet 3     OMEPRAZOLE PO Take 20 mg by mouth daily        Pediatric Multivit-Minerals-C (FLINTSTONES COMPLETE PO) Take 1 tablet by mouth daily        simvastatin (ZOCOR) 40 MG tablet Take 1 tablet (40 mg) by mouth daily 90 tablet 3     triamcinolone 0.1 % EX external cream Apply topically 2 times daily 45 g 1       Allergies   Allergen Reactions     Food Itching     Raw fruit & veg:  Apples, cherries, cantalope, tomatoes,  carrotts.  Eye & throat irritations.     Penicillins Hives     Fruit [Cherry]      Hives, itchy throat tomatoes     Nsaids Other (See Comments)     Patient had gastric bypass surgery.  Should not take oral  NSAID's ever.     Animal Dander       Pruritis,itchi eyes, throat and ears.     E.E.S. GI Disturbance     Erythromycin GI Disturbance     Pollen Extract Itching       REVIEW OF SYSTEMS:  CONSTITUTIONAL:  NEGATIVE for fever, chills, change in weight  INTEGUMENTARY/SKIN:  NEGATIVE for worrisome rashes, moles or lesions  EYES:  NEGATIVE for vision changes or irritation  ENT/MOUTH:  NEGATIVE for ear, mouth and throat problems  RESP:  NEGATIVE for significant cough or SOB  BREAST:  NEGATIVE for masses, tenderness or discharge  CV:  NEGATIVE for chest pain, palpitations or peripheral edema  GI:  NEGATIVE for nausea, abdominal pain, heartburn, or change in bowel habits  :  Negative   MUSCULOSKELETAL:  See HPI above  NEURO:  NEGATIVE for weakness, dizziness or paresthesias  ENDOCRINE:  NEGATIVE for temperature intolerance, skin/hair changes  HEME/ALLERGY/IMMUNE:  NEGATIVE for bleeding problems  PSYCHIATRIC:  NEGATIVE for changes in mood or affect      PHYSICAL EXAM:  LMP  (LMP Unknown)   There is no height or weight on file to calculate BMI.   GENERAL APPEARANCE: healthy, alert and no distress   HEENT: No apparent thyroid megaly. Clear sclera with normal ocular movement  RESPIRATORY: No labored breathing  SKIN: no suspicious lesions or rashes  NEURO: Normal strength and tone, mentation intact and speech normal  VASCULAR: Good pulses, and capillary refill   LYMPH: no lymphadenopathy   PSYCH:  mentation appears normal and affect normal/bright    MUSCULOSKELETAL:      Not in acute distress  Minimal difficulty getting up from sitting  She walks with a minimal limp  Limited painful right hip range of motion with no internal rotation  Right hip external rotation is 15 degrees with the pain  Flexion is to 90 degrees  Bilateral long anterior incisions in the knees from her surgeries most recently right knee revision in 2017  Range of motion is 0-100  No swelling or erythema noted  Grossly motor strength is full  Sensation is intact  Circulation is  intact      ASSESSMENT:  Severe right hip DJD  Doing well from left total of arthroplasty  Status post right knee revision  Status post left knee arthroplasty      PLAN:      The x-ray images from today were visualized.  Findings were thoroughly explained.  She is bone-on-bone with complete obliteration of the joint space in the right hip.  Early lucency around the femoral component is noted but she is not symptomatic.    Given the duration of her pain and radiographic findings, at this point, she was felt to be a good candidate for total of arthroplasty.  Because of her age, waiting and delaying for hip replacement may not be warranted.    We had then had a long discussion about the nature of the surgery potential risks and overnight stay hospitalization.    Potential risks that we discussed included but not exclusive of infection, DVT, periprosthetic fracture, dislocation, blood loss, neurovascular compromise etc.    Right total knee arthroplasty will be scheduled according to her convenience.        Imaging Interpretation:   Severe right hip DJD.  Left proximal femoral component has early lucency around the component but no gross loosening.  No acute findings are noted.      Robbie Gamble MD  Department of Orthopedic Surgery        Disclaimer: This note consists of symbols derived from keyboarding, dictation and/or voice recognition software. As a result, there may be errors in the script that have gone undetected. Please consider this when interpreting information found in this chart.        Again, thank you for allowing me to participate in the care of your patient.        Sincerely,        Robbie Gamble MD

## 2020-11-20 ENCOUNTER — TELEPHONE (OUTPATIENT)
Dept: ORTHOPEDICS | Facility: CLINIC | Age: 80
End: 2020-11-20

## 2020-11-20 DIAGNOSIS — Z11.59 ENCOUNTER FOR SCREENING FOR OTHER VIRAL DISEASES: Primary | ICD-10-CM

## 2020-11-20 NOTE — TELEPHONE ENCOUNTER
Attempt to reach patient to schedule surgery for R-YUAN.  Left message for patient to call the surgery scheduling line at [phone].     Mian Urena, Surgery Scheduler

## 2020-11-24 NOTE — TELEPHONE ENCOUNTER
Scheduled surgery.     ATC/Triage: please place COVID order.     Type of surgery: right YUAN  Location of surgery: [facility]  Date and time of surgery: 2/2/21 @ 0945   Surgeon: Mavis   Pre-Op Appt Date: to be scheduled in January 2021  Post-Op Appt Date: 2/15/21   Packet sent out: Holding packet until Jan 2021  Pre-cert/Authorization completed:  No  Date: 11/24/20    Mian Urena Surgery Scheduler

## 2020-11-25 PROBLEM — M16.11 PRIMARY OSTEOARTHRITIS OF RIGHT HIP: Status: ACTIVE | Noted: 2020-11-25

## 2020-12-01 DIAGNOSIS — Z11.59 ENCOUNTER FOR SCREENING FOR OTHER VIRAL DISEASES: Primary | ICD-10-CM

## 2020-12-11 ENCOUNTER — HOSPITAL ENCOUNTER (OUTPATIENT)
Dept: MAMMOGRAPHY | Facility: CLINIC | Age: 80
Discharge: HOME | End: 2020-12-11
Attending: PHYSICIAN ASSISTANT | Admitting: PHYSICIAN ASSISTANT
Payer: MEDICARE

## 2020-12-11 DIAGNOSIS — Z12.31 VISIT FOR SCREENING MAMMOGRAM: ICD-10-CM

## 2020-12-11 PROCEDURE — 77067 SCR MAMMO BI INCL CAD: CPT

## 2021-01-05 ENCOUNTER — TELEPHONE (OUTPATIENT)
Dept: ORTHOPEDICS | Facility: CLINIC | Age: 81
End: 2021-01-05

## 2021-01-05 NOTE — TELEPHONE ENCOUNTER
Patient inquired about the total joint replacement booklet.     Please send a copy to patient's home address.     Mian Urena, Surgery Scheduler

## 2021-01-19 ENCOUNTER — OFFICE VISIT (OUTPATIENT)
Dept: INTERNAL MEDICINE | Facility: CLINIC | Age: 81
End: 2021-01-19
Payer: MEDICARE

## 2021-01-19 VITALS
TEMPERATURE: 96.3 F | SYSTOLIC BLOOD PRESSURE: 112 MMHG | DIASTOLIC BLOOD PRESSURE: 70 MMHG | WEIGHT: 188.9 LBS | HEIGHT: 65 IN | HEART RATE: 78 BPM | RESPIRATION RATE: 18 BRPM | BODY MASS INDEX: 31.47 KG/M2

## 2021-01-19 DIAGNOSIS — E03.4 HYPOTHYROIDISM DUE TO ACQUIRED ATROPHY OF THYROID: Chronic | ICD-10-CM

## 2021-01-19 DIAGNOSIS — Z01.818 ENCOUNTER FOR PREOPERATIVE ASSESSMENT: Primary | ICD-10-CM

## 2021-01-19 DIAGNOSIS — R00.0 TACHYCARDIA: ICD-10-CM

## 2021-01-19 DIAGNOSIS — M16.11 PRIMARY OSTEOARTHRITIS OF RIGHT HIP: ICD-10-CM

## 2021-01-19 DIAGNOSIS — E78.00 HYPERCHOLESTEROLEMIA: ICD-10-CM

## 2021-01-19 LAB
ERYTHROCYTE [DISTWIDTH] IN BLOOD BY AUTOMATED COUNT: 13.9 % (ref 10–15)
HCT VFR BLD AUTO: 43 % (ref 35–47)
HGB BLD-MCNC: 13.7 G/DL (ref 11.7–15.7)
MCH RBC QN AUTO: 30.2 PG (ref 26.5–33)
MCHC RBC AUTO-ENTMCNC: 31.9 G/DL (ref 31.5–36.5)
MCV RBC AUTO: 95 FL (ref 78–100)
PLATELET # BLD AUTO: 159 10E9/L (ref 150–450)
RBC # BLD AUTO: 4.54 10E12/L (ref 3.8–5.2)
WBC # BLD AUTO: 7.6 10E9/L (ref 4–11)

## 2021-01-19 PROCEDURE — 99214 OFFICE O/P EST MOD 30 MIN: CPT | Performed by: INTERNAL MEDICINE

## 2021-01-19 PROCEDURE — 85027 COMPLETE CBC AUTOMATED: CPT | Performed by: INTERNAL MEDICINE

## 2021-01-19 PROCEDURE — 36415 COLL VENOUS BLD VENIPUNCTURE: CPT | Performed by: INTERNAL MEDICINE

## 2021-01-19 PROCEDURE — 93000 ELECTROCARDIOGRAM COMPLETE: CPT | Performed by: INTERNAL MEDICINE

## 2021-01-19 ASSESSMENT — MIFFLIN-ST. JEOR: SCORE: 1332.72

## 2021-01-19 NOTE — PATIENT INSTRUCTIONS
- It's okay to take all of your medications as prescribed, including the morning of the procedure, unless your surgical team tells you otherwise.  - Avoid NSAIDs like ibuprofen or Aleve for 5 days prior to the procedure.

## 2021-01-19 NOTE — PROGRESS NOTES
[address] [address]  Phone: [phone]  Primary Provider: Darlene Nicole  Pre-op Performing Provider: VALENTIN PITT    PREOPERATIVE EVALUATION:  Today's date: 1/19/2021    Aurora Figueroa is a 81 year old female who presents for a preoperative evaluation.    Surgical Information:  Surgery/Procedure: Arthroplasty , hip, total right  Surgery Location: [facility]  Surgeon: DR kan  Surgery Date: 2/2/2021  Time of Surgery: 9:50 am  Where patient plans to recover: At home with family  Fax number for surgical facility: Note does not need to be faxed, will be available electronically in Epic.    Type of Anesthesia Anticipated: Choice    Subjective     HPI related to upcoming procedure: Aurora presents today for a pre-op appointment. She's getting her hip replaced. She had her other hip replaced 10-11 years ago. Has had both hips replaced. No complaints today. Tells me her surgeon requested a hemoglobin and EKG.    Preop Questions 1/19/2021   1. Have you ever had a heart attack or stroke? No   2. Have you ever had surgery on your heart or blood vessels, such as a stent placement, a coronary artery bypass, or surgery on an artery in your head, neck, heart, or legs? No   3. Do you have chest pain with activity? No   4. Do you have a history of  heart failure? No   5. Do you currently have a cold, bronchitis or symptoms of other infection? No   6. Do you have a cough, shortness of breath, or wheezing? No   7. Do you or anyone in your family have previous history of blood clots? No   8. Do you or does anyone in your family have a serious bleeding problem such as prolonged bleeding following surgeries or cuts? No   9. Have you ever had problems with anemia or been told to take iron pills? YES - on iron pills twice a week now   10. Have you had any abnormal blood loss such as black, tarry or bloody stools, or abnormal vaginal  bleeding? NO   11. Have you ever had a blood transfusion? UNKNOWN   12. Are you willing to have a blood transfusion if it is medically needed before, during, or after your surgery? Yes   13. Have you or any of your relatives ever had problems with anesthesia? No   14. Do you have sleep apnea, excessive snoring or daytime drowsiness? No   15. Do you have any artifical heart valves or other implanted medical devices like a pacemaker, defibrillator, or continuous glucose monitor? No   16. Do you have artificial joints? YES - Both knees and other hip   17. Are you allergic to latex? YES: She has a slight reaction to it     Health Care Directive:  Patient does not have a Health Care Directive or Living Will: Patient states has Advance Directive and will bring in a copy to clinic.    Preoperative Review of :   reviewed - no record of controlled substances prescribed.    Review of Systems  CONSTITUTIONAL: NEGATIVE for fever, chills, change in weight  INTEGUMENTARY/SKIN: NEGATIVE for worrisome rashes, moles or lesions  EYES: NEGATIVE for vision changes or irritation  ENT/MOUTH: NEGATIVE for ear, mouth and throat problems  RESP: NEGATIVE for significant cough or SOB  BREAST: NEGATIVE for masses, tenderness or discharge  CV: NEGATIVE for chest pain, palpitations or peripheral edema  GI: NEGATIVE for nausea, abdominal pain, heartburn, or change in bowel habits  : NEGATIVE for frequency, dysuria, or hematuria  MUSCULOSKELETAL: NEGATIVE for significant arthralgias or myalgia except as above  NEURO: NEGATIVE for weakness, dizziness or paresthesias  ENDOCRINE: NEGATIVE for temperature intolerance, skin/hair changes  HEME: NEGATIVE for bleeding problems  PSYCHIATRIC: NEGATIVE for changes in mood or affect    Patient Active Problem List    Diagnosis Date Noted     Primary osteoarthritis of right hip 11/25/2020     Priority: Medium     Added automatically from request for surgery [id number]       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  02/16/2017     Priority: Medium     Class 1 obesity due to excess calories with serious comorbidity and body mass index (BMI) of 32.0 to 32.9 in adult 07/01/2016     Priority: Medium     Hypercholesterolemia 07/01/2016     Priority: Medium     Tachycardia since 1978 07/01/2016     Priority: Medium     Status post total right knee replacement on 2/4/16 by Vincent Allen MD 02/15/2016     Priority: Medium     Benign essential hypertension 02/12/2016     Priority: Medium     Failed total knee arthroplasty, initial encounter (H) 02/04/2016     Priority: Medium     Hypothyroidism due to acquired atrophy of thyroid 09/15/2015     Priority: Medium     ACP (advance care planning) 09/15/2015     Priority: Medium     Advance Care Planning 9/15/2015: ACP Review and Resources Provided:  Reviewed chart for advance care plan.  Aurora BEL Figueroa has no plan or code status on file however states presence of ACP document. Copy requested.  Added by Allyson Gamble             Glucose intolerance (impaired glucose tolerance) 12/23/2014     Priority: Medium     Bariatric surgery status - 2009 07/22/2014     Priority: Medium     B-complex deficiency 12/31/2012     Priority: Medium     Trigger finger 12/31/2012     Priority: Medium     Ganglion cyst 12/31/2012     Priority: Medium      Past Medical History:   Diagnosis Date     Arrhythmia     tachycardia intermittant (metoprolol)     Basal cell carcinoma      Gastroesophageal reflux disease      Hyperlipidemia      Hypothyroidism      LUMBOSACRAL NEURITIS NOS 4/12/2007     Pemphigus 2015     Sleep apnea     does not wear CPAP (resolved after gastric bypass surgery)     Squamous cell carcinoma      Tachycardia      Past Surgical History:   Procedure Laterality Date     ABDOMEN SURGERY  9/2009    gastric bypass 09/2009     APPENDECTOMY  1970    1970     ARTHROPLASTY REVISION KNEE Right 2/4/2016    Procedure: ARTHROPLASTY REVISION KNEE;  Surgeon: Alejandro  Vincent Solorznao MD;  Location:  OR     COLONOSCOPY  7/14/2014    Procedure: COLONOSCOPY;  Surgeon: Jamari Jj MD;  Location:  GI     ENT SURGERY  1947    tosillectomy      EYE SURGERY  2011    macular repair 2011 left eye     EYE SURGERY  2012    cataract removal left eye.     GYN SURGERY  1978    c section     HERNIA REPAIR  1990    umbilical 1990     ORTHOPEDIC SURGERY  1993, 1995    arthroscopic both knees     ORTHOPEDIC SURGERY  1996    Bilateral TKA     ORTHOPEDIC SURGERY  2008    Left hip replacement.     SOFT TISSUE SURGERY  2019    basal cell carcinoma removed from face, several spots     Current Outpatient Medications   Medication Sig Dispense Refill     Cholecalciferol (VITAMIN D) 1000 UNITS capsule Take 1 capsule by mouth daily        Cyanocobalamin (VITAMIN B 12 PO) Take 2,500 mcg by mouth daily sublingual       ferrous sulfate (FEROSUL) 325 (65 Fe) MG tablet Take 325 mg by mouth twice a week        levothyroxine (SYNTHROID/LEVOTHROID) 50 MCG tablet TAKE 1 TABLET BY MOUTH EVERY DAY 90 tablet 3     methylcellulose (CITRUCEL) powder Take by mouth daily       metoprolol succinate ER (TOPROL-XL) 25 MG 24 hr tablet Take 1 tablet (25 mg) by mouth daily 90 tablet 3     Multiple Vitamins-Minerals (ICAPS AREDS 2 PO) Take by mouth 2 times daily       OMEPRAZOLE PO Take 20 mg by mouth daily        Pediatric Multivit-Minerals-C (FLINTSTONES COMPLETE PO) Take 1 tablet by mouth daily        simvastatin (ZOCOR) 40 MG tablet Take 1 tablet (40 mg) by mouth daily 90 tablet 3     Allergies   Allergen Reactions     Food Itching     Raw fruit & veg:  Apples, cherries, cantalope, tomatoes,  carrotts.  Eye & throat irritations.     Penicillins Hives     Fruit [Cherry]      Hives, itchy throat tomatoes     Nsaids Other (See Comments)     Patient had gastric bypass surgery.  Should not take oral  NSAID's ever.     Animal Dander      Pruritis,itchi eyes, throat and ears.     E.E.S. GI Disturbance     Erythromycin GI  "Disturbance     Pollen Extract Itching      Social History     Tobacco Use     Smoking status: Never Smoker     Smokeless tobacco: Never Used   Substance Use Topics     Alcohol use: Yes     Alcohol/week: 0.0 standard drinks     Comment: rare     History   Drug Use No         Objective   /70   Pulse 78   Temp 96.3  F (35.7  C) (Temporal)   Resp 18   Ht 1.651 m (5' 5\")   Wt 85.7 kg (188 lb 14.4 oz)   LMP  (LMP Unknown)   BMI 31.43 kg/m      Physical Exam  GENERAL alert and in no distress.  EYES conjunctivae/corneas clear. PERRL. EOMs grossly intact  HENT: NC/AT, facies symmetric  NECK: Neck supple. No LAD, without thyroidmegaly or JVD.  LYMPH: no cervical LAD appreciated  RESP: CTAB. No w/r/r.  CV: RRR, no m/r/g.  GI: NT, ND, without rebound or guarding, no CVA tenderness  MSK: No cyanosis, clubbing or edema noted bilaterally in upper and/or lower extremities  SKIN: no significant ulcers, lesions or rashes on the visualized portions of the skin  NEURO: Alert. Oriented. Gait normal. Sensation grossly WNL.  PSYCH: Linear thought process. Speech normal rate and volume; able to articulate logical thoughts, able to abstract reason. No tangential thoughts, hallucinations, or delusions.    Recent Labs   Lab Test 11/09/20  1023 11/05/19  0939 05/08/19  0921   HGB 13.3 13.7 14.3   *  --  172     --  140   POTASSIUM 3.8  --  4.0   CR 0.74  --  0.88   A1C 5.8* 5.8* 5.9*      Diagnostics:  Labs pending at this time.  Results will be reviewed when available.   EKG: sinus bradycardia, normal axis, normal intervals, no acute ST/T changes c/w ischemia, no LVH by voltage criteria, unchanged from previous tracings    Revised Cardiac Risk Index (RCRI):  The patient has the following serious cardiovascular risks for perioperative complications:   - No serious cardiac risks = 0 points     RCRI Interpretation: 0 points: Class I (very low risk - 0.4% complication rate)       Assessment & Plan   The proposed " surgical procedure is considered INTERMEDIATE risk.    Encounter for preoperative assessment  Primary osteoarthritis of right hip  Hypothyroidism due to acquired atrophy of thyroid  Tachycardia  Hypercholesterolemia  Approval given to proceed with proposed procedure without further diagnostic evaluation. Discussed recommendation to discontinue NSAIDs 5 days prior to procedure to reduce bleeding risk. Medications were reviewed in detail today. On the morning of surgery, take all normal medications with a small sip of water. Resume all medications post-operatively at the same doses unless otherwise directed by surgical care team.  - EKG 12-lead complete w/read - Clinics  - CBC with platelets    RECOMMENDATION:  APPROVAL GIVEN to proceed with proposed procedure, without further diagnostic evaluation.    Signed Electronically by: Payam Beltre MD    Copy of this evaluation report is provided to requesting physician.    Preop [facility] Preop Guidelines    Revised Cardiac Risk Index

## 2021-01-19 NOTE — LETTER
[facility]  [address] [address]  Phone: [phone]   1/19/2021      Aurora Figueroa  [address] [place] [address]        Dear Ms. Aurora R Henry:    I am writing to inform you of the results of the laboratory tests you had done recently. Your complete blood count was normal.    Thank you for allowing me to participate in your care. If you have any further questions or problems, please contact me via our nurse line at [phone].         Sincerely,        Payam Beltre MD, MPH  Department of Internal Medicine  [facility]

## 2021-01-29 DIAGNOSIS — Z11.59 ENCOUNTER FOR SCREENING FOR OTHER VIRAL DISEASES: ICD-10-CM

## 2021-01-29 LAB
LABORATORY COMMENT REPORT: NORMAL
SARS-COV-2 RNA RESP QL NAA+PROBE: NEGATIVE
SARS-COV-2 RNA RESP QL NAA+PROBE: NORMAL
SPECIMEN SOURCE: NORMAL
SPECIMEN SOURCE: NORMAL

## 2021-01-29 PROCEDURE — U0003 INFECTIOUS AGENT DETECTION BY NUCLEIC ACID (DNA OR RNA); SEVERE ACUTE RESPIRATORY SYNDROME CORONAVIRUS 2 (SARS-COV-2) (CORONAVIRUS DISEASE [COVID-19]), AMPLIFIED PROBE TECHNIQUE, MAKING USE OF HIGH THROUGHPUT TECHNOLOGIES AS DESCRIBED BY CMS-2020-01-R: HCPCS | Performed by: ORTHOPAEDIC SURGERY

## 2021-01-29 PROCEDURE — 99207 PR NO CHARGE LOS: CPT

## 2021-01-29 PROCEDURE — U0005 INFEC AGEN DETEC AMPLI PROBE: HCPCS | Performed by: ORTHOPAEDIC SURGERY

## 2021-02-01 NOTE — PHARMACY-ADMISSION MEDICATION HISTORY
Pharmacy reviewed prior to admission med list from pre-admitting rn, MALENA Parham.        Prior to Admission medications    Medication Sig Last Dose Taking? Auth Provider   Cholecalciferol (VITAMIN D) 1000 UNITS capsule Take 1 capsule by mouth daily   Yes Reported, Patient   Cyanocobalamin (VITAMIN B 12 PO) Take 2,500 mcg by mouth daily sublingual  Yes Reported, Patient   ferrous sulfate (FEROSUL) 325 (65 Fe) MG tablet Take 325 mg by mouth twice a week   Yes Reported, Patient   levothyroxine (SYNTHROID/LEVOTHROID) 50 MCG tablet TAKE 1 TABLET BY MOUTH EVERY DAY  Yes Darlene Nicole PA-C   methylcellulose (CITRUCEL) powder Take 3 teaspoonful by mouth daily   Yes Reported, Patient   metoprolol succinate ER (TOPROL-XL) 25 MG 24 hr tablet Take 1 tablet (25 mg) by mouth daily  Yes Darlene Nicole PA-C   Multiple Vitamins-Minerals (ICAPS AREDS 2 PO) Take by mouth 2 times daily  Yes Reported, Patient   OMEPRAZOLE PO Take 20 mg by mouth daily   Yes Reported, Patient   Pediatric Multivit-Minerals-C (FLINTSTONES COMPLETE PO) Take 1 tablet by mouth daily   Yes Reported, Patient   simvastatin (ZOCOR) 40 MG tablet Take 1 tablet (40 mg) by mouth daily  Yes Darlene Nicole PA-C

## 2021-02-02 ENCOUNTER — ANESTHESIA EVENT (OUTPATIENT)
Dept: SURGERY | Facility: CLINIC | Age: 81
End: 2021-02-02
Payer: MEDICARE

## 2021-02-02 ENCOUNTER — APPOINTMENT (OUTPATIENT)
Dept: GENERAL RADIOLOGY | Facility: CLINIC | Age: 81
End: 2021-02-02
Attending: PHYSICIAN ASSISTANT
Payer: MEDICARE

## 2021-02-02 ENCOUNTER — HOSPITAL ENCOUNTER (OUTPATIENT)
Facility: CLINIC | Age: 81
Discharge: HOME | End: 2021-02-03
Attending: ORTHOPAEDIC SURGERY | Admitting: ORTHOPAEDIC SURGERY
Payer: MEDICARE

## 2021-02-02 ENCOUNTER — ANESTHESIA (OUTPATIENT)
Dept: SURGERY | Facility: CLINIC | Age: 81
End: 2021-02-02
Payer: MEDICARE

## 2021-02-02 DIAGNOSIS — M16.11 PRIMARY OSTEOARTHRITIS OF RIGHT HIP: ICD-10-CM

## 2021-02-02 DIAGNOSIS — Z96.641 S/P HIP REPLACEMENT, RIGHT: Primary | ICD-10-CM

## 2021-02-02 LAB
CREAT SERPL-MCNC: 0.72 MG/DL (ref 0.52–1.04)
CREAT SERPL-MCNC: 0.77 MG/DL (ref 0.52–1.04)
GFR SERPL CREATININE-BSD FRML MDRD: 74 ML/MIN/{1.73_M2}
GFR SERPL CREATININE-BSD FRML MDRD: 80 ML/MIN/{1.73_M2}
PLATELET # BLD AUTO: 126 10E9/L (ref 150–450)
POTASSIUM SERPL-SCNC: 3.7 MMOL/L (ref 3.4–5.3)

## 2021-02-02 PROCEDURE — 84132 ASSAY OF SERUM POTASSIUM: CPT | Performed by: ANESTHESIOLOGY

## 2021-02-02 PROCEDURE — 36415 COLL VENOUS BLD VENIPUNCTURE: CPT | Performed by: ANESTHESIOLOGY

## 2021-02-02 PROCEDURE — 272N000001 HC OR GENERAL SUPPLY STERILE: Performed by: ORTHOPAEDIC SURGERY

## 2021-02-02 PROCEDURE — 370N000017 HC ANESTHESIA TECHNICAL FEE, PER MIN: Performed by: ORTHOPAEDIC SURGERY

## 2021-02-02 PROCEDURE — 258N000001 HC RX 258: Performed by: ORTHOPAEDIC SURGERY

## 2021-02-02 PROCEDURE — 250N000011 HC RX IP 250 OP 636: Performed by: ANESTHESIOLOGY

## 2021-02-02 PROCEDURE — 27130 TOTAL HIP ARTHROPLASTY: CPT | Mod: RT | Performed by: ORTHOPAEDIC SURGERY

## 2021-02-02 PROCEDURE — 250N000011 HC RX IP 250 OP 636: Performed by: NURSE ANESTHETIST, CERTIFIED REGISTERED

## 2021-02-02 PROCEDURE — 82565 ASSAY OF CREATININE: CPT | Performed by: ANESTHESIOLOGY

## 2021-02-02 PROCEDURE — 36415 COLL VENOUS BLD VENIPUNCTURE: CPT | Performed by: ORTHOPAEDIC SURGERY

## 2021-02-02 PROCEDURE — 250N000013 HC RX MED GY IP 250 OP 250 PS 637: Performed by: PHYSICIAN ASSISTANT

## 2021-02-02 PROCEDURE — 250N000009 HC RX 250: Performed by: NURSE ANESTHETIST, CERTIFIED REGISTERED

## 2021-02-02 PROCEDURE — 36415 COLL VENOUS BLD VENIPUNCTURE: CPT | Performed by: PHYSICIAN ASSISTANT

## 2021-02-02 PROCEDURE — 27130 TOTAL HIP ARTHROPLASTY: CPT | Mod: AS | Performed by: PHYSICIAN ASSISTANT

## 2021-02-02 PROCEDURE — 999N000141 HC STATISTIC PRE-PROCEDURE NURSING ASSESSMENT: Performed by: ORTHOPAEDIC SURGERY

## 2021-02-02 PROCEDURE — 85049 AUTOMATED PLATELET COUNT: CPT | Performed by: ORTHOPAEDIC SURGERY

## 2021-02-02 PROCEDURE — 73552 X-RAY EXAM OF FEMUR 2/>: CPT | Mod: RT

## 2021-02-02 PROCEDURE — 360N000077 HC SURGERY LEVEL 4, PER MIN: Performed by: ORTHOPAEDIC SURGERY

## 2021-02-02 PROCEDURE — 250N000009 HC RX 250: Performed by: PHYSICIAN ASSISTANT

## 2021-02-02 PROCEDURE — 250N000011 HC RX IP 250 OP 636: Performed by: ORTHOPAEDIC SURGERY

## 2021-02-02 PROCEDURE — 258N000003 HC RX IP 258 OP 636: Performed by: ORTHOPAEDIC SURGERY

## 2021-02-02 PROCEDURE — 250N000009 HC RX 250: Performed by: ANESTHESIOLOGY

## 2021-02-02 PROCEDURE — 258N000003 HC RX IP 258 OP 636: Performed by: PHYSICIAN ASSISTANT

## 2021-02-02 PROCEDURE — C1776 JOINT DEVICE (IMPLANTABLE): HCPCS | Performed by: ORTHOPAEDIC SURGERY

## 2021-02-02 PROCEDURE — 250N000013 HC RX MED GY IP 250 OP 250 PS 637: Performed by: ORTHOPAEDIC SURGERY

## 2021-02-02 PROCEDURE — 258N000003 HC RX IP 258 OP 636: Performed by: ANESTHESIOLOGY

## 2021-02-02 PROCEDURE — 710N000009 HC RECOVERY PHASE 1, LEVEL 1, PER MIN: Performed by: ORTHOPAEDIC SURGERY

## 2021-02-02 PROCEDURE — 82565 ASSAY OF CREATININE: CPT | Performed by: PHYSICIAN ASSISTANT

## 2021-02-02 PROCEDURE — 258N000003 HC RX IP 258 OP 636: Performed by: NURSE ANESTHETIST, CERTIFIED REGISTERED

## 2021-02-02 DEVICE — IMPLANTABLE DEVICE: Type: IMPLANTABLE DEVICE | Site: HIP | Status: FUNCTIONAL

## 2021-02-02 RX ORDER — NALOXONE HYDROCHLORIDE 0.4 MG/ML
0.4 INJECTION, SOLUTION INTRAMUSCULAR; INTRAVENOUS; SUBCUTANEOUS
Status: DISCONTINUED | OUTPATIENT
Start: 2021-02-02 | End: 2021-02-03 | Stop reason: HOSPADM

## 2021-02-02 RX ORDER — DEXAMETHASONE SODIUM PHOSPHATE 4 MG/ML
INJECTION, SOLUTION INTRA-ARTICULAR; INTRALESIONAL; INTRAMUSCULAR; INTRAVENOUS; SOFT TISSUE PRN
Status: DISCONTINUED | OUTPATIENT
Start: 2021-02-02 | End: 2021-02-02

## 2021-02-02 RX ORDER — NALOXONE HYDROCHLORIDE 0.4 MG/ML
0.2 INJECTION, SOLUTION INTRAMUSCULAR; INTRAVENOUS; SUBCUTANEOUS
Status: DISCONTINUED | OUTPATIENT
Start: 2021-02-02 | End: 2021-02-02 | Stop reason: HOSPADM

## 2021-02-02 RX ORDER — ACETAMINOPHEN 325 MG/1
975 TABLET ORAL EVERY 8 HOURS
Status: DISCONTINUED | OUTPATIENT
Start: 2021-02-02 | End: 2021-02-03 | Stop reason: HOSPADM

## 2021-02-02 RX ORDER — METOPROLOL SUCCINATE 25 MG/1
25 TABLET, EXTENDED RELEASE ORAL DAILY
Status: DISCONTINUED | OUTPATIENT
Start: 2021-02-03 | End: 2021-02-03 | Stop reason: HOSPADM

## 2021-02-02 RX ORDER — LIDOCAINE 40 MG/G
CREAM TOPICAL
Status: DISCONTINUED | OUTPATIENT
Start: 2021-02-02 | End: 2021-02-03 | Stop reason: HOSPADM

## 2021-02-02 RX ORDER — PROPOFOL 10 MG/ML
INJECTION, EMULSION INTRAVENOUS CONTINUOUS PRN
Status: DISCONTINUED | OUTPATIENT
Start: 2021-02-02 | End: 2021-02-02

## 2021-02-02 RX ORDER — HYDROMORPHONE HYDROCHLORIDE 1 MG/ML
0.2 INJECTION, SOLUTION INTRAMUSCULAR; INTRAVENOUS; SUBCUTANEOUS
Status: DISCONTINUED | OUTPATIENT
Start: 2021-02-02 | End: 2021-02-03 | Stop reason: HOSPADM

## 2021-02-02 RX ORDER — SODIUM CHLORIDE, SODIUM LACTATE, POTASSIUM CHLORIDE, CALCIUM CHLORIDE 600; 310; 30; 20 MG/100ML; MG/100ML; MG/100ML; MG/100ML
INJECTION, SOLUTION INTRAVENOUS CONTINUOUS
Status: DISCONTINUED | OUTPATIENT
Start: 2021-02-02 | End: 2021-02-03 | Stop reason: HOSPADM

## 2021-02-02 RX ORDER — ONDANSETRON 2 MG/ML
4 INJECTION INTRAMUSCULAR; INTRAVENOUS EVERY 30 MIN PRN
Status: DISCONTINUED | OUTPATIENT
Start: 2021-02-02 | End: 2021-02-02 | Stop reason: HOSPADM

## 2021-02-02 RX ORDER — LIDOCAINE 40 MG/G
CREAM TOPICAL
Status: DISCONTINUED | OUTPATIENT
Start: 2021-02-02 | End: 2021-02-02 | Stop reason: HOSPADM

## 2021-02-02 RX ORDER — CLINDAMYCIN PHOSPHATE 900 MG/50ML
900 INJECTION, SOLUTION INTRAVENOUS EVERY 8 HOURS
Status: COMPLETED | OUTPATIENT
Start: 2021-02-02 | End: 2021-02-03

## 2021-02-02 RX ORDER — NALOXONE HYDROCHLORIDE 0.4 MG/ML
0.2 INJECTION, SOLUTION INTRAMUSCULAR; INTRAVENOUS; SUBCUTANEOUS
Status: DISCONTINUED | OUTPATIENT
Start: 2021-02-02 | End: 2021-02-03 | Stop reason: HOSPADM

## 2021-02-02 RX ORDER — EPHEDRINE SULFATE 50 MG/ML
INJECTION, SOLUTION INTRAMUSCULAR; INTRAVENOUS; SUBCUTANEOUS PRN
Status: DISCONTINUED | OUTPATIENT
Start: 2021-02-02 | End: 2021-02-02

## 2021-02-02 RX ORDER — NALOXONE HYDROCHLORIDE 0.4 MG/ML
0.4 INJECTION, SOLUTION INTRAMUSCULAR; INTRAVENOUS; SUBCUTANEOUS
Status: DISCONTINUED | OUTPATIENT
Start: 2021-02-02 | End: 2021-02-02 | Stop reason: HOSPADM

## 2021-02-02 RX ORDER — LEVOTHYROXINE SODIUM 50 UG/1
50 TABLET ORAL DAILY
Status: DISCONTINUED | OUTPATIENT
Start: 2021-02-02 | End: 2021-02-03 | Stop reason: HOSPADM

## 2021-02-02 RX ORDER — KETAMINE HYDROCHLORIDE 10 MG/ML
INJECTION INTRAMUSCULAR; INTRAVENOUS PRN
Status: DISCONTINUED | OUTPATIENT
Start: 2021-02-02 | End: 2021-02-02

## 2021-02-02 RX ORDER — LIDOCAINE HYDROCHLORIDE 10 MG/ML
INJECTION, SOLUTION INFILTRATION; PERINEURAL PRN
Status: DISCONTINUED | OUTPATIENT
Start: 2021-02-02 | End: 2021-02-02

## 2021-02-02 RX ORDER — TRANEXAMIC ACID 650 MG/1
1950 TABLET ORAL ONCE
Status: COMPLETED | OUTPATIENT
Start: 2021-02-02 | End: 2021-02-02

## 2021-02-02 RX ORDER — ACETAMINOPHEN 325 MG/1
975 TABLET ORAL ONCE
Status: COMPLETED | OUTPATIENT
Start: 2021-02-02 | End: 2021-02-02

## 2021-02-02 RX ORDER — HYDROMORPHONE HYDROCHLORIDE 1 MG/ML
0.4 INJECTION, SOLUTION INTRAMUSCULAR; INTRAVENOUS; SUBCUTANEOUS
Status: DISCONTINUED | OUTPATIENT
Start: 2021-02-02 | End: 2021-02-03 | Stop reason: HOSPADM

## 2021-02-02 RX ORDER — PROPOFOL 10 MG/ML
INJECTION, EMULSION INTRAVENOUS PRN
Status: DISCONTINUED | OUTPATIENT
Start: 2021-02-02 | End: 2021-02-02

## 2021-02-02 RX ORDER — SIMVASTATIN 40 MG
40 TABLET ORAL DAILY
Status: DISCONTINUED | OUTPATIENT
Start: 2021-02-02 | End: 2021-02-03 | Stop reason: HOSPADM

## 2021-02-02 RX ORDER — SODIUM CHLORIDE, SODIUM LACTATE, POTASSIUM CHLORIDE, CALCIUM CHLORIDE 600; 310; 30; 20 MG/100ML; MG/100ML; MG/100ML; MG/100ML
INJECTION, SOLUTION INTRAVENOUS CONTINUOUS
Status: DISCONTINUED | OUTPATIENT
Start: 2021-02-02 | End: 2021-02-02 | Stop reason: HOSPADM

## 2021-02-02 RX ORDER — LABETALOL HYDROCHLORIDE 5 MG/ML
INJECTION, SOLUTION INTRAVENOUS PRN
Status: DISCONTINUED | OUTPATIENT
Start: 2021-02-02 | End: 2021-02-02

## 2021-02-02 RX ORDER — FENTANYL CITRATE 50 UG/ML
25-50 INJECTION, SOLUTION INTRAMUSCULAR; INTRAVENOUS
Status: DISCONTINUED | OUTPATIENT
Start: 2021-02-02 | End: 2021-02-02 | Stop reason: HOSPADM

## 2021-02-02 RX ORDER — AMOXICILLIN 250 MG
1 CAPSULE ORAL 2 TIMES DAILY
Status: DISCONTINUED | OUTPATIENT
Start: 2021-02-02 | End: 2021-02-03 | Stop reason: HOSPADM

## 2021-02-02 RX ORDER — CEFAZOLIN SODIUM 2 G/100ML
2 INJECTION, SOLUTION INTRAVENOUS
Status: DISCONTINUED | OUTPATIENT
Start: 2021-02-02 | End: 2021-02-02 | Stop reason: HOSPADM

## 2021-02-02 RX ORDER — BISACODYL 10 MG
10 SUPPOSITORY, RECTAL RECTAL DAILY PRN
Status: DISCONTINUED | OUTPATIENT
Start: 2021-02-02 | End: 2021-02-03 | Stop reason: HOSPADM

## 2021-02-02 RX ORDER — HYDROMORPHONE HYDROCHLORIDE 4 MG/1
4 TABLET ORAL EVERY 4 HOURS PRN
Status: DISCONTINUED | OUTPATIENT
Start: 2021-02-02 | End: 2021-02-03 | Stop reason: HOSPADM

## 2021-02-02 RX ORDER — HYDROMORPHONE HYDROCHLORIDE 2 MG/1
2 TABLET ORAL EVERY 4 HOURS PRN
Status: DISCONTINUED | OUTPATIENT
Start: 2021-02-02 | End: 2021-02-03 | Stop reason: HOSPADM

## 2021-02-02 RX ORDER — ONDANSETRON 2 MG/ML
INJECTION INTRAMUSCULAR; INTRAVENOUS PRN
Status: DISCONTINUED | OUTPATIENT
Start: 2021-02-02 | End: 2021-02-02

## 2021-02-02 RX ORDER — ONDANSETRON 4 MG/1
4 TABLET, ORALLY DISINTEGRATING ORAL EVERY 6 HOURS PRN
Status: DISCONTINUED | OUTPATIENT
Start: 2021-02-02 | End: 2021-02-03 | Stop reason: HOSPADM

## 2021-02-02 RX ORDER — POLYETHYLENE GLYCOL 3350 17 G/17G
17 POWDER, FOR SOLUTION ORAL DAILY
Status: DISCONTINUED | OUTPATIENT
Start: 2021-02-03 | End: 2021-02-03 | Stop reason: HOSPADM

## 2021-02-02 RX ORDER — LABETALOL 20 MG/4 ML (5 MG/ML) INTRAVENOUS SYRINGE
10
Status: DISCONTINUED | OUTPATIENT
Start: 2021-02-02 | End: 2021-02-02 | Stop reason: HOSPADM

## 2021-02-02 RX ORDER — ONDANSETRON 2 MG/ML
4 INJECTION INTRAMUSCULAR; INTRAVENOUS EVERY 6 HOURS PRN
Status: DISCONTINUED | OUTPATIENT
Start: 2021-02-02 | End: 2021-02-03 | Stop reason: HOSPADM

## 2021-02-02 RX ORDER — FENTANYL CITRATE 50 UG/ML
INJECTION, SOLUTION INTRAMUSCULAR; INTRAVENOUS PRN
Status: DISCONTINUED | OUTPATIENT
Start: 2021-02-02 | End: 2021-02-02

## 2021-02-02 RX ORDER — PROMETHAZINE HYDROCHLORIDE 25 MG/ML
25 INJECTION INTRAMUSCULAR; INTRAVENOUS ONCE
Status: COMPLETED | OUTPATIENT
Start: 2021-02-02 | End: 2021-02-02

## 2021-02-02 RX ORDER — PROCHLORPERAZINE MALEATE 5 MG
5 TABLET ORAL EVERY 6 HOURS PRN
Status: DISCONTINUED | OUTPATIENT
Start: 2021-02-02 | End: 2021-02-03 | Stop reason: HOSPADM

## 2021-02-02 RX ORDER — DOCUSATE SODIUM 100 MG/1
100 CAPSULE, LIQUID FILLED ORAL 2 TIMES DAILY
Status: DISCONTINUED | OUTPATIENT
Start: 2021-02-02 | End: 2021-02-03 | Stop reason: HOSPADM

## 2021-02-02 RX ORDER — GLYCOPYRROLATE 0.2 MG/ML
INJECTION, SOLUTION INTRAMUSCULAR; INTRAVENOUS PRN
Status: DISCONTINUED | OUTPATIENT
Start: 2021-02-02 | End: 2021-02-02

## 2021-02-02 RX ORDER — EPHEDRINE SULFATE 50 MG/ML
25 INJECTION, SOLUTION INTRAVENOUS ONCE
Status: COMPLETED | OUTPATIENT
Start: 2021-02-02 | End: 2021-02-02

## 2021-02-02 RX ORDER — MEPERIDINE HYDROCHLORIDE 25 MG/ML
12.5 INJECTION INTRAMUSCULAR; INTRAVENOUS; SUBCUTANEOUS
Status: DISCONTINUED | OUTPATIENT
Start: 2021-02-02 | End: 2021-02-02 | Stop reason: HOSPADM

## 2021-02-02 RX ORDER — ACETAMINOPHEN 325 MG/1
650 TABLET ORAL EVERY 4 HOURS PRN
Status: DISCONTINUED | OUTPATIENT
Start: 2021-02-05 | End: 2021-02-03 | Stop reason: HOSPADM

## 2021-02-02 RX ORDER — HYDROMORPHONE HYDROCHLORIDE 1 MG/ML
.3-.5 INJECTION, SOLUTION INTRAMUSCULAR; INTRAVENOUS; SUBCUTANEOUS EVERY 10 MIN PRN
Status: DISCONTINUED | OUTPATIENT
Start: 2021-02-02 | End: 2021-02-02 | Stop reason: HOSPADM

## 2021-02-02 RX ORDER — CEFAZOLIN SODIUM 1 G/3ML
1 INJECTION, POWDER, FOR SOLUTION INTRAMUSCULAR; INTRAVENOUS SEE ADMIN INSTRUCTIONS
Status: DISCONTINUED | OUTPATIENT
Start: 2021-02-02 | End: 2021-02-02 | Stop reason: HOSPADM

## 2021-02-02 RX ORDER — ONDANSETRON 4 MG/1
4 TABLET, ORALLY DISINTEGRATING ORAL EVERY 30 MIN PRN
Status: DISCONTINUED | OUTPATIENT
Start: 2021-02-02 | End: 2021-02-02 | Stop reason: HOSPADM

## 2021-02-02 RX ORDER — DIPHENHYDRAMINE HCL 12.5MG/5ML
12.5 LIQUID (ML) ORAL EVERY 6 HOURS PRN
Status: DISCONTINUED | OUTPATIENT
Start: 2021-02-02 | End: 2021-02-03 | Stop reason: HOSPADM

## 2021-02-02 RX ORDER — NEOSTIGMINE METHYLSULFATE 1 MG/ML
VIAL (ML) INJECTION PRN
Status: DISCONTINUED | OUTPATIENT
Start: 2021-02-02 | End: 2021-02-02

## 2021-02-02 RX ADMIN — HYDROMORPHONE HYDROCHLORIDE 0.2 MG: 1 INJECTION, SOLUTION INTRAMUSCULAR; INTRAVENOUS; SUBCUTANEOUS at 13:26

## 2021-02-02 RX ADMIN — SIMVASTATIN 40 MG: 40 TABLET, FILM COATED ORAL at 20:33

## 2021-02-02 RX ADMIN — ACETAMINOPHEN 975 MG: 325 TABLET, FILM COATED ORAL at 17:37

## 2021-02-02 RX ADMIN — GLYCOPYRROLATE 0.2 MG: 0.2 INJECTION, SOLUTION INTRAMUSCULAR; INTRAVENOUS at 12:02

## 2021-02-02 RX ADMIN — ONDANSETRON 4 MG: 2 INJECTION INTRAMUSCULAR; INTRAVENOUS at 14:41

## 2021-02-02 RX ADMIN — LABETALOL HYDROCHLORIDE 2.5 MG: 5 INJECTION INTRAVENOUS at 12:36

## 2021-02-02 RX ADMIN — ONDANSETRON HYDROCHLORIDE 4 MG: 2 INJECTION, SOLUTION INTRAVENOUS at 12:02

## 2021-02-02 RX ADMIN — Medication 5 MG: at 13:02

## 2021-02-02 RX ADMIN — PSYLLIUM HUSK 1 PACKET: 3.4 POWDER ORAL at 17:35

## 2021-02-02 RX ADMIN — FENTANYL CITRATE 50 MCG: 50 INJECTION, SOLUTION INTRAMUSCULAR; INTRAVENOUS at 14:11

## 2021-02-02 RX ADMIN — EPHEDRINE SULFATE 25 MG: 50 INJECTION INTRAVENOUS at 14:56

## 2021-02-02 RX ADMIN — PHENYLEPHRINE HYDROCHLORIDE 50 MCG: 10 INJECTION INTRAVENOUS at 13:10

## 2021-02-02 RX ADMIN — HYDROMORPHONE HYDROCHLORIDE 2 MG: 2 TABLET ORAL at 20:33

## 2021-02-02 RX ADMIN — LIDOCAINE HYDROCHLORIDE 50 MG: 10 INJECTION, SOLUTION INFILTRATION; PERINEURAL at 12:02

## 2021-02-02 RX ADMIN — FENTANYL CITRATE 50 MCG: 50 INJECTION, SOLUTION INTRAMUSCULAR; INTRAVENOUS at 14:33

## 2021-02-02 RX ADMIN — Medication 4 MG: at 13:19

## 2021-02-02 RX ADMIN — Medication 5 MG: at 13:04

## 2021-02-02 RX ADMIN — CLINDAMYCIN IN 5 PERCENT DEXTROSE 900 MG: 18 INJECTION, SOLUTION INTRAVENOUS at 16:59

## 2021-02-02 RX ADMIN — SODIUM CHLORIDE, POTASSIUM CHLORIDE, SODIUM LACTATE AND CALCIUM CHLORIDE: 600; 310; 30; 20 INJECTION, SOLUTION INTRAVENOUS at 12:52

## 2021-02-02 RX ADMIN — GLYCOPYRROLATE 0.4 MG: 0.2 INJECTION, SOLUTION INTRAMUSCULAR; INTRAVENOUS at 13:18

## 2021-02-02 RX ADMIN — Medication 5 MG: at 12:56

## 2021-02-02 RX ADMIN — OMEPRAZOLE 20 MG: 20 CAPSULE, DELAYED RELEASE ORAL at 16:54

## 2021-02-02 RX ADMIN — ROCURONIUM BROMIDE 35 MG: 10 INJECTION INTRAVENOUS at 12:02

## 2021-02-02 RX ADMIN — TRANEXAMIC ACID 1950 MG: 650 TABLET ORAL at 10:03

## 2021-02-02 RX ADMIN — DOCUSATE SODIUM 50 MG AND SENNOSIDES 8.6 MG 1 TABLET: 8.6; 5 TABLET, FILM COATED ORAL at 20:33

## 2021-02-02 RX ADMIN — Medication 20 MG: at 12:15

## 2021-02-02 RX ADMIN — PROMETHAZINE HYDROCHLORIDE 25 MG: 25 INJECTION INTRAMUSCULAR; INTRAVENOUS at 14:56

## 2021-02-02 RX ADMIN — SODIUM CHLORIDE, POTASSIUM CHLORIDE, SODIUM LACTATE AND CALCIUM CHLORIDE: 600; 310; 30; 20 INJECTION, SOLUTION INTRAVENOUS at 11:28

## 2021-02-02 RX ADMIN — FENTANYL CITRATE 100 MCG: 50 INJECTION, SOLUTION INTRAMUSCULAR; INTRAVENOUS at 12:02

## 2021-02-02 RX ADMIN — ACETAMINOPHEN 975 MG: 325 TABLET, FILM COATED ORAL at 10:03

## 2021-02-02 RX ADMIN — LEVOTHYROXINE SODIUM 50 MCG: 50 TABLET ORAL at 16:54

## 2021-02-02 RX ADMIN — PROPOFOL 20 MCG/KG/MIN: 10 INJECTION, EMULSION INTRAVENOUS at 12:09

## 2021-02-02 RX ADMIN — HYDROMORPHONE HYDROCHLORIDE 0.5 MG: 1 INJECTION, SOLUTION INTRAMUSCULAR; INTRAVENOUS; SUBCUTANEOUS at 12:28

## 2021-02-02 RX ADMIN — Medication 2500 MCG: at 16:54

## 2021-02-02 RX ADMIN — DEXAMETHASONE SODIUM PHOSPHATE 4 MG: 4 INJECTION, SOLUTION INTRA-ARTICULAR; INTRALESIONAL; INTRAMUSCULAR; INTRAVENOUS; SOFT TISSUE at 12:02

## 2021-02-02 RX ADMIN — DEXMEDETOMIDINE HYDROCHLORIDE 0.5 MCG/KG/HR: 100 INJECTION, SOLUTION INTRAVENOUS at 12:09

## 2021-02-02 RX ADMIN — Medication 7.5 MG: at 12:52

## 2021-02-02 RX ADMIN — PROPOFOL 150 MG: 10 INJECTION, EMULSION INTRAVENOUS at 12:02

## 2021-02-02 RX ADMIN — DOCUSATE SODIUM 100 MG: 100 CAPSULE, LIQUID FILLED ORAL at 20:33

## 2021-02-02 RX ADMIN — HYDROMORPHONE HYDROCHLORIDE 2 MG: 2 TABLET ORAL at 16:54

## 2021-02-02 RX ADMIN — HYDROMORPHONE HYDROCHLORIDE 0.3 MG: 1 INJECTION, SOLUTION INTRAMUSCULAR; INTRAVENOUS; SUBCUTANEOUS at 13:14

## 2021-02-02 RX ADMIN — SODIUM CHLORIDE, POTASSIUM CHLORIDE, SODIUM LACTATE AND CALCIUM CHLORIDE: 600; 310; 30; 20 INJECTION, SOLUTION INTRAVENOUS at 16:54

## 2021-02-02 RX ADMIN — HYDROMORPHONE HYDROCHLORIDE 0.5 MG: 1 INJECTION, SOLUTION INTRAMUSCULAR; INTRAVENOUS; SUBCUTANEOUS at 12:21

## 2021-02-02 ASSESSMENT — ENCOUNTER SYMPTOMS
DYSRHYTHMIAS: 0
SEIZURES: 0

## 2021-02-02 ASSESSMENT — MIFFLIN-ST. JEOR: SCORE: 1351.32

## 2021-02-02 ASSESSMENT — COPD QUESTIONNAIRES: COPD: 0

## 2021-02-02 ASSESSMENT — LIFESTYLE VARIABLES: TOBACCO_USE: 0

## 2021-02-02 NOTE — OP NOTE
"February 2, 2021    Pre Operative Diagnosis: Right hip DJD  Post Operative Diagnosis: Right hip DJD  Title of the Operation: Right Total hip arthroplasty (Niño and Nephew 52 mm acetabulum;; #6 Anthology; +0 neck 32 mm ceramic head )  Anesthesia: general  Surgeon: Robbie Gamble MD  Assistant: FANTASMA Wilhelm    Assistance from the PA was necessary for this case due to the complexity of the procedure. PA helped with satisfactory exposure of the bones, protecting vital neurovascular and ligamentous structures. He also helped with maintaining the instruments for bone osteotomy and subsequent implantation of the components. He also performed wound closure and placement of postoperative dressing.    Drain: none    Indication: This patient is a 81 y.o. female who has had chronic hip pain due to arthritis. Pain is persisting despite all reasonable and appropriate treatments. The pre-operative x-rays confirmed the clinical diagnosis of arthritis. Because of progressively worsening pain that is affecting his day-to-day life significantly, the operation of total hip replacement is chosen. A thorough discussion regarding options, potential complications including but not exclusive of infection, leg length inequality, fermin-prosthetic fracture, deep vein thrombosis, anesthetic risks including death, etc was carried out.    Description of the Operation:  After satisfactory anesthesia was administered, the patient was then turned to the lateral decubitus position exposing the correct hip. A routine sterile prepping and draping was performed.  The standard \"time out\" was then carried out per protocol. At this point, administration preoperative antibiotic was confirmed.  A standard posterior approach to the hip was carried out. The incision was made over the greater trochanter in a slightly curved fashion. Through a sharp skin dissection, the fascia over the gluteus angela and IT band were identified.With placement of the east/west " retractor, the short external rotator tendons were identified. The short external rotator tendons and the posterior capsule were then released from the proximal femur and the acetabulum respectively with care taken to protect the sciatic nerve. The hip joint is now exposed. The short external rotator tendons were tagged for future repair at the time of closure.  Subsequently, the femoral head was dislocated from the acetabulum. The findings at this point was felt to be consistent with advanced degenerative joint disease of the hip joint. Using the femoral neck cutting guide, the femoral neck osteotomy was then carried out according to the preoperative planning.  The acetabulum was then exposed with retraction of the femur anterior ly. With clear visualization, the acetabulum was then reamed in a sequential fashion until subchondral bleeding bone was visualized. The appropriate acetabular component was then impacted into an anatomic position which included 25-30 degrees of anteversion and 40-45 of abduction. The component was noted to be seated tightly to the point that no adjunctive screw fixation was felt to be necessary. At this point the acetabular liner was placed using the neutral liner.     At this point the attention was then paid to the femoral side. The intramedullary canal was identified by placing a canal finder. A sequential reaming of the intramedullary canal was then carried out until we felt that the reamer was tight within the canal. Next, a sequential broaching was carried out until the broach did not cause pistonNing or rotational movement. Next, the trial femoral neck and head components were placed on top of the stem. With the reduction of the hip joint,  leg length as well as range of motion were checked. Once we found optimal leg length allowing full flexion beyond 90 degrees with the knee adducted to the other knee, no dislocation with 30 degrees of internal rotation and full flexion as well  as 5 mm push/pull. Subsequently, we placed the actual femoral stem into the femoral canal.  The femoral head component was subsequently impacted onto the stem and the hip joint was once again reduced. With re-confirmation of satisfactory leg length and stability, the wound was irrigated copiously. The previously tagged external rotator tendons were then repaired using 2 drill holes made into the greater trochanter. The rest of the wound was closed in layers in a routine fashion along with final skin closure done with staples. Soft dressing was applied.     The needle and sponge counts were  correct. And there were no identified intraoperative complications .The blood loss from this operation is as listed on the anesthesia record.    Robbie Gamble M.D.

## 2021-02-02 NOTE — ANESTHESIA POSTPROCEDURE EVALUATION
Patient: Aurora Figueroa    Procedure(s):  Right total hip arthroplasty    Diagnosis:Primary osteoarthritis of right hip [M16.11]  Diagnosis Additional Information: No value filed.    Anesthesia Type:  General    Note:  Disposition: Inpatient   Postop Pain Control: Uneventful            Sign Out: Well controlled pain   PONV:    Neuro/Psych: Uneventful            Sign Out: Acceptable/Baseline neuro status   Airway/Respiratory: Uneventful            Sign Out: AIRWAY IN SITU/Resp. Support   CV/Hemodynamics: Uneventful            Sign Out: Acceptable CV status   Other NRE:    DID A NON-ROUTINE EVENT OCCUR?          Last vitals:  Vitals:    02/02/21 1400 02/02/21 1415 02/02/21 1430   BP: 107/46 117/50 126/58   Pulse: 61 (!) 43 54   Resp: 13 12 11   Temp:      SpO2: 100% 97% 97%       Electronically Signed By: Neo Bright MD  February 2, 2021  2:37 PM

## 2021-02-02 NOTE — ANESTHESIA PROCEDURE NOTES
Airway   Date/Time: 2/2/2021 12:05 PM   Patient location during procedure: OR  Staff -   CRNA: Nataly You APRN CRNA  Performed By: CRNA    Consent for Airway   Urgency: elective    Indications and Patient Condition  Indications for airway management: fermin-procedural  Induction type:intravenousMask difficulty assessment: 1 - vent by mask    Final Airway Details  Final airway type: endotracheal airway  Successful airway:ETT - single and Oral  Endotracheal Airway Details   ETT size (mm): 7.0  Cuffed: yes  Successful intubation technique: direct laryngoscopy  Grade View of Cords: 2 (left of midline)  Adjucts: stylet  Measured from: gums/teeth  Secured at (cm): 22  Secured with: plastic tape  Bite block used: Soft    Post intubation assessment   Placement verified by: capnometry, equal breath sounds and chest rise   Number of attempts at approach: 1  Number of other approaches attempted: 0  Secured with:plastic tape  Ease of procedure: easy  Dentition: Intact and Unchanged

## 2021-02-02 NOTE — ANESTHESIA PREPROCEDURE EVALUATION
Anesthesia Pre-Procedure Evaluation    Patient: Aurora Figueroa   MRN: [id number] : 1940        Preoperative Diagnosis: Primary osteoarthritis of right hip [M16.11]   Procedure : Procedure(s):  Right total hip arthroplasty     Past Medical History:   Diagnosis Date     Arrhythmia     tachycardia intermittant (metoprolol)     Basal cell carcinoma      Gastroesophageal reflux disease      Hyperlipidemia      Hypertension      Hypothyroidism      LUMBOSACRAL NEURITIS NOS 2007     Pemphigus 2015     Sleep apnea     does not wear CPAP (resolved after gastric bypass surgery)     Squamous cell carcinoma      Tachycardia       Past Surgical History:   Procedure Laterality Date     ABDOMEN SURGERY  2009    gastric bypass 2009     APPENDECTOMY  1970    1970     ARTHROPLASTY REVISION KNEE Right 2016    Procedure: ARTHROPLASTY REVISION KNEE;  Surgeon: Vincent Allen MD;  Location:  OR     COLONOSCOPY  2014    Procedure: COLONOSCOPY;  Surgeon: Jamari Jj MD;  Location:  GI     ENT SURGERY      tosillectomy      EYE SURGERY      macular repair  left eye     EYE SURGERY      cataract removal left eye.     GYN SURGERY      c section     HERNIA REPAIR      umbilical      ORTHOPEDIC SURGERY  ,     arthroscopic both knees     ORTHOPEDIC SURGERY      Bilateral TKA     ORTHOPEDIC SURGERY      Left hip replacement.     SOFT TISSUE SURGERY      basal cell carcinoma removed from face, several spots      Allergies   Allergen Reactions     Food Itching     Raw fruit & veg:  Apples, cherries, cantalope, tomatoes,  carrotts.  Eye & throat irritations.     Penicillins Hives     Fruit [Cherry]      Hives, itchy throat tomatoes     Nsaids Other (See Comments)     Patient had gastric bypass surgery.  Should not take oral  NSAID's ever.     Animal Dander      Pruritis,itchi eyes, throat and ears.     E.E.S. GI Disturbance     Erythromycin GI Disturbance      Pollen Extract Itching      Social History     Tobacco Use     Smoking status: Never Smoker     Smokeless tobacco: Never Used   Substance Use Topics     Alcohol use: Yes     Alcohol/week: 0.0 standard drinks     Comment: rare      Wt Readings from Last 1 Encounters:   02/02/21 87.5 kg (193 lb)        Anesthesia Evaluation   Pt has had prior anesthetic. Type: General.    No history of anesthetic complications       ROS/MED HX  ENT/Pulmonary:  - neg pulmonary ROS   (+) sleep apnea (resolved with weight loss), doesn't use CPAP,  (-) tobacco use, asthma, COPD and recent URI   Neurologic:  - neg neurologic ROS  (-) no seizures and no CVA   Cardiovascular:     (+) Dyslipidemia hypertension----- (-) CAD, arrhythmias and valvular problems/murmurs   METS/Exercise Tolerance:     Hematologic:  - neg hematologic  ROS     Musculoskeletal:   (+) arthritis,     GI/Hepatic:     (+) GERD, Asymptomatic on medication,     Renal/Genitourinary:  - neg Renal ROS     Endo:     (+) thyroid problem, hypothyroidism, Obesity,  (-) Type I DM, Type II DM and chronic steroid usage   Psychiatric/Substance Use:  - neg psychiatric ROS     Infectious Disease:  - neg infectious disease ROS     Malignancy:   (+) Malignancy, History of Skin.    Other: Comment: pemphigus           Physical Exam    Airway  airway exam normal      Mallampati: II   TM distance: > 3 FB   Neck ROM: full     Respiratory Devices and Support         Dental  no notable dental history         Cardiovascular   cardiovascular exam normal          Pulmonary   pulmonary exam normal                OUTSIDE LABS:  CBC:   Lab Results   Component Value Date    WBC 7.6 01/19/2021    WBC 5.8 11/09/2020    HGB 13.7 01/19/2021    HGB 13.3 11/09/2020    HCT 43.0 01/19/2021    HCT 40.2 11/09/2020     01/19/2021     (L) 11/09/2020     BMP:   Lab Results   Component Value Date     11/09/2020     05/08/2019    POTASSIUM 3.8 11/09/2020    POTASSIUM 4.0 05/08/2019     CHLORIDE 108 11/09/2020    CHLORIDE 108 05/08/2019    CO2 26 11/09/2020    CO2 26 05/08/2019    BUN 23 11/09/2020    BUN 23 05/08/2019    CR 0.74 11/09/2020    CR 0.88 05/08/2019     (H) 11/09/2020     (H) 05/08/2019     COAGS:   Lab Results   Component Value Date    PTT 31 01/14/2013    INR 1.03 12/20/2015     POC:   Lab Results   Component Value Date     (H) 02/05/2016     HEPATIC:   Lab Results   Component Value Date    ALBUMIN 3.9 11/09/2020    PROTTOTAL 7.4 11/09/2020    ALT 35 11/09/2020    AST 30 11/09/2020    ALKPHOS 80 11/09/2020    BILITOTAL 0.5 11/09/2020     OTHER:   Lab Results   Component Value Date    A1C 5.8 (H) 11/09/2020    TRAVIS 9.2 11/09/2020    PHOS 3.8 06/29/2011    LIPASE 148 11/09/2020    AMYLASE 59 11/09/2020    TSH 2.37 11/09/2020    T4 1.20 10/11/2018    CRP <2.9 01/21/2016    SED 18 01/21/2016       Anesthesia Plan     History & Physical Review      ASA Status:  3. NPO Status:  NPO Appropriate. .  Plan for General with Intravenous induction. Device: ETT Maintenance will be Balanced.         PONV prophylaxis:  Ondansetron (or other 5HT-3) and Dexamethasone or Solumedrol.       Consents  Anesthesia Plan(s) and associated risks, benefits, and realistic alternatives discussed.    Questions answered and patient/representative(s) expressed understanding.    Discussed with:  Patient.             Postoperative Care  Postoperative pain management: IV analgesics.           Neo Bright MD

## 2021-02-02 NOTE — ANESTHESIA CARE TRANSFER NOTE
Patient: Aurora Figueroa    Procedure(s):  Right total hip arthroplasty    Diagnosis: Primary osteoarthritis of right hip [M16.11]  Diagnosis Additional Information: No value filed.    Anesthesia Type:   General     Note:    Oropharynx: oropharynx clear of all foreign objects  Level of Consciousness: drowsy  Oxygen Supplementation: face mask    Independent Airway: airway patency satisfactory and stable  Dentition: dentition unchanged    Report to RN Given: handoff report given  Patient transferred to: PACU  Comments:   Spontaneous respirations, oral suctioned, bilateral eye opening and hand grasps.  Extubated to FM O2 6lpm.  VSS to PACU.  Handoff Report: Identifed the Patient, Identified the Reponsible Provider, Reviewed the pertinent medical history, Discussed the surgical course, Reviewed Intra-OP anesthesia mangement and issues during anesthesia, Set expectations for post-procedure period and Allowed opportunity for questions and acknowledgement of understanding      Vitals: (Last set prior to Anesthesia Care Transfer)  CRNA VITALS  2/2/2021 1304 - 2/2/2021 1341      2/2/2021             NIBP:  108/52    SpO2:  98 %    EKG:  Sinus rhythm        Electronically Signed By: BETTY Peña CRNA  February 2, 2021  1:41 PM

## 2021-02-02 NOTE — BRIEF OP NOTE
[facility]    Brief Operative Note    Pre-operative diagnosis: Primary osteoarthritis of right hip [M16.11]  Post-operative diagnosis right hip DJD    Procedure: Procedure(s):  Right total hip arthroplasty  Surgeon: Surgeon(s) and Role:     * Robbie Gamble MD - Primary     * Timothy Hare PA-C - Assisting  Anesthesia: General   Estimated blood loss: Less than 100 ml  Drains: None  Specimens: * No specimens in log *  Findings:   None.  Complications: None.  Implants:   Implant Name Type Inv. Item Serial No.  Lot No. LRB No. Used Action   R3 XLPE, 0 DEG, Acetabular Liner, 36mm ID, 52mm OD    [employer] [id number] Right 1 Implanted   R3 Three Hole Hemispherical Stiktite Coated Shell, 52mm OD    [employer] [id number] Right 1 Implanted   Anthology High Porous Femoral Component, CHARLA 6, High Offset    [employer] [id number] Right 1 Implanted   Biolox Delta, Ceramic, 36mm, +0, 12/14 Femoral Head    [employer] [id number] Right 1 Implanted

## 2021-02-02 NOTE — PLAN OF CARE
Patient vital signs are at baseline: Yes  Patient able to ambulate as they were prior to admission or with assist devices provided by therapies during their stay:  No, sleepy at this time, will dangle and stand at bedside later this evening.  Patient MUST void prior to discharge:  No, due to void.   Patient able to tolerate oral intake:  Yes  Pain has adequate pain control using Oral analgesics:  Yes pain controlled with po dilaudid.

## 2021-02-03 ENCOUNTER — APPOINTMENT (OUTPATIENT)
Dept: OCCUPATIONAL THERAPY | Facility: CLINIC | Age: 81
End: 2021-02-03
Attending: PHYSICIAN ASSISTANT
Payer: MEDICARE

## 2021-02-03 ENCOUNTER — APPOINTMENT (OUTPATIENT)
Dept: PHYSICAL THERAPY | Facility: CLINIC | Age: 81
End: 2021-02-03
Attending: ORTHOPAEDIC SURGERY
Payer: MEDICARE

## 2021-02-03 VITALS
SYSTOLIC BLOOD PRESSURE: 107 MMHG | BODY MASS INDEX: 32.15 KG/M2 | WEIGHT: 193 LBS | TEMPERATURE: 97.4 F | DIASTOLIC BLOOD PRESSURE: 42 MMHG | HEIGHT: 65 IN | OXYGEN SATURATION: 95 % | HEART RATE: 65 BPM | RESPIRATION RATE: 14 BRPM

## 2021-02-03 LAB — HGB BLD-MCNC: 10.3 G/DL (ref 11.7–15.7)

## 2021-02-03 PROCEDURE — 97110 THERAPEUTIC EXERCISES: CPT | Mod: GP | Performed by: PHYSICAL THERAPIST

## 2021-02-03 PROCEDURE — 250N000009 HC RX 250: Performed by: PHYSICIAN ASSISTANT

## 2021-02-03 PROCEDURE — 250N000011 HC RX IP 250 OP 636: Performed by: PHYSICIAN ASSISTANT

## 2021-02-03 PROCEDURE — 97161 PT EVAL LOW COMPLEX 20 MIN: CPT | Mod: GP | Performed by: PHYSICAL THERAPIST

## 2021-02-03 PROCEDURE — 96372 THER/PROPH/DIAG INJ SC/IM: CPT | Performed by: PHYSICIAN ASSISTANT

## 2021-02-03 PROCEDURE — 97116 GAIT TRAINING THERAPY: CPT | Mod: GP | Performed by: PHYSICAL THERAPIST

## 2021-02-03 PROCEDURE — 97535 SELF CARE MNGMENT TRAINING: CPT | Mod: GO

## 2021-02-03 PROCEDURE — 85018 HEMOGLOBIN: CPT | Performed by: PHYSICIAN ASSISTANT

## 2021-02-03 PROCEDURE — 97165 OT EVAL LOW COMPLEX 30 MIN: CPT | Mod: GO

## 2021-02-03 PROCEDURE — 250N000013 HC RX MED GY IP 250 OP 250 PS 637: Mod: GY | Performed by: PHYSICIAN ASSISTANT

## 2021-02-03 PROCEDURE — 36415 COLL VENOUS BLD VENIPUNCTURE: CPT | Performed by: PHYSICIAN ASSISTANT

## 2021-02-03 RX ORDER — ACETAMINOPHEN 325 MG/1
650 TABLET ORAL EVERY 4 HOURS PRN
Start: 2021-02-05 | End: 2023-04-06

## 2021-02-03 RX ORDER — AMOXICILLIN 250 MG
1 CAPSULE ORAL 2 TIMES DAILY PRN
Start: 2021-02-03 | End: 2023-04-06

## 2021-02-03 RX ADMIN — CLINDAMYCIN IN 5 PERCENT DEXTROSE 900 MG: 18 INJECTION, SOLUTION INTRAVENOUS at 00:59

## 2021-02-03 RX ADMIN — POLYETHYLENE GLYCOL 3350 17 G: 17 POWDER, FOR SOLUTION ORAL at 07:33

## 2021-02-03 RX ADMIN — HYDROMORPHONE HYDROCHLORIDE 2 MG: 2 TABLET ORAL at 07:43

## 2021-02-03 RX ADMIN — ENOXAPARIN SODIUM 40 MG: 40 INJECTION SUBCUTANEOUS at 07:32

## 2021-02-03 RX ADMIN — HYDROMORPHONE HYDROCHLORIDE 2 MG: 2 TABLET ORAL at 12:10

## 2021-02-03 RX ADMIN — ACETAMINOPHEN 975 MG: 325 TABLET, FILM COATED ORAL at 00:57

## 2021-02-03 RX ADMIN — Medication 2500 MCG: at 07:31

## 2021-02-03 RX ADMIN — HYDROMORPHONE HYDROCHLORIDE 2 MG: 2 TABLET ORAL at 01:04

## 2021-02-03 RX ADMIN — OMEPRAZOLE 20 MG: 20 CAPSULE, DELAYED RELEASE ORAL at 07:31

## 2021-02-03 RX ADMIN — DIPHENHYDRAMINE HYDROCHLORIDE 12.5 MG: 25 SOLUTION ORAL at 06:35

## 2021-02-03 RX ADMIN — LEVOTHYROXINE SODIUM 50 MCG: 50 TABLET ORAL at 07:31

## 2021-02-03 RX ADMIN — ACETAMINOPHEN 975 MG: 325 TABLET, FILM COATED ORAL at 12:10

## 2021-02-03 RX ADMIN — METOPROLOL SUCCINATE 25 MG: 25 TABLET, FILM COATED, EXTENDED RELEASE ORAL at 07:31

## 2021-02-03 ASSESSMENT — ACTIVITIES OF DAILY LIVING (ADL): PREVIOUS_RESPONSIBILITIES: MEAL PREP;HOUSEKEEPING;LAUNDRY;MEDICATION MANAGEMENT;FINANCES

## 2021-02-03 NOTE — PROGRESS NOTES
SPIRITUAL HEALTH SERVICES Progress Note  FR Med Surg 3    Spiritual Health visit per routine admission Hospital  request for support.  Pt is seated in bedside chair and anticipates discharge today.  Aurora shared that her spouse, Dean, and friend will assist her in recovery at home.  She has now had both hips and both knees replaced and knows what to expect.  She also has three sons living in the [facility] and three daughters in [place]. All supportive despite challenges of Covid.  Aurora shared she is [employer]. Prayer offered and welcomed.      Plan: No plan to follow. Pt discharging.    Gavin Streeter MA  Staff   Pager: [phone]  Phone: [phone]

## 2021-02-03 NOTE — PLAN OF CARE
Physical Therapy Discharge Summary    Reason for therapy discharge:    Discharged to home.    Progress towards therapy goal(s). See goals on Care Plan in [facility] electronic health record for goal details.  Goals partially met.  Barriers to achieving goals:   discharge on same date as initial evaluation.  Pt met transfer and gait goal.  Pt needing Min A with sit > sup.   to assist or pt to get leg .    Therapy recommendation(s):    Continue home exercise program. YUAN exercises to increase LE strength and ROM.  Progress gait as tolerated.

## 2021-02-03 NOTE — PLAN OF CARE
Patient vital signs are at baseline: Yes, on capnography with O2 at 2 lpm nasal cannula.  Patient able to ambulate as they were prior to admission or with assist devices provided by therapies during their stay:  Yes, up to bedside with assist of 1 using gait belt and walker.  Patient MUST void prior to discharge:  Yes, voiding per bedside commode.  Patient able to tolerate oral intake:  Yes, tolerated clear liquids, no nausea.  Pain has adequate pain control using Oral analgesics:  Yes.Pain controlled with tylenol, po dilaudid and ice pack application.  Dressing intact and dry.Care plan reviewed with pt.

## 2021-02-03 NOTE — PLAN OF CARE
Patient vital signs are at baseline: Yes  Patient able to ambulate as they were prior to admission or with assist devices provided by therapies during their stay:  Yes  Patient MUST void prior to discharge:  Yes  Patient able to tolerate oral intake:  Yes  Pain has adequate pain control using Oral analgesics:  Yes    Alert and oriented. Assist of 1 with a walker and gait belt to [facility].

## 2021-02-03 NOTE — PLAN OF CARE
Occupational Therapy Discharge Summary    Reason for therapy discharge:    All goals and outcomes met, no further needs identified.    Progress towards therapy goal(s). See goals on Care Plan in [facility] electronic health record for goal details.  Goals met    Therapy recommendation(s):    Defer to ortho. Anticipate pt will require supervision with toileting, LB dressing, grooming and Abdiaziz with bathing and assist with strenuous IADLs. Pt reports this assist can be provided at discharge

## 2021-02-03 NOTE — PROGRESS NOTES
02/03/21 0949   Quick Adds   Type of Visit Initial Occupational Therapy Evaluation   Living Environment   People in home spouse   Current Living Arrangements house   Home Accessibility stairs to enter home   Number of Stairs, Main Entrance 1   Transportation Anticipated family or friend will provide   Living Environment Comments Spouse works but spouse and friend will assist at discharge   Self-Care   Usual Activity Tolerance good   Current Activity Tolerance moderate   Equipment Currently Used at Home cane, straight;crutches;walker, rolling;shower chair  (reacher. sock aid. long shoe horn. handicap height toilet)   Activity/Exercise/Self-Care Comment At baseline is independent in self-cares without gait aid. Does use cane intermittently   Disability/Function   Fall history within last six months no   Change in Functional Status Since Onset of Current Illness/Injury yes   General Information   Onset of Illness/Injury or Date of Surgery 02/02/21   Referring Physician Timothy Hare, [place]OLLE[facility]   Patient/Family Therapy Goal Statement (OT) Return home today   Additional Occupational Profile Info/Pertinent History of Current Problem R YUAN due to DJD. Hx of HTN. Had YUAN approximately 11 years ago   Existing Precautions/Restrictions fall;no hip IR;no hip ADD past midline;90 degree hip flexion   Cognitive Status Examination   Orientation Status orientation to person, place and time   Affect/Mental Status (Cognitive) WFL   Follows Commands WFL   Visual Perception   Visual Impairment/Limitations corrective lenses full-time   Sensory   Sensory Comments baseline carpal tunnel symptoms in dominant RUE. Otherwise denies numbness/tingling   Pain Assessment   Patient Currently in Pain Yes, see Vital Sign flowsheet   Activities of Daily Living   BADL Assessment/Intervention lower body dressing   Lower Body Dressing Assessment/Training   [place] Level (Lower Body Dressing) minimum assist (75% patient effort)   Instrumental  Activities of Daily Living (IADL)   Previous Responsibilities meal prep;housekeeping;laundry;medication management;finances   IADL Comments plans to have cleaning services at discharge   Clinical Impression   Criteria for Skilled Therapeutic Interventions Met (OT) yes   OT Diagnosis decline function   OT Problem List-Impairments impacting ADL activity tolerance impaired;mobility;pain;post-surgical precautions   Assessment of Occupational Performance 3-5 Performance Deficits   Identified Performance Deficits LB dressing; toileting; bathing; functional mobility   Planned Therapy Interventions (OT) ADL retraining;progressive activity/exercise;home program guidelines   Clinical Decision Making Complexity (OT) low complexity   Therapy Frequency (OT) 1x eval and treat   Anticipated Equipment Needs Upon Discharge (OT)   (nonslip shower mat. hand held shower head)   Risk & Benefits of therapy have been explained evaluation/treatment results reviewed;care plan/treatment goals reviewed;risks/benefits reviewed;current/potential barriers reviewed;participants voiced agreement with care plan;participants included;patient   Comment-Clinical Impression Pt functioning below baseline and will benefit from continue skilled OT to maximize safety and independence   OT Discharge Planning    OT Rationale for DC Rec Defer to ortho. Anticipate pt will require supervision with toileting, LB dressing, grooming and Abdiaziz with bathing and assist with strenuous IADLs. Pt reports this assist can be provided at discharge   Total Evaluation Time (Minutes)   Total Evaluation Time (Minutes) 6

## 2021-02-03 NOTE — DISCHARGE SUMMARY
[facility]  Discharge Summary            Interval History:     81 year old female admitted postoperatively for elective Right total hip arthroplasty  with Dr. Robbie Gamble due to DJD unresponsive to non operative treatment.  There were no notable intraoperative complications.  Patient being discharged post op Day #1.  Aurora Figueroa received skilled nursing, PT, OT, SW inquiry.  There was no Hospitalist care during the stay.     Aurora Figueroa has progressed satisfactorily with ambulation and pain management and without medical complication.  Patient is confident in their discharge and has  met goals with PT and OT. Pt lives at home with family and will be discharged to home based on home living conditions and level of support.  Aurora Figueroa leaves the hospital in stable condition with good chance for recovery.  Today: doing well.  Itching and rash resolving.  Pain controlled with tylenol.  Eating, voiding, ambulating, resting, understands restrictions and is confident in discharge.  No new complaints.     Pain: tolerable.   Nausea: none.  Light headedness : none  Chest pain: none  Shortness of breath: none              Assessment and Plan:     S/P R YUAN Day #1.  Final Diagnosis: same.  VSS, Hemodynamically asymptomatic, pain management adequate.    PLAN:  DVT prophylaxis with 81 mg aspiring dialy, as patient has no history of VTE.  Pain management with Tylenol.  Bowel regiment.  Benadryl for rash/itching.  Hip restrictions.  Patient instructions attached to discharge.      Discharge to home.  Follow up in clinic as previously scheduled, approx 10-14 days post op.    [facility]  [address] [place]  [address]  [phone] fax  [phone] for scheduling                      Physical Exam:     Patient Vitals for the past 12 hrs:   BP Temp Temp src Pulse Resp SpO2   02/03/21 0720 100/48 97.8  F (36.6  C) Temporal 61 14 100 %   02/03/21 0407  108/51 97.6  F (36.4  C) Temporal 69 14 99 %     Hemoglobin   Date Value Ref Range Status   02/03/2021 10.3 (L) 11.7 - 15.7 g/dL Final   01/19/2021 13.7 11.7 - 15.7 g/dL Final       Patient is alert and oriented.  Vitals: stable  Neurovascular status: Grossly intact to RLE  Dressing: clean and dry  Calves: soft and non tender          Timothy Hare PA-C  [facility] - Surgery    2/3/2021

## 2021-02-03 NOTE — DISCHARGE INSTRUCTIONS
POST OP TOTAL HIP INSTRUCTIONS:    Incision care:  To close the incision, staples will be used in most cases. The staples will be removed at the office in 10-14 days from the surgery. If you are going to a TCU (nursing home) from the hospital, the staples can be removed at the nursing home in that time frame. For first 3 days, place a water proof cover over the dressing for showers*. If the dressings get wet, change it with new gauze and paper tape. The incision can be wet after 4 days from the surgery.  You can take a shower but do not soak the incision. The incision should be covered with a light gauze that is held by 2 inch paper tape until follow up.     *If you have an aquacell dressing, (flesh colored rubber like bandage), you may leave this dressing in place until follow up in the clinic, unless; the dressing becomes completely saturated with blood, is leaking, gets water inside as evident by moisture appearing on the inside of the dressing, or you have a skin reaction.  In this case you should remove and use dressings like what is mentioned above.    Soft tissue: It is not unusual to have swelling in the leg (thigh down to the foot) up to 2 months from the surgery. Frequent ankle pumping, elevation of the leg above the heart level and gentle compression support with ace wrap should help with swelling. Icing regularly, for 20 minutes every hour, will also help with swelling and pain.  Due to soft tissue swelling, increased amount of redness around the incision site is also commonly seen. Progressively worsening redness along with increasing pain or increasing drainage from the wound should be a reason to alert us immediately.  You may also experience bruising.  This may occur anywhere from your toes, through the leg and even onto your torso.  It may show up even 1 week after surgery.      Medications:   Pain medication:  You will be discharged from the hospital with prescription pain medication(s); Tylenol at  home per your request.    In most cases, consistent use of this type of pain medication can be weaned off by decreasing the dose and/or increasing time between doses, as soon as possible to avoid potential complications of constipation, nausea, or rash, etc.   As you taper off the pain pills, you may find using them only at nighttime and then controlling the pain with over-the-counter medication(s) such as Ibuprofen OR Naproxen sodium (but not bother) and Tylenol during the day, would be a good way of managing the pain.  NOTE:  if you were prescribed Celebrex/Celecoxhib, Do NOT start Ibuprofen (advil/Motrin) or Naproxen (Naprosyn/Alleve) until done taking it.    In order to minimize the potential problem of blood clot: If you were not on an anticoagulant/blood thinner prior to surgery, you will be asked to do one of the following.  1.Take aspirin (low risk patients).    In order to prevent constipation:  You may also be sent home with stool softener, it is recommended you take this until your bowel movements become normal for you.  You may also need to add a laxative to the routine if you have not had a bowel movement for several days following surgery.  Also, Increase water and fiber intake while on pain medication to help prevent constipation.       Benadryl may be used to prevent itching.    Other medications prior to surgery:  consult the medication list in your discharge instructions for any changes.    Activities:  One of the main problems related to the hip replacement is that there is 1-3% chance for dislocation. This means the ball comes out of the socket.  This is due to the fact that the size of the femoral head is going to be smaller than the native femoral head and also due to the fact that the hip capsule which supports the joint is disrupted and repaired to perform the surgery.  As a result, there is a lack of a protecting barrier until it is re-established with healing of the capsule, which takes a  minimum of 4 months.  For this reason, you are asked to avoid bending your hip greater than 90 degrees of flexion for the first 4 months. This also means avoiding sitting on a very plush chair/couch and not crossing the legs.    As long as there is no fracture complication, you can put all the weight on the operated leg. Walker/crutches can be discontinued according to your own progress. The general guideline is to discontinue the walker/crutches when you feel fairly comfortable and confident with your balance. You can return to walking, swimming, golfing, double tennis but jogging or running are not recommended.     You can lie on either side of your body as soon as you feel comfortable putting pressure on the incision. We recommend putting one or 2 pillows in between the legs when you lie on that side.     Please do not undergo procedures such as dental cleaning, oral surgery, colonoscopy as well as any type of surgery that involves a significant incision for 4 months after surgery.  In case of an emergency, you will need an antibiotic, usually amoxicillin or clindamycin is used for this purpose.  You'll be taking the medication one hour before the procedure.  Please let the provider know that you have artificial implants in your body    Also, for the rest of your life, some providers recommend you take an antibiotic for procedures such as dental cleaning, oral surgery, colonoscopy as well as any type of surgery that involves a significant incision.  Please check with the provider performing the procedure and notify them of your implant.    Follow up in clinic within 14 days after surgery.  May call [phone] to schedule.    Timothy Hare PA-C  [facility] - Surgery  [facility]  [address] [place]  [address]  [phone]

## 2021-02-03 NOTE — PROGRESS NOTES
02/03/21 0726   Quick Adds   Type of Visit Initial PT Evaluation   Living Environment   People in home spouse   Current Living Arrangements house   Home Accessibility stairs to enter home   Number of Stairs, Main Entrance 1   Transportation Anticipated family or friend will provide   Living Environment Comments Pt lives in a house with her  with 1 PADMINI and all needs met on main level.   Self-Care   Usual Activity Tolerance good   Current Activity Tolerance moderate   Equipment Currently Used at Home cane, straight;crutches;walker, rolling  (grabber, shoe horn, sock aide)   Activity/Exercise/Self-Care Comment Pt reports ambulating without a device most of the time but using a cane at times in the evening or community.  Pt was I with all ADLs and I ADLs.  Pt plans to hire a    Disability/Function   Hearing Difficulty or Deaf no   Wear Glasses or Blind yes   Difficulty Eating/Swallowing no   Walking or Climbing Stairs Difficulty no   Doing Errands Independently Difficulty (such as shopping) yes   Fall history within last six months no   General Information   Onset of Illness/Injury or Date of Surgery 02/02/21   Referring Physician Dr. Timothy Hare   Patient/Family Therapy Goals Statement (PT) Pt would like to return home   Pertinent History of Current Problem (include personal factors and/or comorbidities that impact the POC) 81 yr old female admitted for R YUAN due to DJD.     Existing Precautions/Restrictions no hip IR;no hip ADD past midline;90 degree hip flexion   Weight-Bearing Status - RLE weight-bearing as tolerated   General Observations Pt sitting up in chair ready for therapy   Cognition   Orientation Status (Cognition) oriented x 3   Follows Commands (Cognition) follows multi-step commands   Pain Assessment   Patient Currently in Pain Yes, see Vital Sign flowsheet  (R hip 2/10)   Integumentary/Edema   Integumentary/Edema Comments R hip incision   Range of Motion (ROM)   ROM Comment  B LEs WFL except R hip limited to 90 deg   Strength   Strength Comments R hip flex 3-/5, knee ext atleast 3/5.  L LE WFL   Bed Mobility   Bed Mobility sit-supine   Sit-Supine [place] (Bed Mobility) minimum assist (75% patient effort)   Comment (Bed Mobility) Min A at B LEs with VCs for YUAN  prec   Transfers   Transfers sit-stand transfer   Sit-Stand Transfer   Sit-Stand [place] (Transfers) contact guard   Assistive Device (Sit-Stand Transfers) walker, front-wheeled   Sit/Stand Transfer Comments CGA from chair with VCs for safe hand placement and for YUAN prec   Gait/Stairs (Locomotion)   [place] Level (Gait) verbal cues;contact guard   Assistive Device (Gait) walker, front-wheeled   Distance in Feet (Required for LE Total Joints) 80' x 2   Comment (Gait/Stairs) Slow gait with initial discontinuous pattern with pt picking up FWW.  Pt progressed to slow continuous gait with slight R antalgic pattern   Balance   Balance Comments Pt demonstrates good sitting balance and fair + standing balance, requiring UE support on FWW during gait for safety   Coordination   Coordination no deficits were identified   Muscle Tone   Muscle Tone no deficits were identified   Clinical Impression   Criteria for Skilled Therapeutic Intervention yes, treatment indicated   PT Diagnosis (PT) impaired gait   Influenced by the following impairments R LE pain, decreased strength/ROM R LE, impaired gait and balance   Functional limitations due to impairments Unable to amb longer household or community distances, increased falls risk   Clinical Presentation Stable/Uncomplicated   Clinical Presentation Rationale medically stable   Clinical Decision Making (Complexity) low complexity   Therapy Frequency (PT) One time eval and treatment only   Predicted Duration of Therapy Intervention (days/wks) one time eval and treat   Planned Therapy Interventions (PT) bed mobility training;gait training;home exercise program;ROM (range of  motion);strengthening;transfer training   Risk & Benefits of therapy have been explained evaluation/treatment results reviewed;care plan/treatment goals reviewed;risks/benefits reviewed;current/potential barriers reviewed;participants voiced agreement with care plan;participants included;patient   PT Discharge Planning    PT Discharge Recommendation (DC Rec)   (Defer to ortho)   PT Rationale for DC Rec Pt progressing well with all mobility.  I anticipate that pt will be SBA with all mobility at time of DC.   PT Brief overview of current status  Min A sit > sup, SBA gait with FWW   Therapy Certification   Start of care date 02/03/21   Certification date from 02/03/21   Certification date to 02/03/21   Medical Diagnosis R YUAN   Total Evaluation Time   Total Evaluation Time (Minutes) 10   Coping Strategies   Trust Relationship/Rapport care explained;choices provided;emotional support provided;empathic listening provided;questions answered

## 2021-02-04 ENCOUNTER — TELEPHONE (OUTPATIENT)
Dept: ORTHOPEDICS | Facility: CLINIC | Age: 81
End: 2021-02-04

## 2021-02-04 NOTE — TELEPHONE ENCOUNTER
Spoke with patient.  Doing well, no questions at this time.  Offered number for nurse triage and confirmed follow up appointment.    Timothy Hare PA-C  [facility] - Surgery

## 2021-02-15 ENCOUNTER — OFFICE VISIT (OUTPATIENT)
Dept: ORTHOPEDICS | Facility: CLINIC | Age: 81
End: 2021-02-15
Payer: MEDICARE

## 2021-02-15 DIAGNOSIS — Z09 POSTOP CHECK: Primary | ICD-10-CM

## 2021-02-15 PROCEDURE — 99024 POSTOP FOLLOW-UP VISIT: CPT | Performed by: ORTHOPAEDIC SURGERY

## 2021-02-15 NOTE — LETTER
2/15/2021         RE: Aurora Figueroa  [address] [address]        Dear Colleague,    Thank you for referring your patient, Aurora Figueroa, to the [facility] [place]. Please see a copy of my visit note below.    Subjective:  Aurora Figueroa is a 81 year old female who is seen in f/u up for right YUAN, DOS: 2/2/21.  Patient currently using Tylenol ES.  She reports sleeping OK in bed. Swelling of the right foot, she reports elevating at nighttime and throughout the day. She notes increased swelling throughout the day.       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, 2021, doing well overall  Mild foot swelling    Plan:   She was given a long 4 inch Ace wrap which should be used for wrapping from the foot up depending on the amount of swelling  As long as the swelling is improving which is the case at this point this was felt to be a part of the normal recovery  No clinical evidence for DVT since it is improving and she does not really have severe swelling  We talked about frequent ankle range of motion exercises  Follow-up in little bit over a month from now which will be about 6 weeks from the surgery with Tae Aquino PA-C  She will need x-rays when she comes back    Robbie Gamble MD  Dept. Orthopedic Surgery  [facility]       Disclaimer: This note consists of symbols derived from keyboarding, dictation and/or voice recognition software. As a result, there may be errors in the script that have gone undetected. Please consider this when interpreting information found in this chart.        Again, thank you for allowing me to participate in the care of  your patient.        Sincerely,        oRbbie Gamble MD

## 2021-02-15 NOTE — PATIENT INSTRUCTIONS
Frequent ankle and toe movement exercises  Elevation  Wrapping with Ace  Stay on baby aspirin till follow-up in a month or so  Continue exercising dislocation precaution

## 2021-02-15 NOTE — PROGRESS NOTES
Subjective:  Aurora Figueroa is a 81 year old female who is seen in f/u up for right YUAN, DOS: 2/2/21.  Patient currently using Tylenol ES.  She reports sleeping OK in bed. Swelling of the right foot, she reports elevating at nighttime and throughout the day. She notes increased swelling throughout the day.       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, 2021, doing well overall  Mild foot swelling    Plan:   She was given a long 4 inch Ace wrap which should be used for wrapping from the foot up depending on the amount of swelling  As long as the swelling is improving which is the case at this point this was felt to be a part of the normal recovery  No clinical evidence for DVT since it is improving and she does not really have severe swelling  We talked about frequent ankle range of motion exercises  Follow-up in little bit over a month from now which will be about 6 weeks from the surgery with Tae Aquino PA-C  She will need x-rays when she comes back    Robbie Gamble MD  Dept. Orthopedic Surgery  [facility]       Disclaimer: This note consists of symbols derived from keyboarding, dictation and/or voice recognition software. As a result, there may be errors in the script that have gone undetected. Please consider this when interpreting information found in this chart.

## 2021-02-19 ENCOUNTER — AMBULATORY - HEALTHEAST (OUTPATIENT)
Dept: OTHER | Facility: CLINIC | Age: 81
End: 2021-02-19

## 2021-02-19 ENCOUNTER — DOCUMENTATION ONLY (OUTPATIENT)
Dept: OTHER | Facility: CLINIC | Age: 81
End: 2021-02-19

## 2021-03-03 ENCOUNTER — IMMUNIZATION (OUTPATIENT)
Dept: NURSING | Facility: CLINIC | Age: 81
End: 2021-03-03
Payer: MEDICARE

## 2021-03-03 PROCEDURE — 91301 PR COVID VAC MODERNA 100 MCG/0.5 ML IM: CPT

## 2021-03-03 PROCEDURE — 0011A PR COVID VAC MODERNA 100 MCG/0.5 ML IM: CPT

## 2021-03-15 ENCOUNTER — ANCILLARY PROCEDURE (OUTPATIENT)
Dept: GENERAL RADIOLOGY | Facility: CLINIC | Age: 81
End: 2021-03-15
Attending: PHYSICIAN ASSISTANT
Payer: MEDICARE

## 2021-03-15 ENCOUNTER — OFFICE VISIT (OUTPATIENT)
Dept: ORTHOPEDICS | Facility: CLINIC | Age: 81
End: 2021-03-15
Payer: MEDICARE

## 2021-03-15 DIAGNOSIS — Z47.89 ORTHOPEDIC AFTERCARE: Primary | ICD-10-CM

## 2021-03-15 DIAGNOSIS — Z96.641 S/P HIP REPLACEMENT, RIGHT: ICD-10-CM

## 2021-03-15 PROCEDURE — 99024 POSTOP FOLLOW-UP VISIT: CPT | Performed by: PHYSICIAN ASSISTANT

## 2021-03-15 PROCEDURE — 73502 X-RAY EXAM HIP UNI 2-3 VIEWS: CPT | Mod: RT | Performed by: ORTHOPAEDIC SURGERY

## 2021-03-15 NOTE — PATIENT INSTRUCTIONS
It is recommended that you delay any invasive procedures such as dental work, colonoscopy or other surgeries for 4 months after surgery unless it is an emergency.  Please notify the provider if an emergency occurs.    You may discontinue the aspirin regimen or return to a dose recommended by your Primary care provider 6 weeks after surgery.    You may increase your activities as you can tolerate them.  Walking is a good activity.    Hip abduction exercises.  Stand at rail or sink for stability.  While keeping feet parallel and waist level, slowly raise right leg out to the side.  Hold for 2 count then lower slowly.  Repeat working up to 20 repetitions 2x daily.    No bending past 90 degrees at the hip joint, do not cross your legs, or perform excessive twisting at the hip for 4 months from surgery.    Please follow up in 6 weeks.

## 2021-03-15 NOTE — PROGRESS NOTES
HISTORY OF PRESENT ILLNESS:    Aurora Figueroa is a 81 year old female who is seen in follow up for R YUAN, DOS 02/02/2021, Dr. Gamble.  Present symptoms: Pt reports improvement.  Stiffness after sitting otherwise min discomfort.  Can sleep on right side.  Some weakness and fatigue with longer use.  Can only alternate on stairs for couple steps.  Cane on left when out of house.  Following restrictions and taking aspirin.  Denies Chest pain, Calve pain, Fever, Chills.    Current Treatment: post op.    PHYSICAL EXAM:  LMP  (LMP Unknown)   There is no height or weight on file to calculate BMI.   GENERAL APPEARANCE: healthy, alert and no distress   PSYCH:  mentation appears normal and affect normal/bright    MSK:  Right: Hip .  Ambulates: WNG with cane contralateral.  No calve pain on palpation.  Edema min, ankles similar.  CMS: fermin incisional numbness, otherwise grossly intact.  Strength: abduction 4+/5.      IMAGING INTERPRETATION:  XR Pelvis with right hip.    Recent Results (from the past 744 hour(s))   XR Pelvis and Hip Right 1 View    Narrative    History: Follow-up of right total hip replacement, February 2, 2021.  Impression: AP pelvis and lateral right hip x-rays demonstrate   postsurgical changes of right total knee arthroplasty.  The bony   incorporation to the prostheses is satisfactory.  The alignment of the   prostheses is also satisfactory.  No other acute findings are noted.     Dr. Robbie Gamble MD  3/15/2021       ASSESSMENT:  Aurora Figueroa is a 81 year old female S/P R YUAN, DOS 02/02/2021, Dr. Gamble.    Doing well, weakness expected.    PLAN:  - Surgery discussed, images reviewed if applicable, and all questions were answered at this time.  - longer term care instructions given and verbally acknowledged.  - Medications: May discontinue asa.  - Add hip abd exercises.  - Hip restrictions.    Return to clinic 6, weeks.    Timothy Hare PA-C    Dept. Orthopedic Surgery  [facility]    3/15/2021

## 2021-03-15 NOTE — LETTER
3/15/2021         RE: Aurora Figueroa  [address] [address]        Dear Colleague,    Thank you for referring your patient, Aurora Figueroa, to the [facility] [place]. Please see a copy of my visit note below.    HISTORY OF PRESENT ILLNESS:    Aurora Figueroa is a 81 year old female who is seen in follow up for R YUAN, DOS 02/02/2021, Dr. Gamble.  Present symptoms: Pt reports improvement.  Stiffness after sitting otherwise min discomfort.  Can sleep on right side.  Some weakness and fatigue with longer use.  Can only alternate on stairs for couple steps.  Cane on left when out of house.  Following restrictions and taking aspirin.  Denies Chest pain, Calve pain, Fever, Chills.    Current Treatment: post op.    PHYSICAL EXAM:  LMP  (LMP Unknown)   There is no height or weight on file to calculate BMI.   GENERAL APPEARANCE: healthy, alert and no distress   PSYCH:  mentation appears normal and affect normal/bright    MSK:  Right: Hip .  Ambulates: WNG with cane contralateral.  No calve pain on palpation.  Edema min, ankles similar.  CMS: fermin incisional numbness, otherwise grossly intact.  Strength: abduction 4+/5.      IMAGING INTERPRETATION:  XR Pelvis with right hip.    Recent Results (from the past 744 hour(s))   XR Pelvis and Hip Right 1 View    Narrative    History: Follow-up of right total hip replacement, February 2, 2021.  Impression: AP pelvis and lateral right hip x-rays demonstrate   postsurgical changes of right total knee arthroplasty.  The bony   incorporation to the prostheses is satisfactory.  The alignment of the   prostheses is also satisfactory.  No other acute findings are noted.     Dr. Robbie Gamble MD  3/15/2021       ASSESSMENT:  Aurora Figueroa is a 81 year old female S/P R YUAN, DOS 02/02/2021, Dr. Gamble.    Doing well, weakness expected.    PLAN:  - Surgery discussed, images reviewed if applicable, and all questions were  answered at this time.  - longer term care instructions given and verbally acknowledged.  - Medications: May discontinue asa.  - Add hip abd exercises.  - Hip restrictions.    Return to clinic 6, weeks.    Timothy Hare PA-C    Dept. Orthopedic Surgery  [facility]   3/15/2021          Again, thank you for allowing me to participate in the care of your patient.        Sincerely,        Timothy Hare PA-C

## 2021-03-31 ENCOUNTER — IMMUNIZATION (OUTPATIENT)
Dept: NURSING | Facility: CLINIC | Age: 81
End: 2021-03-31
Attending: INTERNAL MEDICINE
Payer: MEDICARE

## 2021-03-31 PROCEDURE — 91301 PR COVID VAC MODERNA 100 MCG/0.5 ML IM: CPT

## 2021-03-31 PROCEDURE — 0012A PR COVID VAC MODERNA 100 MCG/0.5 ML IM: CPT

## 2021-09-05 ENCOUNTER — HEALTH MAINTENANCE LETTER (OUTPATIENT)
Age: 81
End: 2021-09-05

## 2021-12-26 ENCOUNTER — HEALTH MAINTENANCE LETTER (OUTPATIENT)
Age: 81
End: 2021-12-26

## 2022-01-12 DIAGNOSIS — E03.4 HYPOTHYROIDISM DUE TO ACQUIRED ATROPHY OF THYROID: ICD-10-CM

## 2022-01-12 DIAGNOSIS — E78.00 HYPERCHOLESTEROLEMIA: ICD-10-CM

## 2022-01-12 DIAGNOSIS — I10 BENIGN ESSENTIAL HYPERTENSION: ICD-10-CM

## 2022-01-13 NOTE — TELEPHONE ENCOUNTER
Routing refill request to provider for review/approval because:  Labs not current:  TSH, LDL  Patient needs to be seen because it has been more than 1 year since last office visit.    Nadine Crespo RN on 1/13/2022 at 2:49 PM

## 2022-01-14 DIAGNOSIS — I10 BENIGN ESSENTIAL HYPERTENSION: ICD-10-CM

## 2022-01-14 DIAGNOSIS — E78.00 HYPERCHOLESTEROLEMIA: ICD-10-CM

## 2022-01-14 DIAGNOSIS — E03.4 HYPOTHYROIDISM DUE TO ACQUIRED ATROPHY OF THYROID: ICD-10-CM

## 2022-01-14 RX ORDER — SIMVASTATIN 40 MG
TABLET ORAL
Qty: 90 TABLET | Refills: 3 | OUTPATIENT
Start: 2022-01-14

## 2022-01-14 RX ORDER — METOPROLOL SUCCINATE 25 MG/1
25 TABLET, EXTENDED RELEASE ORAL DAILY
Qty: 30 TABLET | Refills: 0 | Status: SHIPPED | OUTPATIENT
Start: 2022-01-14 | End: 2022-02-11

## 2022-01-14 RX ORDER — LEVOTHYROXINE SODIUM 50 UG/1
TABLET ORAL
Qty: 30 TABLET | Refills: 0 | Status: SHIPPED | OUTPATIENT
Start: 2022-01-14 | End: 2022-02-11

## 2022-01-14 RX ORDER — LEVOTHYROXINE SODIUM 50 UG/1
TABLET ORAL
Qty: 90 TABLET | Refills: 3 | OUTPATIENT
Start: 2022-01-14

## 2022-01-14 RX ORDER — SIMVASTATIN 40 MG
40 TABLET ORAL DAILY
Qty: 30 TABLET | Refills: 0 | Status: SHIPPED | OUTPATIENT
Start: 2022-01-14 | End: 2022-02-11

## 2022-01-14 RX ORDER — METOPROLOL SUCCINATE 25 MG/1
TABLET, EXTENDED RELEASE ORAL
Qty: 90 TABLET | Refills: 3 | OUTPATIENT
Start: 2022-01-14

## 2022-01-14 NOTE — TELEPHONE ENCOUNTER
Patient set up appointment for soonest which is 2/11/22    Can you fill one time for her until she can be seen

## 2022-01-14 NOTE — TELEPHONE ENCOUNTER
I saw this patient once for a pre-op 1 year ago. She needs updated labs and to establish with a provider.

## 2022-01-20 ENCOUNTER — OFFICE VISIT (OUTPATIENT)
Dept: INTERNAL MEDICINE | Facility: CLINIC | Age: 82
End: 2022-01-20
Payer: MEDICARE

## 2022-01-20 VITALS
HEART RATE: 68 BPM | SYSTOLIC BLOOD PRESSURE: 134 MMHG | OXYGEN SATURATION: 100 % | DIASTOLIC BLOOD PRESSURE: 80 MMHG | TEMPERATURE: 97.4 F | BODY MASS INDEX: 31.27 KG/M2 | WEIGHT: 187.9 LBS

## 2022-01-20 DIAGNOSIS — R10.31 ABDOMINAL PAIN, RIGHT LOWER QUADRANT: Primary | ICD-10-CM

## 2022-01-20 DIAGNOSIS — R31.9 URINARY TRACT INFECTION WITH HEMATURIA, SITE UNSPECIFIED: ICD-10-CM

## 2022-01-20 DIAGNOSIS — N39.0 URINARY TRACT INFECTION WITH HEMATURIA, SITE UNSPECIFIED: ICD-10-CM

## 2022-01-20 LAB
ALBUMIN SERPL-MCNC: 3.2 G/DL (ref 3.4–5)
ALBUMIN UR-MCNC: NEGATIVE MG/DL
ALP SERPL-CCNC: 96 U/L (ref 40–150)
ALT SERPL W P-5'-P-CCNC: 25 U/L (ref 0–50)
ANION GAP SERPL CALCULATED.3IONS-SCNC: 5 MMOL/L (ref 3–14)
APPEARANCE UR: CLEAR
AST SERPL W P-5'-P-CCNC: 16 U/L (ref 0–45)
BACTERIA #/AREA URNS HPF: ABNORMAL /HPF
BASOPHILS # BLD AUTO: 0 10E3/UL (ref 0–0.2)
BASOPHILS NFR BLD AUTO: 0 %
BILIRUB SERPL-MCNC: 0.5 MG/DL (ref 0.2–1.3)
BILIRUB UR QL STRIP: NEGATIVE
BUN SERPL-MCNC: 16 MG/DL (ref 7–30)
CALCIUM SERPL-MCNC: 9 MG/DL (ref 8.5–10.1)
CHLORIDE BLD-SCNC: 107 MMOL/L (ref 94–109)
CO2 SERPL-SCNC: 28 MMOL/L (ref 20–32)
COLOR UR AUTO: YELLOW
CREAT SERPL-MCNC: 0.69 MG/DL (ref 0.52–1.04)
EOSINOPHIL # BLD AUTO: 0.2 10E3/UL (ref 0–0.7)
EOSINOPHIL NFR BLD AUTO: 3 %
ERYTHROCYTE [DISTWIDTH] IN BLOOD BY AUTOMATED COUNT: 14.1 % (ref 10–15)
GFR SERPL CREATININE-BSD FRML MDRD: 87 ML/MIN/1.73M2
GLUCOSE BLD-MCNC: 115 MG/DL (ref 70–99)
GLUCOSE UR STRIP-MCNC: NEGATIVE MG/DL
HCT VFR BLD AUTO: 40.4 % (ref 35–47)
HGB BLD-MCNC: 12.6 G/DL (ref 11.7–15.7)
HGB UR QL STRIP: NEGATIVE
KETONES UR STRIP-MCNC: ABNORMAL MG/DL
LEUKOCYTE ESTERASE UR QL STRIP: NEGATIVE
LYMPHOCYTES # BLD AUTO: 1.2 10E3/UL (ref 0.8–5.3)
LYMPHOCYTES NFR BLD AUTO: 17 %
MCH RBC QN AUTO: 29.6 PG (ref 26.5–33)
MCHC RBC AUTO-ENTMCNC: 31.2 G/DL (ref 31.5–36.5)
MCV RBC AUTO: 95 FL (ref 78–100)
MONOCYTES # BLD AUTO: 0.6 10E3/UL (ref 0–1.3)
MONOCYTES NFR BLD AUTO: 8 %
NEUTROPHILS # BLD AUTO: 5.2 10E3/UL (ref 1.6–8.3)
NEUTROPHILS NFR BLD AUTO: 72 %
NITRATE UR QL: POSITIVE
PH UR STRIP: 5.5 [PH] (ref 5–7)
PLATELET # BLD AUTO: 200 10E3/UL (ref 150–450)
POTASSIUM BLD-SCNC: 3.7 MMOL/L (ref 3.4–5.3)
PROT SERPL-MCNC: 7.5 G/DL (ref 6.8–8.8)
RBC # BLD AUTO: 4.26 10E6/UL (ref 3.8–5.2)
RBC #/AREA URNS AUTO: ABNORMAL /HPF
SODIUM SERPL-SCNC: 140 MMOL/L (ref 133–144)
SP GR UR STRIP: 1.02 (ref 1–1.03)
SQUAMOUS #/AREA URNS AUTO: ABNORMAL /LPF
UROBILINOGEN UR STRIP-ACNC: 0.2 E.U./DL
WBC # BLD AUTO: 7.2 10E3/UL (ref 4–11)
WBC #/AREA URNS AUTO: ABNORMAL /HPF

## 2022-01-20 PROCEDURE — 36415 COLL VENOUS BLD VENIPUNCTURE: CPT | Performed by: PHYSICIAN ASSISTANT

## 2022-01-20 PROCEDURE — 80053 COMPREHEN METABOLIC PANEL: CPT | Performed by: PHYSICIAN ASSISTANT

## 2022-01-20 PROCEDURE — 87086 URINE CULTURE/COLONY COUNT: CPT | Performed by: PHYSICIAN ASSISTANT

## 2022-01-20 PROCEDURE — 87186 SC STD MICRODIL/AGAR DIL: CPT | Performed by: PHYSICIAN ASSISTANT

## 2022-01-20 PROCEDURE — 99213 OFFICE O/P EST LOW 20 MIN: CPT | Performed by: PHYSICIAN ASSISTANT

## 2022-01-20 PROCEDURE — 81001 URINALYSIS AUTO W/SCOPE: CPT | Performed by: PHYSICIAN ASSISTANT

## 2022-01-20 PROCEDURE — 85025 COMPLETE CBC W/AUTO DIFF WBC: CPT | Performed by: PHYSICIAN ASSISTANT

## 2022-01-20 RX ORDER — NITROFURANTOIN 25; 75 MG/1; MG/1
100 CAPSULE ORAL 2 TIMES DAILY
Qty: 10 CAPSULE | Refills: 0 | Status: SHIPPED | OUTPATIENT
Start: 2022-01-20 | End: 2022-01-25

## 2022-01-20 NOTE — PROGRESS NOTES
Assessment & Plan     Abdominal pain, right lower quadrant  - further work up as below   - no indications for imaging at this time, but should symptoms worsen or fail to improve we will need to reconsider this   - follow up in 1 week if continued pain   - CBC with platelets and differential  - Comprehensive metabolic panel (BMP + Alb, Alk Phos, ALT, AST, Total. Bili, TP)  - UA macro with reflex to Microscopic and Culture - Clinc Collect  - Urine Microscopic Exam  - Urine Culture    Urinary tract infection with hematuria, site unspecified  - do not suspect complicated UTI as she reports no flank pain  - reviewed follow up if symptoms fail to improve or worsen  - reviewed signs/symptoms that should prompt seeking ED  - nitroFURantoin macrocrystal-monohydrate (MACROBID) 100 MG capsule; Take 1 capsule (100 mg) by mouth 2 times daily for 5 days    Follow up with PCP. UA to ensure RBCs resolved.     No follow-ups on file.    XOCHITL Rodriguez [facility]    Subjective   Aurora is a 82 year old who presents for the following health issues    Subjective   Chidi is a 72 year old who presents for the following health issues      Pain History:  When did you first notice your pain? - Less than 1 week   Have you seen anyone else for your pain? No  Where in your body do you have pain? Abdominal/Flank Pain  Onset/Duration: 1 week  Description:   Character: Dull ache and Sharp  Location: right flank  Radiation: Pelvic region  Intensity: moderate  Progression of Symptoms:  same  Accompanying Signs & Symptoms:  Fever/Chills: YES  Gas/Bloating: YES  Nausea: no  Vomitting: no  Diarrhea: no  Constipation: YES  Dysuria or Hematuria: no  History:   Trauma: no  Previous similar pain: no  Previous tests done: none  Precipitating factors:   Does the pain change with:     Food: YES    Bowel Movement: no    Urination: no   Other factors:  no  Therapies tried and outcome: nothing    Patient  reports last Friday she woke up with chills and fever.   - took home covid test Friday and Saturday and these were negative  - was having discomfort in abdomen   - right lower quadrant pain  - gets worse after eating   - loss of appetite   - complains of belching   - hasn't felt feverish since Sunday   - normal BM today   - pink discharge vaginally a few days ago but has seen specialist for this in the past with negative work up   - no urinary symptoms   - no nausea or vomiting   - no flank pain         Results for orders placed or performed in visit on 01/20/22   Comprehensive metabolic panel (BMP + Alb, Alk Phos, ALT, AST, Total. Bili, TP)     Status: Abnormal   Result Value Ref Range    Sodium 140 133 - 144 mmol/L    Potassium 3.7 3.4 - 5.3 mmol/L    Chloride 107 94 - 109 mmol/L    Carbon Dioxide (CO2) 28 20 - 32 mmol/L    Anion Gap 5 3 - 14 mmol/L    Urea Nitrogen 16 7 - 30 mg/dL    Creatinine 0.69 0.52 - 1.04 mg/dL    Calcium 9.0 8.5 - 10.1 mg/dL    Glucose 115 (H) 70 - 99 mg/dL    Alkaline Phosphatase 96 40 - 150 U/L    AST 16 0 - 45 U/L    ALT 25 0 - 50 U/L    Protein Total 7.5 6.8 - 8.8 g/dL    Albumin 3.2 (L) 3.4 - 5.0 g/dL    Bilirubin Total 0.5 0.2 - 1.3 mg/dL    GFR Estimate 87 >60 mL/min/1.73m2   UA macro with reflex to Microscopic and Culture - Clinc Collect     Status: Abnormal    Specimen: Urine, Clean Catch   Result Value Ref Range    Color Urine Yellow Colorless, Straw, Light Yellow, Yellow    Appearance Urine Clear Clear    Glucose Urine Negative Negative mg/dL    Bilirubin Urine Negative Negative    Ketones Urine Trace (A) Negative mg/dL    Specific Gravity Urine 1.025 1.003 - 1.035    Blood Urine Negative Negative    pH Urine 5.5 5.0 - 7.0    Protein Albumin Urine Negative Negative mg/dL    Urobilinogen Urine 0.2 0.2, 1.0 E.U./dL    Nitrite Urine Positive (A) Negative    Leukocyte Esterase Urine Negative Negative   CBC with platelets and differential     Status: Abnormal   Result Value Ref Range     WBC Count 7.2 4.0 - 11.0 10e3/uL    RBC Count 4.26 3.80 - 5.20 10e6/uL    Hemoglobin 12.6 11.7 - 15.7 g/dL    Hematocrit 40.4 35.0 - 47.0 %    MCV 95 78 - 100 fL    MCH 29.6 26.5 - 33.0 pg    MCHC 31.2 (L) 31.5 - 36.5 g/dL    RDW 14.1 10.0 - 15.0 %    Platelet Count 200 150 - 450 10e3/uL    % Neutrophils 72 %    % Lymphocytes 17 %    % Monocytes 8 %    % Eosinophils 3 %    % Basophils 0 %    Absolute Neutrophils 5.2 1.6 - 8.3 10e3/uL    Absolute Lymphocytes 1.2 0.8 - 5.3 10e3/uL    Absolute Monocytes 0.6 0.0 - 1.3 10e3/uL    Absolute Eosinophils 0.2 0.0 - 0.7 10e3/uL    Absolute Basophils 0.0 0.0 - 0.2 10e3/uL   Urine Microscopic Exam     Status: Abnormal   Result Value Ref Range    Bacteria Urine Moderate (A) None Seen /HPF    RBC Urine 2-5 (A) 0-2 /HPF /HPF    WBC Urine 10-25 (A) 0-5 /HPF /HPF    Squamous Epithelials Urine Few (A) None Seen /LPF   CBC with platelets and differential     Status: Abnormal    Narrative    The following orders were created for panel order CBC with platelets and differential.  Procedure                               Abnormality         Status                     ---------                               -----------         ------                     CBC with platelets and d...[[id number]]  Abnormal            Final result                 Please view results for these tests on the individual orders.           Objective    /80   Pulse 68   Temp 97.4  F (36.3  C) (Tympanic)   Wt 85.2 kg (187 lb 14.4 oz)   LMP  (LMP Unknown)   SpO2 100%   BMI 31.27 kg/m    Body mass index is 31.27 kg/m .  Physical Exam   GENERAL: healthy, alert and no distress  RESP: lungs clear to auscultation - no rales, rhonchi or wheezes  CV: regular rates and rhythm, normal S1 S2, no S3 or S4 and no murmur, click or rub  ABDOMEN: soft, tender right mid abdomen, without hepatosplenomegaly or masses and bowel sounds normal, present in all 4 quadrants

## 2022-01-21 LAB — BACTERIA UR CULT: ABNORMAL

## 2022-02-01 ENCOUNTER — ANCILLARY PROCEDURE (OUTPATIENT)
Dept: MAMMOGRAPHY | Facility: CLINIC | Age: 82
End: 2022-02-01
Payer: MEDICARE

## 2022-02-01 DIAGNOSIS — Z12.31 VISIT FOR SCREENING MAMMOGRAM: ICD-10-CM

## 2022-02-01 PROCEDURE — 77063 BREAST TOMOSYNTHESIS BI: CPT | Mod: TC | Performed by: RADIOLOGY

## 2022-02-01 PROCEDURE — 77067 SCR MAMMO BI INCL CAD: CPT | Mod: TC | Performed by: RADIOLOGY

## 2022-02-09 DIAGNOSIS — I10 BENIGN ESSENTIAL HYPERTENSION: ICD-10-CM

## 2022-02-09 DIAGNOSIS — E78.00 HYPERCHOLESTEROLEMIA: ICD-10-CM

## 2022-02-09 DIAGNOSIS — E03.4 HYPOTHYROIDISM DUE TO ACQUIRED ATROPHY OF THYROID: ICD-10-CM

## 2022-02-09 RX ORDER — SIMVASTATIN 40 MG
40 TABLET ORAL DAILY
Qty: 30 TABLET | Refills: 0 | Status: CANCELLED | OUTPATIENT
Start: 2022-02-09

## 2022-02-11 ENCOUNTER — TELEPHONE (OUTPATIENT)
Dept: INTERNAL MEDICINE | Facility: CLINIC | Age: 82
End: 2022-02-11

## 2022-02-11 ENCOUNTER — OFFICE VISIT (OUTPATIENT)
Dept: INTERNAL MEDICINE | Facility: CLINIC | Age: 82
End: 2022-02-11
Payer: MEDICARE

## 2022-02-11 VITALS
HEIGHT: 65 IN | SYSTOLIC BLOOD PRESSURE: 130 MMHG | OXYGEN SATURATION: 97 % | DIASTOLIC BLOOD PRESSURE: 68 MMHG | BODY MASS INDEX: 31.32 KG/M2 | WEIGHT: 188 LBS | TEMPERATURE: 96.8 F | HEART RATE: 55 BPM

## 2022-02-11 DIAGNOSIS — R30.0 DYSURIA: ICD-10-CM

## 2022-02-11 DIAGNOSIS — E78.00 HYPERCHOLESTEROLEMIA: ICD-10-CM

## 2022-02-11 DIAGNOSIS — E66.01 MORBID OBESITY (H): ICD-10-CM

## 2022-02-11 DIAGNOSIS — E66.811 CLASS 1 OBESITY DUE TO EXCESS CALORIES WITH SERIOUS COMORBIDITY AND BODY MASS INDEX (BMI) OF 32.0 TO 32.9 IN ADULT: ICD-10-CM

## 2022-02-11 DIAGNOSIS — E66.09 CLASS 1 OBESITY DUE TO EXCESS CALORIES WITH SERIOUS COMORBIDITY AND BODY MASS INDEX (BMI) OF 32.0 TO 32.9 IN ADULT: ICD-10-CM

## 2022-02-11 DIAGNOSIS — R31.29 MICROSCOPIC HEMATURIA: ICD-10-CM

## 2022-02-11 DIAGNOSIS — Z00.00 ENCOUNTER FOR MEDICARE ANNUAL WELLNESS EXAM: Primary | ICD-10-CM

## 2022-02-11 DIAGNOSIS — E03.4 HYPOTHYROIDISM DUE TO ACQUIRED ATROPHY OF THYROID: ICD-10-CM

## 2022-02-11 DIAGNOSIS — Z98.84 BARIATRIC SURGERY STATUS: ICD-10-CM

## 2022-02-11 DIAGNOSIS — E55.9 VITAMIN D DEFICIENCY: ICD-10-CM

## 2022-02-11 DIAGNOSIS — I10 BENIGN ESSENTIAL HYPERTENSION: ICD-10-CM

## 2022-02-11 DIAGNOSIS — N81.9 FEMALE GENITAL PROLAPSE, UNSPECIFIED TYPE: ICD-10-CM

## 2022-02-11 DIAGNOSIS — R79.9 ABNORMAL FINDING OF BLOOD CHEMISTRY, UNSPECIFIED: ICD-10-CM

## 2022-02-11 LAB
ALBUMIN UR-MCNC: NEGATIVE MG/DL
APPEARANCE UR: CLEAR
BACTERIA #/AREA URNS HPF: ABNORMAL /HPF
BILIRUB UR QL STRIP: NEGATIVE
CHOLEST SERPL-MCNC: 169 MG/DL
COLOR UR AUTO: YELLOW
DEPRECATED CALCIDIOL+CALCIFEROL SERPL-MC: 42 UG/L (ref 20–75)
FASTING STATUS PATIENT QL REPORTED: YES
FERRITIN SERPL-MCNC: 1120 NG/ML (ref 8–252)
FOLATE SERPL-MCNC: 42 NG/ML
GLUCOSE UR STRIP-MCNC: NEGATIVE MG/DL
HBA1C MFR BLD: 5.8 % (ref 0–5.6)
HDLC SERPL-MCNC: 62 MG/DL
HGB UR QL STRIP: NEGATIVE
KETONES UR STRIP-MCNC: NEGATIVE MG/DL
LDLC SERPL CALC-MCNC: 91 MG/DL
LEUKOCYTE ESTERASE UR QL STRIP: ABNORMAL
NITRATE UR QL: POSITIVE
NONHDLC SERPL-MCNC: 107 MG/DL
PH UR STRIP: 5 [PH] (ref 5–7)
RBC #/AREA URNS AUTO: ABNORMAL /HPF
SP GR UR STRIP: 1.02 (ref 1–1.03)
SQUAMOUS #/AREA URNS AUTO: ABNORMAL /LPF
TRIGL SERPL-MCNC: 78 MG/DL
TSH SERPL DL<=0.005 MIU/L-ACNC: 2.86 MU/L (ref 0.4–4)
UROBILINOGEN UR STRIP-ACNC: 0.2 E.U./DL
VIT B12 SERPL-MCNC: >6000 PG/ML (ref 193–986)
WBC #/AREA URNS AUTO: ABNORMAL /HPF

## 2022-02-11 PROCEDURE — 99214 OFFICE O/P EST MOD 30 MIN: CPT | Mod: 25 | Performed by: NURSE PRACTITIONER

## 2022-02-11 PROCEDURE — 87086 URINE CULTURE/COLONY COUNT: CPT | Performed by: NURSE PRACTITIONER

## 2022-02-11 PROCEDURE — G0439 PPPS, SUBSEQ VISIT: HCPCS | Performed by: NURSE PRACTITIONER

## 2022-02-11 PROCEDURE — 36415 COLL VENOUS BLD VENIPUNCTURE: CPT | Performed by: NURSE PRACTITIONER

## 2022-02-11 PROCEDURE — 82728 ASSAY OF FERRITIN: CPT | Performed by: NURSE PRACTITIONER

## 2022-02-11 PROCEDURE — 82607 VITAMIN B-12: CPT | Performed by: NURSE PRACTITIONER

## 2022-02-11 PROCEDURE — 87186 SC STD MICRODIL/AGAR DIL: CPT | Performed by: NURSE PRACTITIONER

## 2022-02-11 PROCEDURE — 84443 ASSAY THYROID STIM HORMONE: CPT | Performed by: NURSE PRACTITIONER

## 2022-02-11 PROCEDURE — 80061 LIPID PANEL: CPT | Performed by: NURSE PRACTITIONER

## 2022-02-11 PROCEDURE — 82746 ASSAY OF FOLIC ACID SERUM: CPT | Performed by: NURSE PRACTITIONER

## 2022-02-11 PROCEDURE — 81001 URINALYSIS AUTO W/SCOPE: CPT | Performed by: NURSE PRACTITIONER

## 2022-02-11 PROCEDURE — 82306 VITAMIN D 25 HYDROXY: CPT | Performed by: NURSE PRACTITIONER

## 2022-02-11 PROCEDURE — 83036 HEMOGLOBIN GLYCOSYLATED A1C: CPT | Performed by: NURSE PRACTITIONER

## 2022-02-11 RX ORDER — LEVOTHYROXINE SODIUM 50 UG/1
TABLET ORAL
Qty: 30 TABLET | Refills: 0 | OUTPATIENT
Start: 2022-02-11

## 2022-02-11 RX ORDER — LEVOTHYROXINE SODIUM 50 UG/1
TABLET ORAL
Qty: 30 TABLET | Refills: 0 | Status: SHIPPED | OUTPATIENT
Start: 2022-02-11 | End: 2022-02-11

## 2022-02-11 RX ORDER — LEVOTHYROXINE SODIUM 50 UG/1
TABLET ORAL
Qty: 90 TABLET | Refills: 3 | Status: SHIPPED | OUTPATIENT
Start: 2022-02-11 | End: 2023-02-21

## 2022-02-11 RX ORDER — METOPROLOL SUCCINATE 25 MG/1
25 TABLET, EXTENDED RELEASE ORAL DAILY
Qty: 90 TABLET | Refills: 3 | Status: SHIPPED | OUTPATIENT
Start: 2022-02-11 | End: 2023-03-08

## 2022-02-11 RX ORDER — SIMVASTATIN 40 MG
40 TABLET ORAL DAILY
Qty: 30 TABLET | Refills: 0 | Status: SHIPPED | OUTPATIENT
Start: 2022-02-11 | End: 2022-02-11

## 2022-02-11 RX ORDER — METOPROLOL SUCCINATE 25 MG/1
25 TABLET, EXTENDED RELEASE ORAL DAILY
Qty: 30 TABLET | Refills: 0 | OUTPATIENT
Start: 2022-02-11

## 2022-02-11 RX ORDER — METOPROLOL SUCCINATE 25 MG/1
25 TABLET, EXTENDED RELEASE ORAL DAILY
Qty: 30 TABLET | Refills: 0 | Status: SHIPPED | OUTPATIENT
Start: 2022-02-11 | End: 2022-02-11

## 2022-02-11 RX ORDER — SIMVASTATIN 40 MG
40 TABLET ORAL DAILY
Qty: 90 TABLET | Refills: 3 | Status: SHIPPED | OUTPATIENT
Start: 2022-02-11 | End: 2023-03-08

## 2022-02-11 ASSESSMENT — ENCOUNTER SYMPTOMS
JOINT SWELLING: 0
HEARTBURN: 1
ARTHRALGIAS: 0
CHILLS: 0
MYALGIAS: 0
DYSURIA: 0
FREQUENCY: 0
BREAST MASS: 0
CONSTIPATION: 0
EYE PAIN: 0
PALPITATIONS: 0
COUGH: 0
PARESTHESIAS: 0
DIZZINESS: 0
WEAKNESS: 0
NERVOUS/ANXIOUS: 0
ABDOMINAL PAIN: 1
FEVER: 0
NAUSEA: 0
SORE THROAT: 0
HEADACHES: 0
HEMATOCHEZIA: 0
HEMATURIA: 1
DIARRHEA: 0
SHORTNESS OF BREATH: 0

## 2022-02-11 ASSESSMENT — ACTIVITIES OF DAILY LIVING (ADL): CURRENT_FUNCTION: NO ASSISTANCE NEEDED

## 2022-02-11 ASSESSMENT — MIFFLIN-ST. JEOR: SCORE: 1323.64

## 2022-02-11 NOTE — TELEPHONE ENCOUNTER
This refill is a duplicate of something already sent to the pharmacy or another refill already in process.   Aleida Thornton RN  [facility]

## 2022-02-11 NOTE — TELEPHONE ENCOUNTER
Call to patient re: lab results/urine  -Await UC results  -Reviewed lab results, B12 is high, I advised the patient to cut back on her B12 supplement. She currently takes B12, 5000 units daily. I advised her to cut back to every other day.  -Patient verbalized understanding  BETTY Calderon CNP

## 2022-02-11 NOTE — PROGRESS NOTES
The patient was provided with suggestions to help her develop a healthy physical lifestyle.  She is at risk for lack of exercise and has been provided with information to increase physical activity for the benefit of her well-being.  The patient was counseled and encouraged to consider modifying their diet and eating habits. She was provided with information on recommended healthy diet options.  The patient was provided with written information regarding signs of hearing loss.

## 2022-02-11 NOTE — PATIENT INSTRUCTIONS
-Shoulder exercises as below  -Labs today  -Referral to GYN  -Medications refilled        Patient Education     Exercises for Shoulder Flexibility: External Rotation    This stretch can help restore shoulder flexibility and relieve pain over time. When stretching, be sure to breathe deeply. Follow any special instructions from your healthcare provider or physical therapist:  1.  a doorway. Grasp the doorjamb with the hand on the frozen side. Your arm should be bent.  2. With the other hand, hold the elbow on the side with the involved frozen (stiff) shoulder firmly against your body.  3. Standing in the same spot, rotate your body away from the doorjamb. Stop when you feel the stretch in the shoulder. At first, try to hold the stretch for 5 seconds.  4. Work up to doing 3 sets of this stretch, 3 times a day. Work up to holding the stretch for 30 to 60 seconds.  Note: Keep your arms as still as you can. Over time, rotate your body a little more to enhance the stretch. But be careful not to twist your back.  Frozen shoulder is another name for adhesive capsulitis, which causes restricted movement in the shoulder. If you have frozen shoulder, this stretch may cause discomfort, especially when you first get started. A few months may pass before you achieve the results you want. But once your shoulder heals, it rarely becomes frozen again. So stick to your stretching program. If you have any questions, be sure to ask your healthcare provider.  Aleisha last reviewed this educational content on 3/1/2018    2996-1408 [employer] All rights reserved. This information is not intended as a substitute for professional medical care. Always follow your healthcare professional's instructions.           Patient Education     Exercises for Shoulder Flexibility: Internal Rotation    This stretch can help restore shoulder flexibility and relieve pain over time. When stretching, be sure to breathe  deeply. Follow any special instructions from your healthcare provider or physical therapist.  1. While seated, move the arm on the side you want to stretch toward the middle of your back. The palm of your hand should face out.  2. Cup your other hand under the hand that s behind your back. Gently push your cupped hand upward until you feel the stretch in the shoulder. Try to hold the stretch for 5 seconds.  3. Work up to doing 3 sets of this stretch, 3 times a day. Work up to holding the stretch for 30 to 60 seconds.  Note: Keep your back straight. It s OK if your hand can t reach the middle of your back. Instead, start the stretch with your hand as close as you can get it to the middle of your back.  Frozen shoulder  Frozen shoulder is another name for adhesive capsulitis. This causes restricted movement in the shoulder. If you have frozen shoulder, this stretch may cause discomfort, especially when you first get started. A few months may pass before you achieve the results you want. But once your shoulder heals, it rarely becomes frozen again. So stick to your stretching program. If you have any questions, be sure to ask your healthcare provider.   Aleisha last reviewed this educational content on 12/1/2017 2000-2021 [employer] All rights reserved. This information is not intended as a substitute for professional medical care. Always follow your healthcare professional's instructions.           Patient Education     Exercises for Shoulder Flexibility: Adduction (Reaching Across)    This stretch can help restore shoulder flexibility and relieve pain over time. When stretching, be sure to breathe deeply. And follow any special instructions from your doctor or physical therapist:  5. Put the hand from the side you want to stretch on your opposite shoulder. Your elbow should point away from your body. Try to raise your elbow as close to shoulder height as you can.  6. With your other hand, push the  raised elbow toward the opposite shoulder. Avoid turning your head. Stop when you feel the stretch. Try to hold the stretch for 5 seconds.  7. Work up to doing 3 sets of this stretch, 3 times a day. Work up to holding the stretch for 30 to 60 seconds.  Note: Be sure to push your elbow across your chest, not up toward your chin. Over time, try to push your elbow farther across your chest to enhance the stretch.  Frozen shoulder is another name for adhesive capsulitis, which causes restricted movement in the shoulder. If you have frozen shoulder, this stretch may cause discomfort, especially when you first get started. A few months may pass before you achieve the results you want. Once your shoulder heals, it rarely becomes frozen again. So stick to your stretching program. If you have any questions, be sure to ask your doctor.  [employer] last reviewed this educational content on 3/1/2018    7615-3747 [employer]. All rights reserved. This information is not intended as a substitute for professional medical care. Always follow your healthcare professional's instructions.           Patient Education     Exercises for Shoulder Flexibility: Back Scratch    Improving your flexibility can reduce pain. Stretching exercises also can help increase your range of pain-free motion. Breathe normally when you exercise. Try to use smooth, fluid movements. Never force a stretch.  Note: Follow any special instructions you are given. If you feel pain, stop the exercise. If the pain continues after stopping, call your healthcare provider.    Stand straight, placing the back of your hand on the side you want to stretch flat against your lower back.    Throw one end of a towel over your shoulder. Grab it behind your back with your other hand.    Pull down gently on the towel with your front arm. Let your back arm slide up as high as is comfortable. You ll feel a stretch in your shoulder. Hold the stretch for a few  seconds.    Repeat 3 to 5 times. Build up to holding each stretch for 30 to 60 seconds.  For your safety, check with your healthcare provider before starting an exercise program.  [employer] last reviewed this educational content on 3/1/2018    7928-3091 [employer]. All rights reserved. This information is not intended as a substitute for professional medical care. Always follow your healthcare professional's instructions.           Patient Education     Exercises for Shoulder Flexibility: Wall Walk    Improving your flexibility can reduce pain. Stretching exercises also can help increase your range of pain-free motion. Breathe normally when you exercise. Use smooth, fluid movements.  Note: Follow any special instructions you are given. If you feel pain, stop the exercise. If the pain continues after stopping, call your healthcare provider:    Stand with your shoulder about 2 feet from the wall.    Raise your arm to shoulder level and gently  walk  your fingers up the wall as high as you can.    Hold for a few seconds. Then walk your fingers back down.    Repeat 3 times. Move closer to the wall as you repeat.    Build up to holding each stretch for 30 seconds.  Caution: Do this stretch only if your healthcare provider recommends it. Don t do it when you are first injured.  [employer] last reviewed this educational content on 3/1/2018    5944-7250 [employer]. All rights reserved. This information is not intended as a substitute for professional medical care. Always follow your healthcare professional's instructions.           Patient Education     Pendulum (Flexibility)    10. Lean over next to a table, with your left arm supporting your weight on the table.  11. Relax your right arm and let it hang straight down.  12. Slowly begin to swing your right arm in a small [place]. Gradually make the [place] bigger if you can. Change direction after 1 minute of motion.  13. Next, swing your right arm  backward and forward. Then move it side to side. Change direction after 1 minute of motion.  14. Repeat these movements for about 5 minutes.  15. Switch sides and repeat if instructed.  16. Do this exercise 3 times a day, or as often as instructed.  Mama last reviewed this educational content on 2/1/2020 2000-2021 [employer]. All rights reserved. This information is not intended as a substitute for professional medical care. Always follow your healthcare professional's instructions.           Patient Education   Personalized Prevention Plan  You are due for the preventive services outlined below.  Your care team is available to assist you in scheduling these services.  If you have already completed any of these items, please share that information with your care team to update in your medical record.  Health Maintenance Due   Topic Date Due     ANNUAL REVIEW OF HM ORDERS  Never done     Cholesterol Lab  11/09/2021     Thyroid Function Lab  11/09/2021     FALL RISK ASSESSMENT  11/09/2021     Your Health Risk Assessment indicates you feel you are not in good health    A healthy lifestyle helps keep the body fit and the mind alert. It helps protect you from disease, helps you fight disease, and helps prevent chronic disease (disease that doesn't go away) from getting worse. This is important as you get older and begin to notice twinges in muscles and joints and a decline in the strength and stamina you once took for granted. A healthy lifestyle includes good healthcare, good nutrition, weight control, recreation, and regular exercise. Avoid harmful substances and do what you can to keep safe. Another part of a healthy lifestyle is stay mentally active and socially involved.    Good healthcare     Have a wellness visit every year.     If you have new symptoms, let us know right away. Don't wait until the next checkup.     Take medicines exactly as prescribed and keep your medicines in a safe place.  Tell us if your medicine causes problems.   Healthy diet and weight control     Eat 3 or 4 small, nutritious, low-fat, high-fiber meals a day. Include a variety of fruits, vegetables, and whole-grain foods.     Make sure you get enough calcium in your diet. Calcium, vitamin D, and exercise help prevent osteoporosis (bone thinning).     If you live alone, try eating with others when you can. That way you get a good meal and have company while you eat it.     Try to keep a healthy weight. If you eat more calories than your body uses for energy, it will be stored as fat and you will gain weight.     Recreation   Recreation is not limited to sports and team events. It includes any activity that provides relaxation, interest, enjoyment, and exercise. Recreation provides an outlet for physical, mental, and social energy. It can give a sense of worth and achievement. It can help you stay healthy.    Mental Exercise and Social Involvement  Mental and emotional health is as important as physical health. Keep in touch with friends and family. Stay as active as possible. Continue to learn and challenge yourself.   Things you can do to stay mentally active are:    Learn something new, like a foreign language or musical instrument.     Play SCRABBLE or do crossword puzzles. If you cannot find people to play these games with you at home, you can play them with others on your computer through the Internet.     Join a games club--anything from card games to chess or checkers or lawn bowling.     Start a new hobby.     Go back to school.     Volunteer.     Read.   Keep up with world events.    Exercise for a Healthier Heart  You may wonder how you can improve the health of your heart. If you re thinking about exercise, you re on the right track. You don t need to become an athlete. But you do need a certain amount of brisk exercise to help strengthen your heart. If you have been diagnosed with a heart condition, your healthcare  provider may advise exercise to help stabilize your condition. To help make exercise a habit, choose safe, fun activities.      Exercise with a friend. When activity is fun, you're more likely to stick with it.   Before you start  Check with your healthcare provider before starting an exercise program. This is especially important if you have not been active for a while. It's also important if you have a long-term (chronic) health problem such as heart disease, diabetes, or obesity. Or if you are at high risk for having these problems.   Why exercise?  Exercising regularly offers many healthy rewards. It can help you do all of the following:     Improve your blood cholesterol level to help prevent further heart trouble    Lower your blood pressure to help prevent a stroke or heart attack    Control diabetes, or reduce your risk of getting this disease    Improve your heart and lung function    Reach and stay at a healthy weight    Make your muscles stronger so you can stay active    Prevent falls and fractures by slowing the loss of bone mass (osteoporosis)    Manage stress better    Reduce your blood pressure    Improve your sense of self and your body image  Exercise tips      Ease into your routine. Set small goals. Then build on them. If you are not sure what your activity level should be, talk with your healthcare provider first before starting an exercise routine.    Exercise on most days. Aim for a total of 150 minutes (2 hours and 30 minutes) or more of moderate-intensity aerobic activity each week. Or 75 minutes (1 hour and 15 minutes) or more of vigorous-intensity aerobic activity each week. Or try for a combination of both. Moderate activity means that you breathe heavier and your heart rate increases but you can still talk. Think about doing 40 minutes of moderate exercise, 3 to 4 times a week. For best results, activity should last for about 40 minutes to lower blood pressure and cholesterol. It's OK to  work up to the 40-minute period over time. Examples of moderate-intensity activity are walking 1 mile in 15 minutes. Or doing 30 to 45 minutes of yard work.    Step up your daily activity level.  Along with your exercise program, try being more active the whole day. Walk instead of drive. Or park further away so that you take more steps each day. Do more household tasks or yard work. You may not be able to meet the advised mount of physical activity. But doing some moderate- or vigorous-intensity aerobic activity can help reduce your risk for heart disease. Your healthcare provider can help you figure out what is best for you.    Choose 1 or more activities you enjoy.  Walking is one of the easiest things you can do. You can also try swimming, riding a bike, dancing, or taking an exercise class.    When to call your healthcare provider  Call your healthcare provider if you have any of these:     Chest pain or feel dizzy or lightheaded    Burning, tightness, pressure, or heaviness in your chest, neck, shoulders, back, or arms    Abnormal shortness of breath    More joint or muscle pain    A very fast or irregular heartbeat (palpitations)  [employer] last reviewed this educational content on 7/1/2019 2000-2021 [employer]. All rights reserved. This information is not intended as a substitute for professional medical care. Always follow your healthcare professional's instructions.          Understanding USDA MyPlate  The USDA has guidelines to help you make healthy food choices. These are called MyPlate. MyPlate shows the food groups that make up healthy meals using the image of a place setting. Before you eat, think about the healthiest choices for what to put on your plate or in your cup or bowl. To learn more about building a healthy plate, visit www.choosemyplate.gov.    The food groups    Fruits. Any fruit or 100% fruit juice counts as part of the Fruit Group. Fruits may be fresh, canned, frozen, or  dried, and may be whole, cut-up, or pureed. Make 1/2 of your plate fruits and vegetables.    Vegetables. Any vegetable or 100% vegetable juice counts as a member of the Vegetable Group. Vegetables may be fresh, frozen, canned, or dried. They can be served raw or cooked and may be whole, cut-up, or mashed. Make 1/2 of your plate fruits and vegetables.    Grains. All foods made from grains are part of the Grains Group. These include wheat, rice, oats, cornmeal, and barley. Grains are often used to make foods such as bread, pasta, oatmeal, cereal, tortillas, and grits. Grains should be no more than 1/4 of your plate. At least half of your grains should be whole grains.    Protein. This group includes meat, poultry, seafood, beans and peas, eggs, processed soy products (such as tofu), nuts (including nut butters), and seeds. Make protein choices no more than 1/4 of your plate. Meat and poultry choices should be lean or low fat.    Dairy. The Dairy Group includes all fluid milk products and foods made from milk that contain calcium, such as yogurt and cheese. (Foods that have little calcium, such as cream, butter, and cream cheese, are not part of this group.) Most dairy choices should be low-fat or fat-free.    Oils. Oils aren't a food group, but they do contain essential nutrients. However it's important to watch your intake of oils. These are fats that are liquid at room temperature. They include canola, corn, olive, soybean, vegetable, and sunflower oil. Foods that are mainly oil include mayonnaise, certain salad dressings, and soft margarines. You likely already get your daily oil allowance from the foods you eat.  Things to limit  Eating healthy also means limiting these things in your diet:       Salt (sodium). Many processed foods have a lot of sodium. To keep sodium intake down, eat fresh vegetables, meats, poultry, and seafood when possible. Purchase low-sodium, reduced-sodium, or no-salt-added food products at  the store. And don't add salt to your meals at home. Instead, season them with herbs and spices such as dill, oregano, cumin, and paprika. Or try adding flavor with lemon or lime zest and juice.    Saturated fat. Saturated fats are most often found in animal products such as beef, pork, and chicken. They are often solid at room temperature, such as butter. To reduce your saturated fat intake, choose leaner cuts of meat and poultry. And try healthier cooking methods such as grilling, broiling, roasting, or baking. For a simple lower-fat swap, use plain nonfat yogurt instead of mayonnaise when making potato salad or macaroni salad.    Added sugars. These are sugars added to foods. They are in foods such as ice cream, candy, soda, fruit drinks, sports drinks, energy drinks, cookies, pastries, jams, and syrups. Cut down on added sugars by sharing sweet treats with a family member or friend. You can also choose fruit for dessert, and drink water or other unsweetened beverages.     [employer] last reviewed this educational content on 6/1/2020 2000-2021 [employer]. All rights reserved. This information is not intended as a substitute for professional medical care. Always follow your healthcare professional's instructions.          Signs of Hearing Loss      Hearing much better with one ear can be a sign of hearing loss.   Hearing loss is a problem shared by many people. In fact, it is one of the most common health problems, particularly as people age. Most people age 65 and older have some hearing loss. By age 80, almost everyone does. Hearing loss often occurs slowly over the years. So you may not realize your hearing has gotten worse.  Have your hearing checked  Call your healthcare provider if you:    Have to strain to hear normal conversation    Have to watch other people s faces very carefully to follow what they re saying    Need to ask people to repeat what they ve said    Often misunderstand what  people are saying    Turn the volume of the television or radio up so high that others complain    Feel that people are mumbling when they re talking to you    Find that the effort to hear leaves you feeling tired and irritated    Notice, when using the phone, that you hear better with one ear than the other  Aleisha last reviewed this educational content on 1/1/2020 2000-2021 [employer]. All rights reserved. This information is not intended as a substitute for professional medical care. Always follow your healthcare professional's instructions.

## 2022-02-14 DIAGNOSIS — N30.01 ACUTE CYSTITIS WITH HEMATURIA: Primary | ICD-10-CM

## 2022-02-14 LAB — BACTERIA UR CULT: ABNORMAL

## 2022-02-14 RX ORDER — CIPROFLOXACIN 250 MG/1
250 TABLET, FILM COATED ORAL 2 TIMES DAILY
Qty: 10 TABLET | Refills: 0 | Status: SHIPPED | OUTPATIENT
Start: 2022-02-14 | End: 2022-02-19

## 2022-02-14 NOTE — TELEPHONE ENCOUNTER
Patient Contact    Attempt # 1    Was call answered?  No.  Left detailed message that ok to start cipro tonight. Advised to call clinic back with questions.       Sheeba Manjarrez RN

## 2022-02-14 NOTE — TELEPHONE ENCOUNTER
Lulu -     Discussed with pt who will plan to start cipro.   Is taking clindamycin as pre-med for a dental visit today at 1pm.    Is it ok for pt to start taking cipro this evening?    Pt would like call back verifying, detailed vm ok.    [phone]    Kassie Alvarado, DEANDRE  [facility] RN Triage Team

## 2022-04-11 ENCOUNTER — OFFICE VISIT (OUTPATIENT)
Dept: OBGYN | Facility: CLINIC | Age: 82
End: 2022-04-11
Payer: MEDICARE

## 2022-04-11 VITALS
DIASTOLIC BLOOD PRESSURE: 78 MMHG | WEIGHT: 184.4 LBS | BODY MASS INDEX: 30.69 KG/M2 | HEART RATE: 70 BPM | SYSTOLIC BLOOD PRESSURE: 130 MMHG

## 2022-04-11 DIAGNOSIS — N81.6 RECTOCELE: Primary | ICD-10-CM

## 2022-04-11 DIAGNOSIS — N81.11 CYSTOCELE, MIDLINE: ICD-10-CM

## 2022-04-11 DIAGNOSIS — N95.0 POST-MENOPAUSAL BLEEDING: ICD-10-CM

## 2022-04-11 DIAGNOSIS — N95.2 VAGINAL ATROPHY: ICD-10-CM

## 2022-04-11 PROCEDURE — 99205 OFFICE O/P NEW HI 60 MIN: CPT | Performed by: OBSTETRICS & GYNECOLOGY

## 2022-04-11 NOTE — NURSING NOTE
"Chief Complaint   Patient presents with     Consult     Possible prolapse  Referred by PCP  JIMMY PX  2022   Occasional spotting when wipe         Initial /78 (BP Location: Left arm, Cuff Size: Adult Regular)   Pulse 70   Wt 83.6 kg (184 lb 6.4 oz)   LMP  (LMP Unknown)   Breastfeeding No   BMI 30.69 kg/m   Estimated body mass index is 30.69 kg/m  as calculated from the following:    Height as of 22: 1.651 m (5' 5\").    Weight as of this encounter: 83.6 kg (184 lb 6.4 oz).  BP completed using cuff size: regular    Questioned patient about current smoking habits.  Pt. has never smoked.             The following HM Due: NONE      Aurora Jessica CMA on 2022 at 10:09 AM                 "

## 2022-04-11 NOTE — PROGRESS NOTES
Patient seen in consultation at the request of Nadine HERNÁNDEZ CNP for pelvic organ prolapse.       Ms. Aurora Figueroa 82 year old P6 (5 vaginal deliveries followed by 1 ) presents for mass in her vagina that she suspects is a prolapse. She has been experiencing this for about 6 months.   She still reports stains on toilet paper with wiping following urinary void which is brown in color. More recently, she has seen brown to pink stains on her underwear without wiping. She has been worked-up for this in the past for postmenopausal bleeding with a pelvic ultrasound done in 2019 which showed a 3 mm endometrial stripe. Based on this finding, an endometrial biopsy was deferred and the likely explanation for her postmenopausal bleeding is vaginal atrophy. She also acknowledges recurrent UTI treatment for vague abdominal and low pelvic pain discomfort.   At any rate, with regards to her prolapse, the one other associated symptom has been thinner caliber to her stools with bowel movements. She denies digital evacuation of stool. She denies dysuria, hematuria, urinary incontinence, incomplete bladder emptying and urinary retention.   Of note, she is not sexually active.       Past Medical History:   Diagnosis Date     Arrhythmia     tachycardia intermittant (metoprolol)     Basal cell carcinoma      Gastroesophageal reflux disease      Hyperlipidemia      Hypertension      Hypothyroidism      LUMBOSACRAL NEURITIS NOS 2007     Pemphigus 2015     Sleep apnea     does not wear CPAP (resolved after gastric bypass surgery)     Squamous cell carcinoma      Tachycardia        Past Surgical History:   Procedure Laterality Date     ABDOMEN SURGERY  2009    gastric bypass 2009     APPENDECTOMY  1970    1970     ARTHROPLASTY HIP Right 2021    Procedure: Right total hip arthroplasty ([employer]hew 52 mm acetabulum;; #6 Anthology; +0 neck 32 mm ceramic head);  Surgeon: Robbie Gamble MD;   Location:  OR     ARTHROPLASTY REVISION KNEE Right 2/4/2016    Procedure: ARTHROPLASTY REVISION KNEE;  Surgeon: Vincent Allen MD;  Location:  OR     COLONOSCOPY  7/14/2014    Procedure: COLONOSCOPY;  Surgeon: Jamari Jj MD;  Location:  GI     ENT SURGERY  1947    tosillectomy      EYE SURGERY  2011    macular repair 2011 left eye     EYE SURGERY  2012    cataract removal left eye.     GYN SURGERY  1978    c section     HERNIA REPAIR  1990    umbilical 1990     ORTHOPEDIC SURGERY  1993, 1995    arthroscopic both knees     ORTHOPEDIC SURGERY  1996    Bilateral TKA     ORTHOPEDIC SURGERY  2008    Left hip replacement.     SOFT TISSUE SURGERY  2019    basal cell carcinoma removed from face, several spots       Current Outpatient Medications   Medication     Cholecalciferol (VITAMIN D) 1000 UNITS capsule     Cyanocobalamin (VITAMIN B 12 PO)     ferrous sulfate (FEROSUL) 325 (65 Fe) MG tablet     levothyroxine (SYNTHROID/LEVOTHROID) 50 MCG tablet     methylcellulose (CITRUCEL) powder     metoprolol succinate ER (TOPROL-XL) 25 MG 24 hr tablet     Multiple Vitamins-Minerals (ICAPS AREDS 2 PO)     OMEPRAZOLE PO     Pediatric Multivit-Minerals-C (FLINTSTONES COMPLETE PO)     simvastatin (ZOCOR) 40 MG tablet     acetaminophen (TYLENOL) 325 MG tablet     diphenhydrAMINE (BENADRYL) 25 MG tablet     senna-docusate (SENOKOT-S/PERICOLACE) 8.6-50 MG tablet     No current facility-administered medications for this visit.       Allergies   Allergen Reactions     Food Itching     Raw fruit & veg:  Apples, cherries, cantalope, tomatoes,  carrotts.  Eye & throat irritations.     Penicillins Hives     Fruit [Cherry]      Hives, itchy throat tomatoes     Nsaids Other (See Comments)     Patient had gastric bypass surgery.  Should not take oral  NSAID's ever.     Animal Dander      Pruritis,itchi eyes, throat and ears.     E.E.S. GI Disturbance     Erythromycin GI Disturbance     Pollen Extract Itching       Social History      Tobacco Use     Smoking status: Never Smoker     Smokeless tobacco: Never Used   Substance Use Topics     Alcohol use: Yes     Alcohol/week: 0.0 standard drinks     Comment: rare     Drug use: No       Family History   Problem Relation Age of Onset     Heart Disease Mother      Skin Cancer Mother      Heart Disease Son      Unknown/Adopted Father      Heart Disease Sister      Skin Cancer Sister      Skin Cancer Brother      Diabetes No family hx of      Coronary Artery Disease No family hx of      Hypertension No family hx of      Hyperlipidemia No family hx of      Cerebrovascular Disease No family hx of      Breast Cancer No family hx of      Colon Cancer No family hx of      Prostate Cancer No family hx of      Other Cancer No family hx of      Depression No family hx of      Anxiety Disorder No family hx of      Mental Illness No family hx of      Substance Abuse No family hx of      Anesthesia Reaction No family hx of      Asthma No family hx of      Osteoporosis No family hx of      Genetic Disorder No family hx of      Thyroid Disease No family hx of      Obesity No family hx of        C: NEGATIVE for fever, chills, change in weight  HEENT: NEGATIVE for visual changes, runny nose, epistaxis, ear pain, tinnitus, tooth ache, sore throat, difficulty with swallowing, sinus pain  R: NEGATIVE for significant cough or SOB  CV: NEGATIVE for chest pain, palpitations or peripheral edema  BREAST: NEGATIVE For soreness, pain, lumps or nipple discharge  GI: NEGATIVE for nausea, abdominal pain, heartburn, or change in bowel habits  : NEGATIVE for frequency, dysuria, hematuria, vaginal discharge, or irregular bleeding  Neuro: NEGATIVE for numbness, tingling, focal weakness, or headache  INT: NEGATIVE for rashes, lesions or pruritis   PSYCH: NEGATIVE for anxiety, depression, or halluciinations    Exam:   /78 (BP Location: Left arm, Cuff Size: Adult Regular)   Pulse 70   Wt 83.6 kg (184 lb 6.4 oz)   LMP  (LMP  Unknown)   Breastfeeding No   BMI 30.69 kg/m    General Appearance: Well nourished, well developed female, NAD, AOx3  Neurological: Mental Status Normal  HEET: Atraumatic, normocephalic  Pelvic: External female genitalia remarkable for atrophy  No external lesions, normal hair distribution, no adenopathy. Speculum exam reveals atrophic vaginal epithelium with no abnormal discharge. Cervix appears attenuated, smooth, pink with no visible lesions. There is grade 1 cystocele, grade 2 rectocele and minimal apical/cervix prolapse with Valsalva. Bimanual exam reveals normal size uterus, anteverted, non-tender, and mobile. No adnexal masses or tenderness. No cervical motion tenderness.       A/P:   1) Rectocele, cystocele  -- reviewed findings of pelvic exam  -- reviewed pathophysiology of this condition as well as its relation to risk factors (hx of vaginal deliveries)   -- reviewed therapies spanning conservative measures such as pelvic physical therapy and/or pessary fitting as well as surgical measures performed by urogynecologist; I provided Dr. Bernal contact to discuss these surgical interventions further, if she is a candidate for it; if her medical insurance coverage is more amenable to [facility] referral, then she can call back to have this ordered for her  -- patient at this time expresses interest in pessary fitting, will make follow-up appointment for fitting   -- will consider pelvic physical therapy and call for referral     2)  Postmenopausal bleeding  -- given her clinical exam remarkable for atrophy, I suspect that this is what is likely contributing to her symptoms  -- reviewed pathophysiology of postmenopausal bleeding and causes  -- will order pelvic ultrasound to ensure ongoing thin endometrium which provided a high negative predictive value in ruling out endometrial hyperplasia and malignancy   -- if endometrial stripe is greater than 3 mm, then recommended endometrial sampling  --  bleeding precautions reviewed    3) Vaginal atrophy  -- recommended topical estrogen therapy; reviewed rationale for treatment and its safety with regards to low systemic absorption   -- premarin cream prescribed for twice weekly administration     Total time spent was 60 minutes on the date of the encounter in chart review, patient visit, review of tests, documentation and counseling on the above medical conditions.    Octaviano Gustafson MD  [facility]

## 2022-04-21 ENCOUNTER — ANCILLARY PROCEDURE (OUTPATIENT)
Dept: ULTRASOUND IMAGING | Facility: CLINIC | Age: 82
End: 2022-04-21
Attending: OBSTETRICS & GYNECOLOGY
Payer: MEDICARE

## 2022-04-21 DIAGNOSIS — N95.0 POST-MENOPAUSAL BLEEDING: ICD-10-CM

## 2022-04-21 PROCEDURE — 76830 TRANSVAGINAL US NON-OB: CPT | Performed by: OBSTETRICS & GYNECOLOGY

## 2022-04-21 PROCEDURE — 76856 US EXAM PELVIC COMPLETE: CPT | Performed by: OBSTETRICS & GYNECOLOGY

## 2022-04-28 ENCOUNTER — TELEPHONE (OUTPATIENT)
Dept: FAMILY MEDICINE | Facility: CLINIC | Age: 82
End: 2022-04-28
Payer: MEDICARE

## 2022-04-28 NOTE — TELEPHONE ENCOUNTER
"Received a call from the patient stating she tested positive for COVID this morning and patient is wondering if there is anything she needs to do? Patient states currently she just has mild symptoms (scratchy throat and \"feels like she has a cold\"). Informed patient if she has mild symptoms rest, fluids, and isolation. If her symptoms get worse, phone number provided to the patient to call and schedule a virtual visit (informed patient visit must take place within 5 days of testing positive).    Patient expressed understanding and had no additional questions at this time.    Aleida Thornton RN    "

## 2022-05-04 ENCOUNTER — NURSE TRIAGE (OUTPATIENT)
Dept: NURSING | Facility: CLINIC | Age: 82
End: 2022-05-04
Payer: MEDICARE

## 2022-05-04 NOTE — TELEPHONE ENCOUNTER
Tested positive for covid last week.   had covid recently too.  Her symptoms have been mild and she has seasonal allergies so thought at first it was allergies.  No fever.  Only symptoms today is watery eyes.  First symptoms 4/27/22.    Retested with home antigen test and it is positive.  Asking when she can stop isolating.  Recommendations given:  Minimum of 5 days since first symptoms.  Fever free for 24 or more hours without taking fever reducing medication.  Symptoms improved.  Wear a mask for 5 days once you're no longer quarantining.    Has had two vaccines and two boosters.    Caller stated understanding and agreement.    [facility]       Reason for Disposition    [1] COVID-19 diagnosed by positive lab test (e.g., PCR, rapid self-test kit) AND [2] mild symptoms (e.g., cough, fever, others) AND [3] no complications or SOB    Protocols used: CORONAVIRUS (COVID-19) DIAGNOSED OR [id number] 1.18.2022    [facility]

## 2022-05-23 ENCOUNTER — OFFICE VISIT (OUTPATIENT)
Dept: OBGYN | Facility: CLINIC | Age: 82
End: 2022-05-23
Payer: MEDICARE

## 2022-05-23 VITALS
HEIGHT: 65 IN | WEIGHT: 187.6 LBS | BODY MASS INDEX: 31.25 KG/M2 | DIASTOLIC BLOOD PRESSURE: 70 MMHG | SYSTOLIC BLOOD PRESSURE: 116 MMHG

## 2022-05-23 DIAGNOSIS — Z46.89 ENCOUNTER FOR FITTING AND ADJUSTMENT OF PESSARY: Primary | ICD-10-CM

## 2022-05-23 DIAGNOSIS — N95.2 VAGINAL ATROPHY: ICD-10-CM

## 2022-05-23 PROCEDURE — A4562 PESSARY, NON RUBBER,ANY TYPE: HCPCS | Performed by: OBSTETRICS & GYNECOLOGY

## 2022-05-23 PROCEDURE — 99213 OFFICE O/P EST LOW 20 MIN: CPT | Mod: 25 | Performed by: OBSTETRICS & GYNECOLOGY

## 2022-05-23 PROCEDURE — 57160 INSERT PESSARY/OTHER DEVICE: CPT | Performed by: OBSTETRICS & GYNECOLOGY

## 2022-05-23 RX ORDER — ESTRADIOL 10 UG/1
10 INSERT VAGINAL
Qty: 30 TABLET | Refills: 2 | Status: SHIPPED | OUTPATIENT
Start: 2022-05-23 | End: 2023-04-06

## 2022-05-23 RX ORDER — OXYQUINOLINE/BORIC ACID 0.025 %
1 JELLY WITH APPLICATOR (GRAM) VAGINAL DAILY
Qty: 113.4 G | Refills: 1 | Status: SHIPPED | OUTPATIENT
Start: 2022-05-23 | End: 2023-04-06

## 2022-05-23 NOTE — NURSING NOTE
"Chief Complaint   Patient presents with     Pessary Check/Fit/Insert     Insurance didn't cover premarin cream--needs something different       Initial /70   Ht 1.651 m (5' 5\")   Wt 85.1 kg (187 lb 9.6 oz)   LMP  (LMP Unknown)   BMI 31.22 kg/m   Estimated body mass index is 31.22 kg/m  as calculated from the following:    Height as of this encounter: 1.651 m (5' 5\").    Weight as of this encounter: 85.1 kg (187 lb 9.6 oz).  BP completed using cuff size: regular long    Questioned patient about current smoking habits.  Pt. has never smoked.          The following HM Due: NONE           "

## 2022-05-23 NOTE — PROGRESS NOTES
"Hx of rectocele an cystocele   Pessary fitting     Ms. Aurora Figueroa 82 year old presents for pessary fitting.   She re-iterates that prolapse is most notable with having bowel movements, voiding and doing chores in her place of residence.   She also states that she was not able to  her prescription for premarin because her medical insurance did not cover it.   She otherwise is doing well and has no complaints.     /70   Ht 1.651 m (5' 5\")   Wt 85.1 kg (187 lb 9.6 oz)   LMP  (LMP Unknown)   BMI 31.22 kg/m      General Appearance: NAD  Pelvic: External female genitalia remarkable for atrophy  No external lesions, normal hair distribution, no adenopathy. Speculum exam reveals atrophic vaginal epithelium with no abnormal discharge. Cervix appears attenuated, smooth, pink with no visible lesions. There is grade 1 cystocele, grade 2 rectocele and minimal apical/cervix prolapse with Valsalva. Bimanual exam is deferred.     Procedure: Pessary fitting  The patient while in dorsal lithotomy position had a size 4 ring pessary inserted without difficulty. With Valsalva in both the supine and the upright position, pessary remained in place.       A/P:   1) Encounter for pessary fitting and adjustment  -- patient was counseled and provided instructions with removal and insertion   -- she was counseled on signs and symptoms of vaginal wall erosion caused by pessary and to return if this happens  -- patient expresses compliance with daily removal and insertion at home, but instructed patient to return in 4-6 months for inspection of vaginal wall to ensure that there are no erosions/ damage to vaginal epithelium  -- trimo-cano gel prescribed    2) Vaginal atrophy  -- vagifem tablets prescribed as an alternative to premarin with the hope that it is covered by her medical insurance coverage and is affordable    Total time spent was 20 minutes on the date of the encounter in chart review, patient visit, review " of tests, documentation and counseling on the above medical condition, not including time spent on the procedure above.     Octaviano Gustafson MD  [facility]

## 2022-05-26 ENCOUNTER — TELEPHONE (OUTPATIENT)
Dept: OBGYN | Facility: CLINIC | Age: 82
End: 2022-05-26
Payer: MEDICARE

## 2022-05-26 DIAGNOSIS — N95.2 VAGINAL ATROPHY: Primary | ICD-10-CM

## 2022-05-26 NOTE — TELEPHONE ENCOUNTER
Fax from [facility] stating the at [address] is not covered by insurance.  Alternative that is covered is Estradiol 0.01% cream.    Please send if appropriate.    Gia Kumar RN

## 2022-05-31 RX ORDER — ESTRADIOL 0.1 MG/G
2 CREAM VAGINAL
Qty: 42.5 G | Refills: 3 | Status: SHIPPED | OUTPATIENT
Start: 2022-06-02 | End: 2023-04-06

## 2022-08-09 ENCOUNTER — OFFICE VISIT (OUTPATIENT)
Dept: DERMATOLOGY | Facility: CLINIC | Age: 82
End: 2022-08-09
Payer: MEDICARE

## 2022-08-09 DIAGNOSIS — D48.5 NEOPLASM OF UNCERTAIN BEHAVIOR OF SKIN: ICD-10-CM

## 2022-08-09 DIAGNOSIS — D18.01 ANGIOMA OF SKIN: ICD-10-CM

## 2022-08-09 DIAGNOSIS — L81.4 LENTIGO: ICD-10-CM

## 2022-08-09 DIAGNOSIS — Z85.828 HISTORY OF SKIN CANCER: ICD-10-CM

## 2022-08-09 DIAGNOSIS — L57.0 ACTINIC KERATOSIS: Primary | ICD-10-CM

## 2022-08-09 DIAGNOSIS — D22.9 NEVUS: ICD-10-CM

## 2022-08-09 DIAGNOSIS — L82.1 SEBORRHEIC KERATOSIS: ICD-10-CM

## 2022-08-09 PROCEDURE — 88305 TISSUE EXAM BY PATHOLOGIST: CPT | Performed by: DERMATOLOGY

## 2022-08-09 PROCEDURE — 11102 TANGNTL BX SKIN SINGLE LES: CPT | Performed by: PHYSICIAN ASSISTANT

## 2022-08-09 PROCEDURE — 99213 OFFICE O/P EST LOW 20 MIN: CPT | Mod: 25 | Performed by: PHYSICIAN ASSISTANT

## 2022-08-09 ASSESSMENT — PAIN SCALES - GENERAL: PAINLEVEL: NO PAIN (0)

## 2022-08-09 NOTE — PROGRESS NOTES
HPI:   chief complaint: Aurora Figueroa is a 82 year old female who presents for Full skin cancer screening to rule out skin cancer.  Last Skin Exam: 3 years ago      1st Baseline: no  Personal HX of Skin Cancer: BCC on left forehead 2018 and SCC on nose 2019   Personal HX of Malignant Melanoma: none   Family HX of Skin Cancer / Malignant Melanoma: none  Personal HX of Atypical Moles: none  Risk factors: frequent sun exposure in youth, blistering sunburns in youth   New / Changing lesions: yes new scaly lesions on the nose  Social History: grew up in [place]; has 6 children. Travels to [place] to visit her 3 daughters.  On review of systems, there are no further skin complaints, patient is feeling otherwise well.  See patient intake sheet.  ROS of the following were done and are negative: Constitutional, Eyes, Ears, Nose,   Mouth, Throat, Cardiovascular, Respiratory, GI, Genitourinary, Musculoskeletal,   Psychiatric, Endocrine, Allergic/Immunologic.      PHYSICAL EXAM:   LMP  (LMP Unknown)   Breastfeeding No   Skin exam performed as follows: Type 2 skin. Mood appropriate  Alert and Oriented X 3. Well developed, well nourished in no distress.  General appearance: Normal  Head including face: Normal  Eyes: conjunctiva and lids: Normal  Mouth: Lips, teeth, gums: Normal  Neck: Normal  Chest-breast/axillae: Normal  Back: Normal  Spleen and liver: Normal  Cardiovascular: Exam of peripheral vascular system by observation for swelling, varicosities, edema: Normal  Genitalia: groin, buttocks: Normal  Extremities: digits/nails (clubbing): Normal  Eccrine and Apocrine glands: Normal  Right upper extremity: Normal  Left upper extremity: Normal  Right lower extremity: Normal  Left lower extremity: Normal  Skin: Scalp and body hair: See below    Pt deferred exam of breasts, groin, buttocks: No    Other physical findings:  1. Multiple pigmented macules on extremities and trunk  2. Multiple pigmented macules on face, trunk  and extremities  3. Multiple vascular papules on trunk, arms and legs  4. Multiple scattered keratotic plaques  5. 6 mm pink scaly papule on the left forearm  6. Numerous pink gritty papules on the face > 15 in number        Except as noted above, no other signs of skin cancer or melanoma.     ASSESSMENT/PLAN:   Benign Full skin cancer screening today.     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    Multiple benign appearing nevi on arms, legs and trunk. Discussed ABCDEs of melanoma and sunscreen.   Multiple lentigos on arms, legs and trunk. Advised benign, no treatment needed.  Multiple scattered angiomas. Advised benign, no treatment needed.   Seborrheic keratosis on arms, legs and trunk. Advised benign, no treatment needed.  R/o BCC on the left forearm. Shave biopsy performed.  Area cleaned and anesthetized with 1% lidocaine with epinephrine.  Dermablade used to remove the lesion and sent to pathology. Bleeding was cauterized. Pt tolerated procedure well with no complications.   Numerous actinic keratoses - discussed Efudex vs ln2 vs PDT and she will schedule for PDT.             Follow-up: pending path/yearly FSE/PRN sooner     1.) Patient was asked about new and changing moles. YES  2.) Patient received a complete physical skin examination: YES  3.) Patient was counseled to perform a monthly self skin examination: YES  Scribed By: Marely Aaron, MS, PACOLLEEN

## 2022-08-09 NOTE — LETTER
8/9/2022         RE: Aurora Figueroa  [address] [address]        Dear Colleague,    Thank you for referring your patient, Aurora Figueroa, to the [facility]. Please see a copy of my visit note below.    HPI:   chief complaint: Aurora Figueroa is a 82 year old female who presents for Full skin cancer screening to rule out skin cancer.  Last Skin Exam: 3 years ago      1st Baseline: no  Personal HX of Skin Cancer: BCC on left forehead 2018 and SCC on nose 2019   Personal HX of Malignant Melanoma: none   Family HX of Skin Cancer / Malignant Melanoma: none  Personal HX of Atypical Moles: none  Risk factors: frequent sun exposure in youth, blistering sunburns in youth   New / Changing lesions: yes new scaly lesions on the nose  Social History: grew up in [place]; has 6 children. Travels to [place] to visit her 3 daughters.  On review of systems, there are no further skin complaints, patient is feeling otherwise well.  See patient intake sheet.  ROS of the following were done and are negative: Constitutional, Eyes, Ears, Nose,   Mouth, Throat, Cardiovascular, Respiratory, GI, Genitourinary, Musculoskeletal,   Psychiatric, Endocrine, Allergic/Immunologic.      PHYSICAL EXAM:   LMP  (LMP Unknown)   Breastfeeding No   Skin exam performed as follows: Type 2 skin. Mood appropriate  Alert and Oriented X 3. Well developed, well nourished in no distress.  General appearance: Normal  Head including face: Normal  Eyes: conjunctiva and lids: Normal  Mouth: Lips, teeth, gums: Normal  Neck: Normal  Chest-breast/axillae: Normal  Back: Normal  Spleen and liver: Normal  Cardiovascular: Exam of peripheral vascular system by observation for swelling, varicosities, edema: Normal  Genitalia: groin, buttocks: Normal  Extremities: digits/nails (clubbing): Normal  Eccrine and Apocrine glands: Normal  Right upper extremity: Normal  Left upper extremity: Normal  Right  lower extremity: Normal  Left lower extremity: Normal  Skin: Scalp and body hair: See below    Pt deferred exam of breasts, groin, buttocks: No    Other physical findings:  1. Multiple pigmented macules on extremities and trunk  2. Multiple pigmented macules on face, trunk and extremities  3. Multiple vascular papules on trunk, arms and legs  4. Multiple scattered keratotic plaques  5. 6 mm pink scaly papule on the left forearm  6. Numerous pink gritty papules on the face > 15 in number        Except as noted above, no other signs of skin cancer or melanoma.     ASSESSMENT/PLAN:   Benign Full skin cancer screening today.     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    Multiple benign appearing nevi on arms, legs and trunk. Discussed ABCDEs of melanoma and sunscreen.   Multiple lentigos on arms, legs and trunk. Advised benign, no treatment needed.  Multiple scattered angiomas. Advised benign, no treatment needed.   Seborrheic keratosis on arms, legs and trunk. Advised benign, no treatment needed.  R/o BCC on the left forearm. Shave biopsy performed.  Area cleaned and anesthetized with 1% lidocaine with epinephrine.  Dermablade used to remove the lesion and sent to pathology. Bleeding was cauterized. Pt tolerated procedure well with no complications.   Numerous actinic keratoses - discussed Efudex vs ln2 vs PDT and she will schedule for PDT.             Follow-up: pending path/yearly FSE/PRN sooner     1.) Patient was asked about new and changing moles. YES  2.) Patient received a complete physical skin examination: YES  3.) Patient was counseled to perform a monthly self skin examination: YES  Scribed By: Marely Aaron, MS, PACOLLEEN        Again, thank you for allowing me to participate in the care of your patient.        Sincerely,        Marely Aaron PA-C

## 2022-08-09 NOTE — PATIENT INSTRUCTIONS
Wound Care Instructions     FOR SUPERFICIAL WOUNDS     [facility]  [phone]                 AFTER 24 HOURS YOU SHOULD REMOVE THE BANDAGE AND BEGIN DAILY DRESSING CHANGES AS FOLLOWS:     1) Remove Dressing.     2) Clean and dry the area with tap water using a Q-tip or sterile gauze pad.     3) Apply Vaseline, Aquaphor, Polysporin ointment or Bacitracin ointment over entire wound.  Do NOT use Neosporin ointment.     4) Cover the wound with a band-aid, or a sterile non-stick gauze pad and micropore paper tape    REPEAT THESE INSTRUCTIONS AT LEAST ONCE A DAY UNTIL THE WOUND HAS COMPLETELY HEALED.    It is an old wives tale that a wound heals better when it is exposed to air and allowed to dry out. The wound will heal faster with a better cosmetic result if it is kept moist with ointment and covered with a bandage.    **Do not let the wound dry out.**    Supplies Needed:      *Cotton tipped applicators (Q-tips)    *Vaseline, Aquaphor, Polysporin or Bacitracin Ointment (NOT NEOSPORIN)    *Band-aids or non-stick gauze pads and micropore paper tape.      PATIENT INFORMATION:    During the healing process you will notice a number of changes. All wounds develop a small halo of redness surrounding the wound.  This means healing is occurring. Severe itching with extensive redness usually indicates sensitivity to the ointment or bandage tape used to dress the wound.  You should call our office if this develops.      Swelling  and/or discoloration around your surgical site is common, particularly when performed around the eye.    All wounds normally drain.  The larger the wound the more drainage there will be.  After 7-10 days, you will notice the wound beginning to shrink and new skin will begin to grow.  The wound is healed when you can see skin has formed over the entire area.  A healed wound has a healthy, shiny look to the surface and is red to dark pink in color to normalize.  Wounds may  take approximately 4-6 weeks to heal.  Larger wounds may take 6-8 weeks.  After the wound is healed you may discontinue dressing changes.    You may experience a sensation of tightness as your wound heals. This is normal and will gradually subside.    Your healed wound may be sensitive to temperature changes. This sensitivity improves with time, but if you re having a lot of discomfort, try to avoid temperature extremes.    Patients frequently experience itching after their wound appears to have healed because of the continue healing under the skin.  Plain Vaseline will help relieve the itching.      POSSIBLE COMPLICATIONS    BLEEDING:    Leave the bandage in place.  Use tightly rolled up gauze or a cloth to apply direct pressure over the bandage for 30  minutes.  Reapply pressure for an additional 30 minutes if necessary  Use additional gauze and tape to maintain pressure once the bleeding has stopped.

## 2022-08-11 ENCOUNTER — TELEPHONE (OUTPATIENT)
Dept: DERMATOLOGY | Facility: CLINIC | Age: 82
End: 2022-08-11

## 2022-08-11 LAB
PATH REPORT.COMMENTS IMP SPEC: ABNORMAL
PATH REPORT.COMMENTS IMP SPEC: ABNORMAL
PATH REPORT.COMMENTS IMP SPEC: YES
PATH REPORT.FINAL DX SPEC: ABNORMAL
PATH REPORT.GROSS SPEC: ABNORMAL
PATH REPORT.MICROSCOPIC SPEC OTHER STN: ABNORMAL
PATH REPORT.RELEVANT HX SPEC: ABNORMAL

## 2022-08-11 NOTE — TELEPHONE ENCOUNTER
----- Message from Marely Aaron PA-C sent at 8/11/2022  8:57 AM CDT -----  Left forearm BCC please schedule reexcision

## 2022-08-11 NOTE — TELEPHONE ENCOUNTER
Left a voicemail asking patient to give us a call back regarding results at our main dermatology line at [phone]    Nadiya IBARRA RN  [facility]  [phone]

## 2022-08-12 NOTE — TELEPHONE ENCOUNTER
Left message on answering machine for patient to call back.    Velma VASQUEZ RN  Dermatology   [phone]

## 2022-08-12 NOTE — TELEPHONE ENCOUNTER
Pt stopped by the clinic for her results. Results discussed and questions answered. Scheduled for excision.    Velma VASQUEZ RN  Dermatology   [phone]

## 2022-08-15 ENCOUNTER — OFFICE VISIT (OUTPATIENT)
Dept: DERMATOLOGY | Facility: CLINIC | Age: 82
End: 2022-08-15
Payer: MEDICARE

## 2022-08-15 DIAGNOSIS — D48.5 NEOPLASM OF UNCERTAIN BEHAVIOR OF SKIN: ICD-10-CM

## 2022-08-15 DIAGNOSIS — C44.619 BASAL CELL CARCINOMA (BCC) OF LEFT FOREARM: Primary | ICD-10-CM

## 2022-08-15 PROCEDURE — 12031 INTMD RPR S/A/T/EXT 2.5 CM/<: CPT | Performed by: STUDENT IN AN ORGANIZED HEALTH CARE EDUCATION/TRAINING PROGRAM

## 2022-08-15 PROCEDURE — 88305 TISSUE EXAM BY PATHOLOGIST: CPT | Performed by: DERMATOLOGY

## 2022-08-15 PROCEDURE — 11602 EXC TR-EXT MAL+MARG 1.1-2 CM: CPT | Performed by: STUDENT IN AN ORGANIZED HEALTH CARE EDUCATION/TRAINING PROGRAM

## 2022-08-15 NOTE — LETTER
8/15/2022         RE: Aurora Figueroa  [address] [facility] [address]        Dear Colleague,    Thank you for referring your patient, Aurora Figueroa, to the [facility]. Please see a copy of my visit note below.    DERMATOLOGIC SURGERY REPORT    NAME OF PROCEDURE:  EXCISION AND INTERMEDIATE CLOSURE    Surgeon:  Jimmy Ball MD    PREOPERATIVE DIAGNOSIS: smBCC  POSTOPERATIVE DIAGNOSIS: Same  FINAL EXCISION SIZE: 8 mm lesion with 4mm margins  TOTAL EXCISED DIAMETER: 1.6mm  FINAL REPAIR LENGTH:  20 mm    INDICATIONS:  This patient presented with a 8mm x 7 mm papule on the L forearm. Excision was indicated. We discussed the principles of treatment and most likely complications including bleeding, infection, wound dehiscence, pain, nerve damage, and scarring. Informed consent was obtained and the patient underwent the procedure as follows.    PROCEDURE:  The patient was taken to the operative suite. The treatment area was anesthetized with 1% lidocaine with 1:[id number] epinephrine buffered with bicarbonate. The area was washed with Hibiclens, rinsed with saline and draped with sterile towels. The lesion was delineated and excised down to subcutaneous fat. Hemostasis was obtained by electrocoagulation.   REPAIR:  In order to repair this defect while maintaining the normal anatomic relations and function, we elected to utilize an intermediate linear closure. Closure was oriented so that the wound was in the patient's natural skin tension lines. Deeper layers of the subcutaneous tissue were undermined first. Deep dermal and subcutaneous layer closure was performed using 5-0 monocryl deep, intradermal and subcutaneous sutures. Two redundant columns were removed by triangulation. Final cutaneous approximation was achieved with 5-0 fast gut simple running sutures.     The final wound length was 20mm.  A total of 3mL of anesthesia was administered for all surgical  sites. Estimated blood loss was less than 5mL for all surgical sites. A sterile pressure dressing was applied and wound care instructions, with a written handout, were given. The [facility] and ambulatory.    Jimmy Ball         Again, thank you for allowing me to participate in the care of your patient.        Sincerely,        Jimmy Ball MD

## 2022-08-15 NOTE — PROGRESS NOTES
DERMATOLOGIC SURGERY REPORT    NAME OF PROCEDURE:  EXCISION AND INTERMEDIATE CLOSURE    Surgeon:  Jimmy Ball MD    PREOPERATIVE DIAGNOSIS: [facility]  POSTOPERATIVE DIAGNOSIS: Same  FINAL EXCISION SIZE: 8 mm lesion with 4mm margins  TOTAL EXCISED DIAMETER: 1.6mm  FINAL REPAIR LENGTH:  20 mm    INDICATIONS:  This patient presented with a 8mm x 7 mm papule on the L forearm. Excision was indicated. We discussed the principles of treatment and most likely complications including bleeding, infection, wound dehiscence, pain, nerve damage, and scarring. Informed consent was obtained and the patient underwent the procedure as follows.    PROCEDURE:  The patient was taken to the operative suite. The treatment area was anesthetized with 1% lidocaine with [id number] epinephrine buffered with bicarbonate. The area was washed with Hibiclens, rinsed with saline and draped with sterile towels. The lesion was delineated and excised down to subcutaneous fat. Hemostasis was obtained by electrocoagulation.   REPAIR:  In order to repair this defect while maintaining the normal anatomic relations and function, we elected to utilize an intermediate linear closure. Closure was oriented so that the wound was in the patient's natural skin tension lines. Deeper layers of the subcutaneous tissue were undermined first. Deep dermal and subcutaneous layer closure was performed using 5-0 monocryl deep, intradermal and subcutaneous sutures. Two redundant columns were removed by triangulation. Final cutaneous approximation was achieved with 5-0 fast gut simple running sutures.     The final wound length was 20mm.  A total of 3mL of anesthesia was administered for all surgical sites. Estimated blood loss was less than 5mL for all surgical sites. A sterile pressure dressing was applied and wound care instructions, with a written handout, were given. The [facility] was [facility] alert and ambulatory.    Jimmy Ball

## 2022-08-15 NOTE — PATIENT INSTRUCTIONS
Excision Wound Care Instructions  I will experience scar, altered skin color, bleeding, swelling, pain, crusting and redness. I may experience altered sensation. Risks are excessive bleeding, infection, muscle weakness, thick (hypertrophic or keloidal) scar, and recurrence. A second procedure may be recommended to obtain the best cosmetic or functional result.  Possible complications of any surgical procedure are bleeding, infection, scarring, alteration in skin color and sensation, muscle weakness in the area, wound dehiscence or seperation, or recurrence of the lesion or disease. On occasion, after healing, a secondary procedure or revision may be recommended in order to obtain the best cosmetic or functional result.   After your surgery, a pressure bandage will be placed over the area that has sutures. This will help prevent bleeding. Please follow these instructions until you come back to clinic for suture removal on day 7, as they will help you to prevent complications as your wound heals.  For the First 48 hours After Surgery:  Leave the pressure bandage on and keep it dry. If it should come loose, you may retape it, but do not take it off.  Relax and take it easy. Do not do any vigorous exercise, heavy lifting, or bending forward. This could cause the wound to bleed.  Post-operative pain is usually mild. You may alternate between 1000 mg of Tylenol (acetaminophen) and 400 mg of Ibuprofen every 4 hours.  Do not take more than 4,000mg of acetaminophen in a 24 hour period or 3200 mg of Ibuprofen in a 24 hr period.  Avoid alcohol and vitamin E as these may increase your tendency to bleed.  You may put an ice pack around the bandaged area for 20 minutes every 2-3 hours. This may help reduce swelling, bruising, and pain. Make sure the ice pack is waterproof so that the pressure bandage does not get wet.   You may see a small amount of drainage or blood on your pressure bandage. This is normal. However, if drainage  or bleeding continues or saturates the bandage, you will need to apply firm pressure over the bandage with a washcloth for 15 minutes. If bleeding continues after applying pressure for 15 minutes then go to the nearest emergency room.  48 Hours After Surgery  Carefully remove the bandage and start daily wound care and dressing changes. You may also now shower and get the wound wet.  Daily Wound Care:  Wash wound with a mild soap and water.  Use caution when washing the wound, be gentle and do not let the forceful shower stream hit the wound directly.  Pat dry.  Apply Vaseline (from a new container or tube) over the suture line with a Q-tip. It is very important to keep the wound continuously moist, as wounds heal best in a moist environment.  If you had stitches placed keep the site covered until sutures have either been removed or dissolve.  You can cover it with a Telfa (non-stick) dressing and tape or a band-aid.    If you are unable to keep wound covered, you must apply Vaseline every 2-3 hours (while awake) to ensure it is being kept moist for optimal healing. A dressing overnight is recommended to keep the area moist.  Call Us If:  You have pain that is not controlled with Tylenol/Ibuprofen  You have signs or symptoms of an infection, such as: fever over 100 degrees F, redness, warmth, or foul-smelling or yellow drainage from the wound.  Who should I call with questions?  [facility]: [phone]   Health system: [phone]  For urgent needs outside of business hours call the [facility] at [phone] and ask to speak with the dermatology resident on call

## 2022-08-17 LAB
PATH REPORT.COMMENTS IMP SPEC: NORMAL
PATH REPORT.COMMENTS IMP SPEC: NORMAL
PATH REPORT.FINAL DX SPEC: NORMAL
PATH REPORT.GROSS SPEC: NORMAL
PATH REPORT.MICROSCOPIC SPEC OTHER STN: NORMAL
PATH REPORT.RELEVANT HX SPEC: NORMAL

## 2022-08-18 ENCOUNTER — TELEPHONE (OUTPATIENT)
Dept: DERMATOLOGY | Facility: CLINIC | Age: 82
End: 2022-08-18

## 2022-08-18 NOTE — TELEPHONE ENCOUNTER
Left message for pt to call dermatology nurse at [phone] on Thursday 8/18 or call [phone] on Friday 8/19.  Advised there is no vm at this number, if no answer to call back at a later time.     Advised a my chart message was sent also.    Carla FAUST RN  [facility]  [phone]

## 2022-08-18 NOTE — TELEPHONE ENCOUNTER
----- Message from Jimmy Ball MD sent at 8/18/2022  7:17 AM CDT -----  Pt informed via [employer]. No residual cancer which is expected since it was a superficial multifocal BCC. No further action required

## 2022-08-18 NOTE — TELEPHONE ENCOUNTER
Pt called back and was given Dr. Ball's message below.    Pt states understanding.    Carla FAUST RN  [facility]  [phone]

## 2022-08-22 ENCOUNTER — ALLIED HEALTH/NURSE VISIT (OUTPATIENT)
Dept: DERMATOLOGY | Facility: CLINIC | Age: 82
End: 2022-08-22
Payer: MEDICARE

## 2022-08-22 DIAGNOSIS — Z48.02 VISIT FOR SUTURE REMOVAL: Primary | ICD-10-CM

## 2022-08-22 PROCEDURE — 99207 PR NO CHARGE NURSE ONLY: CPT | Performed by: STUDENT IN AN ORGANIZED HEALTH CARE EDUCATION/TRAINING PROGRAM

## 2022-08-22 NOTE — PROGRESS NOTES
Aurora Figueroa comes into clinic today at the request of Dr. Ball Ordering Provider for Wound Check Action taken: Sutures had mostly disolved, dressing changed. Wound healing well..      This service provided today was under the supervising provider of the day Dr. Ball, who was available if needed.    Velma VASQUEZ RN  Dermatology   [phone]

## 2022-10-23 ENCOUNTER — HEALTH MAINTENANCE LETTER (OUTPATIENT)
Age: 82
End: 2022-10-23

## 2023-02-02 ENCOUNTER — OFFICE VISIT (OUTPATIENT)
Dept: DERMATOLOGY | Facility: CLINIC | Age: 83
End: 2023-02-02
Payer: MEDICARE

## 2023-02-02 DIAGNOSIS — L57.0 ACTINIC KERATOSIS: Primary | ICD-10-CM

## 2023-02-02 PROCEDURE — 96567 PDT DSTR PRMLG LES SKN: CPT | Performed by: PHYSICIAN ASSISTANT

## 2023-02-02 ASSESSMENT — PAIN SCALES - GENERAL: PAINLEVEL: NO PAIN (0)

## 2023-02-02 NOTE — LETTER
"    2/2/2023         RE: Aurora Figueroa  [address] [place] [id number]        Dear Colleague,    Thank you for referring your patient, Aurora Figueroa, to the [facility]. Please see a copy of my visit note below.    HPI:   Chief complaints: Aurora Figueroa is a pleasant 83 year old female who presents for treatment of actinic keratoses with PDT      PHYSICAL EXAM:    LMP  (LMP Unknown)   Skin exam performed as follows: Type 2 skin. Mood appropriate  Alert and Oriented X 3. Well developed, well nourished in no distress.  General appearance: Normal  Head including face: Normal  Eyes: conjunctiva and lids: Normal  Mouth: Lips, teeth, gums: Normal  Neck: Normal  Cardiovascular: Exam of peripheral vascular system by observation for swelling, varicosities, edema: Normal  Right upper extremity: Normal  Left upper extremity: Normal  Right lower extremity: Normal  Left lower extremity: Normal  Skin: Scalp and body hair: See below    Pink gritty papules on the face > 15 in number    ASSESSMENT/PLAN:     PDT: PGACAC discussed. Risks including but not limited to redness, swelling, and blistering to treated area. Patient was instructed to cleanse face thoroughly with a mild cleanser, then face was degreased with acetone. ALA was then applied to face in a uniform manner. Patient sat for 90 minutes after ALA was applied. The blue light shined on patient's face for 16 minutes 40 seconds he/she was approximately 2-4\" away from the light. Patient then was instructed to wash face and sunscreen with spf 50 was applied. Instructed patient to avoid sun light for 48 hours and apply sunscreen daily.             Follow-up: 4-6 months  CC:   Scribed By: Marely Aaron, MS, PACOLLEEN          Again, thank you for allowing me to participate in the care of your patient.        Sincerely,        Marely Aaron PA-C    "

## 2023-02-02 NOTE — PROGRESS NOTES
"HPI:   Chief complaints: Aurora Figueroa is a pleasant 83 year old female who presents for treatment of actinic keratoses with PDT      PHYSICAL EXAM:    LMP  (LMP Unknown)   Skin exam performed as follows: Type 2 skin. Mood appropriate  Alert and Oriented X 3. Well developed, well nourished in no distress.  General appearance: Normal  Head including face: Normal  Eyes: conjunctiva and lids: Normal  Mouth: Lips, teeth, gums: Normal  Neck: Normal  Cardiovascular: Exam of peripheral vascular system by observation for swelling, varicosities, edema: Normal  Right upper extremity: Normal  Left upper extremity: Normal  Right lower extremity: Normal  Left lower extremity: Normal  Skin: Scalp and body hair: See below    Pink gritty papules on the face > 15 in number    ASSESSMENT/PLAN:     PDT: PGACAC discussed. Risks including but not limited to redness, swelling, and blistering to treated area. Patient was instructed to cleanse face thoroughly with a mild cleanser, then face was degreased with acetone. ALA was then applied to face in a uniform manner. Patient sat for 90 minutes after ALA was applied. The blue light shined on patient's face for 16 minutes 40 seconds he/she was approximately 2-4\" away from the light. Patient then was instructed to wash face and sunscreen with spf 50 was applied. Instructed patient to avoid sun light for 48 hours and apply sunscreen daily.             Follow-up: 4-6 months  CC:   Scribed By: Marely Aaron, MS, PA-C      "

## 2023-02-02 NOTE — PATIENT INSTRUCTIONS
Possible side effects  Photosensitivity reactions: Reactions caused by light can show up on the skin where the drug is applied. They usually involve redness and a tingling or burning sensation. For about 2 days after the drug is used, you should take care to not expose treated areas of your face and scalp to light.  Stay out of strong, direct light.   Stay indoors as much as possible.   Wear protective clothing and wide-brimmed hats to avoid sunlight when outdoors.   Avoid beaches, snow, light colored concrete, or other surfaces where strong light may be reflected.  Sunscreens will not protect the skin from photosensitivity reactions.  Skin changes: The treated skin will likely turn red and may swell after treatment. Maybe mild or severe. Can use solaracine, aquaphor or OTC hydrocortisone to help with the irritation after treatment. Can use cool compresses if uncomfortable. If needed you can take tylenol or ibuprofen to help with your discomfort, as long as these medications are considered safe for you. This usually peaks about a day after treatment and gets better within a week. It should be gone about 4 weeks after treatment. The skin may also be itchy or change color after treatment.  Recommended General Skin care:   Eliminate harsh soaps, i.e. Dial, Zest, Barbara spring.  Use mild soaps such as Cetaphil or Dove sensitive skin.  Avoid overly hot or cold showers.  Use Vanicream, Cetaphil, Eucerin or Cerave creams to moisturize your skin.  Scoop-able creams are better than thin lotions.  Apply moisturizer immediately to damp skin after patting skin dry with towel.   [employer] Public inFormation Center:   Informative resources for the public to learn more about   skin conditions, tips on performing self-examinations, sun   safety, and more The public can find information online at   www aad org or by calling [phone] ([phone])

## 2023-02-02 NOTE — NURSING NOTE
Aurora Figueroa's chief complaint for this visit includes:  Chief Complaint   Patient presents with     Derm Problem     PDT- face     PCP: Darlene Nicole    Referring Provider:  No referring provider defined for this encounter.    LMP  (LMP Unknown)   No Pain (0)        Allergies   Allergen Reactions     Food Itching     Raw fruit & veg:  Apples, cherries, cantalope, tomatoes,  carrotts.  Eye & throat irritations.     Penicillins Hives     Fruit [Cherry]      Hives, itchy throat tomatoes     Nsaids Other (See Comments)     Patient had gastric bypass surgery.  Should not take oral  NSAID's ever.     Animal Dander      Pruritis,itchi eyes, throat and ears.     E.E.S. GI Disturbance     Erythromycin GI Disturbance     Pollen Extract Itching         Do you need any medication refills at today's visit? NO    Eloina Truong MA

## 2023-02-16 NOTE — NURSING NOTE
MEDICINE HISTORY AND PHYSICAL    Patient ID:  Sallie Tolentino  [id number]  80 year old  1942     Primary Care Physician:   Melba Valadez MD    Chief Complaint:   Chief Complaint   Patient presents with   • Shortness of Breath       History of Present Illness:     80-year-old female with a past medical history of COPD, hyperlipidemia, hypoxic respiratory failure chronic on 3 L of oxygen, memory loss, osteoporosis, history of breast cancer status post lumpectomy, polycythemia vera, right lung cancer status post lobectomy 2016 presents to the hospital with cough, shortness of breath and chest pain.  Cough productive of greenish sputum.  Denies any fever or chills.  Patient been complaining left-sided chest pain, sharp knife-like associated with deep breathing.  Patient had tried taking ibuprofen but presented to the hospital today secondary to with it worsening.  In the ER when she presented the patient was found to be hypoxic saturating 87% on 15 L of oxygen with increased work with bleeding.  Patient was placed on BiPAP.  Denies any fever or chills.  No nausea vomiting or diarrhea.      Patient had a esophagogram and upper GI series yesterday that showed severe esophageal dysmotility but no aspiration was seen.  Recent  video swallow on 01/24 however had shown mild transient laryngeal penetration with every consistency of barium with no evidence of aspiration.    Vital signs 97.2 pulse 105 respiratory rate 36 blood pressure 89/48, 5 L of oxygen.  Lab work reveals a CMP significant for glucose of 108.  ABG 7.35 with pCO2 of 62 and PO2 of 90.  Lactic acid normal.  Procalcitonin 0.27.  White count 25.  Blood cultures have been sent.  COVID influenza and RSV negative.  Chest x-ray small right-sided pleural effusion with airspace disease in the right lower lobe stable from previous exam and no pneumothorax and prominent heart size and central vasculature.  CT PE tiny subsegmental emboli in the left lower  Patient was given biopsy results in office. (see other encounter).    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    Review of Systems:   Constitutional: There is no history of fevers or chills  Eyes: Patient denies blurred vision or photophobia  ENMT: No history of ear pain, tinnitus or epistaxis  Cardiovascular: Denies history of chest pain or palpitations  Respiratory: There is no history of shortness of breath, orthopnea or paroxysmal nocturnal dyspnea  GI: No history of abdominal pain, nausea or vomiting  Genitourinary: There is no history of dysuria or hematuria  Musculoskeletal: Denies history of new joint pain or swelling  Neurologic: No history of numbness, tingling or weakness  Skin: No history of skin rashes         Past Medical History:     COPD (chronic obstructive pulmonary disease) (*               Hemorrhoids                                                   Hyperlipidemia                                                Hypoxia                                                       Memory loss                                                   OP (osteoporosis)                                             Allergic rhinitis                                             Asthma                                                        S/P lumpectomy, left breast                     05/16/2016    Polycythemia vera                                             Invasive ductal carcinoma of breast (CMS/HCC)   05/01/2016      Comment: Moderately differentiated invasive ductal                carcinoma, Left     Malignant neoplasm of lower lobe of right lung* 06/23/2016      Comment: squamous cell carcinoma    Wears glasses                                                 Hypoglycemia                                                    Comment: At times when not eating well    Past Surgical History:    REMOVAL OF TONSILS,14+ Y/O                                    BREAST SURGERY                                   05-      Comment: Lumpectomy with axillary sentinel node biopsy    BREAST SURGERY                                                  Comment: biopsy, benign    BRONCHOSCOPY,DIAGNOSTIC W BRUSH                 6/17/2016       Comment: Done in GI with DR. Mike CEDENO                                                         Comment: Around menopause.  Gave patient a pill    MEDIASTINOSCOPY                                 7/14/2016       Comment: with flexible bronchoscopy    THORACOSCOPY                                    7/27/2016       Comment: bilobectomy - RML, RLL, lymphadenectomy    TOTAL ABDOMINAL HYSTERECTOMY W/ BILATERAL SALP* 10/11/2016      Comment: Xu    COLECTOMY                                       10/11/2016      Comment: LAR per Klas     APPENDECTOMY                                    10/11/2016      Comment: Klas     Family History:   Family History   Problem Relation Age of Onset   • Cancer Mother         pancreatic   • Psoriasis Mother    • Osteoarthritis Mother    • Heart disease Brother    • Cancer Brother         Prostate & leukemia   • Cancer Brother         colon ,kidney   • Heart disease Son         LVH   • Obesity Son    • Cancer, Lung Niece        Social History:   Reviewed in EPIC    Medications:   Medications Prior to Admission   Medication Sig Dispense Refill   • ipratropium-albuterol (DUONEB) 0.5-2.5 (3) MG/3ML nebulizer solution USE 1 VIAL VIA NEBULIZER EVERY 4 TO 6 HOURS AS NEEDED FOR CHRONIC OBSTRUCTIVE LUNG DISEASE OR WHEEZING 360 mL 3   • albuterol 108 (90 Base) MCG/ACT inhaler INHALE 2 PUFFS INTO THE LUNGS EVERY 4 HOURS AS NEEDED FOR SHORTNESS OF BREATH OR WHEEZING 18 g 5   • predniSONE (DELTASONE) 10 MG tablet Take 4 tablets by mouth daily for 5 days, THEN 3 tablets daily for 5 days, THEN 2 tablets daily for 5 days, THEN 1 tablet daily for 5 days. 50 tablet 0   • fluticasone-umeclidin-vilanterol (TRELEGY ELLIPTA) 100-62.5-25 MCG/ACT inhaler Inhale 1 puff  into the lungs daily. 60 each 0   • ALPRAZolam (XANAX) 0.5 MG tablet Take 0.5 mg by mouth 3 times daily as needed for Sleep or Anxiety.     • ALPRAZolam (XANAX) 0.5 MG tablet Take 0.5 mg by mouth 3 times daily as needed. for Sleep or anxiety     • Lactobacillus (PROBIOTIC ACIDOPHILUS PO) Take 1,000 mg by mouth daily.     • furosemide (LASIX) 40 MG tablet Take 1 tablet by mouth daily. Do not start before December 31, 2022. 30 tablet 0   • potassium chloride (K-TABS) 10 MEQ ER tablet Take 1 tablet by mouth daily. 30 tablet 0   • Misc Natural Products (NEURIVA PO)      • oxygen (O2) gas Inhale 3 L/min into the lungs continuous. Indications: shortness of breath     • pantoprazole (Protonix) 40 MG tablet Take 1 tablet by mouth daily. 30 tablet 0     @edmed@  Allergies:   Allergies as of 02/16/2023 - Reviewed 02/16/2023   Allergen Reaction Noted   • Codeine Nausea & Vomiting 10/27/2014   • Latex Other (See Comments) 02/16/2023   • Pneumococcal vaccines Other (See Comments) 08/13/2020   • Adhesive   (environmental) ERYTHEMA 10/11/2016               Physical Exam:    Vital Signs:    Visit Vitals  /55   Pulse 82   Temp 97.2 °F (36.2 °C)   Resp (!) 21   SpO2 93%     GENERAL: This is a 80 year old female in no acute distress  PSYCH: She is awake alert and oriented to time, place and person. Mood and affect are appropiate  HEAD: Appears to be atraumatic and normocephalic   EYES: Reveal no icterus, no scleral injection, no conjunctival pallor. Pupils   equal, round and reactive to light and accommodation.  Extraocular movements appear to be intact  THROAT: Oropharyngeal exam reveals moist oral mucosa. There is no erythema, discharge or thrush. Dentition is good  NECK: Supple and symmetric. There is no JVD or thyromegaly     LUNGS: Reveals good effort and lungs are clear to auscultation   bilaterally. There are no wheezes or rhonchi  HEART: Reveals presence of first and second heart sounds. Regular rate and rhythm. No  murmurs, rubs or gallops  ABDOMEN:  Normoactive bowel sounds. Soft and nontender. There is no rebound tenderness.  There is no hepatosplenomegaly   EXTREMITIES: No clubbing, cyanosis or edema  NEUROLOGIC: Cranial nerves II through XII appear grossly intact. Sensation is intact  SKIN: Appears unremarkable and no rashes are present       Labs:  Lab Results   Component Value Date    SODIUM 137 02/16/2023    POTASSIUM 4.4 02/16/2023    CHLORIDE 98 02/16/2023    CO2 34 (H) 02/16/2023    GLUCOSE 108 (H) 02/16/2023    BUN 13 02/16/2023    CREATININE 0.54 02/16/2023    ALBUMIN 3.1 (L) 02/16/2023    BILIRUBIN 0.8 02/16/2023    LACTA 2.6 (HH) 12/25/2022    AST 14 02/16/2023    PHOS 2.4 12/27/2022    INR 0.9 12/08/2022     Lab Results   Component Value Date    PTT 25 12/08/2022    WBC 25.0 (H) 02/16/2023    HGB 13.2 02/16/2023    HCT 42.2 02/16/2023     02/16/2023     (H) 11/06/2017    MYOGLOBIN 257 (H) 10/12/2016    TSH 1.845 12/13/2018       INR (no units)   Date Value   12/08/2022 0.9       Diagnostics:   XR Chest AP or PA   Final Result   IMPRESSION: As above.      CT Chest PE Imaging   Final Result   IMPRESSION:   Possible tiny subsegmental emboli in the left lower lobe. This may be even   subacute. It was not present on 12/24/2022.      Otherwise stable chronic clot in a tight off the descending right pulmonary   artery stump.                         ASSESSMENT AND PLAN:    Acute on chronic hypoxic respiratory failure on 3 L of oxygen at baseline  Nosocomial pneumonia  Severe sepsis  Acute/subacute PE  Advanced emphysema/COPD with exacerbation  Elevated procalcitonin  Leukocytosis  History of right-sided lung cancer status post lobectomy in 2016, declined chemotherapy  History of breast cancer status post lumpectomy 2016  Essential hypertension  Hyperlipidemia  Atypical chest pain:    Admit to ICU  under telemetry  Patient presently on BiPAP.  Continue supplemental oxygen p.r.n.  Pulmonary  consulted  Elevated procalcitonin.  Normal lactic acid   CT chest negative for any acute consolidation.  In fact shows partial clearing of consolidative changes in the left lower lobe.  UA negative for any UTI  IV vancomycin Flagyl and cefepime.  Blood cultures x2 sent.  Urine Legionella and Streptococcus pneumoniae antigen.  Sputum Gram stain culture sensitivity.  Flu, RSV and influenza negative.  MRSA nares  DuoNebs q.4 hours and p.r.n.  Solu-Medrol 40 mg IV bid  Continue other home inhalers  Lovenox 1 milligram/kg subQ b.i.d.  Lower extremity venous doppler pending    Troponin negative.  Monitor serial troponin.  Continue telemetry monitor for arrhythmia  Echocardiogram 12/26/2022 showed normal LV size systolic function with an EF of 55%, mildly elevated RV systolic pressures of 38, mild tricuspid valve regurgitation.  Continue home dose of Lasix    Continue Protonix daily.            GI/DVT Prophylaxis:  Protonix, YAKELIN, SCD, Lovenox    Code status: Prior        Is the patient expected to require at least a two midnight stay in the hospital? Yes -  I certify that I expect inpatient services for greater than two midnights are medically necessary for this patient.  Please see H&P and MD Progress Notes for additional information about the patient's course of treatment.    Shayla Montana MD  2/16/2023

## 2023-02-17 ENCOUNTER — TELEPHONE (OUTPATIENT)
Dept: DERMATOLOGY | Facility: CLINIC | Age: 83
End: 2023-02-17
Payer: MEDICARE

## 2023-02-17 NOTE — TELEPHONE ENCOUNTER
[facility] Call Center    Phone Message    May a detailed message be left on voicemail: yes     Reason for Call: Other: Pt needs to reschedule 2/24 appt because it is too soon per Agnieszka. Soonest avail is Aug. Too far. Please call [phone]. Thanks!     Action Taken: Message routed to:  [facility] ([facility]): DERM    Travel Screening: Not Applicable

## 2023-02-20 DIAGNOSIS — E03.4 HYPOTHYROIDISM DUE TO ACQUIRED ATROPHY OF THYROID: ICD-10-CM

## 2023-02-20 NOTE — TELEPHONE ENCOUNTER
Medication Question or Refill        What medication are you calling about (include dose and sig)?: Levothyroxine 50 mcg    Preferred Pharmacy:   [id number] - [place], [place] - [address] [place] [facility] [address]  [facility] [address]  Phone: [phone] Fax: [phone]      Controlled Substance Agreement on file:   CSA -- Patient Level:    CSA: None found at the patient level.       Who prescribed the medication?: Nadine Allen    Do you need a refill? Yes    When did you use the medication last? 02/20/2023    Patient offered an appointment? Yes: Scheduled an appointment in april    Do you have any questions or concerns?  No      Could we send this information to you in [place] or would you prefer to receive a phone call?:   Patient would prefer a phone call   Okay to leave a detailed message?: Yes at Cell number on file:    Telephone Information:   Mobile [phone]

## 2023-02-21 RX ORDER — LEVOTHYROXINE SODIUM 50 UG/1
TABLET ORAL
Qty: 90 TABLET | Refills: 0 | Status: SHIPPED | OUTPATIENT
Start: 2023-02-21 | End: 2023-04-06

## 2023-02-21 NOTE — TELEPHONE ENCOUNTER
Appointments in Next Year    Apr 06, 2023  3:00 PM  (Arrive by 2:40 PM)  Provider Visit with Jasbir Garcia MD  [facility] ([facility] ) [phone]

## 2023-03-01 ENCOUNTER — ANCILLARY PROCEDURE (OUTPATIENT)
Dept: MAMMOGRAPHY | Facility: CLINIC | Age: 83
End: 2023-03-01
Attending: OBSTETRICS & GYNECOLOGY
Payer: MEDICARE

## 2023-03-01 DIAGNOSIS — Z12.31 VISIT FOR SCREENING MAMMOGRAM: ICD-10-CM

## 2023-03-01 PROCEDURE — 77063 BREAST TOMOSYNTHESIS BI: CPT | Mod: TC | Performed by: RADIOLOGY

## 2023-03-01 PROCEDURE — 77067 SCR MAMMO BI INCL CAD: CPT | Mod: TC | Performed by: RADIOLOGY

## 2023-03-08 DIAGNOSIS — I10 BENIGN ESSENTIAL HYPERTENSION: ICD-10-CM

## 2023-03-08 DIAGNOSIS — E78.00 HYPERCHOLESTEROLEMIA: ICD-10-CM

## 2023-03-08 RX ORDER — METOPROLOL SUCCINATE 25 MG/1
25 TABLET, EXTENDED RELEASE ORAL DAILY
Qty: 90 TABLET | Refills: 0 | Status: SHIPPED | OUTPATIENT
Start: 2023-03-08 | End: 2023-04-06

## 2023-03-08 RX ORDER — SIMVASTATIN 40 MG
40 TABLET ORAL DAILY
Qty: 90 TABLET | Refills: 0 | Status: SHIPPED | OUTPATIENT
Start: 2023-03-08 | End: 2023-04-06

## 2023-04-02 ENCOUNTER — HEALTH MAINTENANCE LETTER (OUTPATIENT)
Age: 83
End: 2023-04-02

## 2023-04-06 ENCOUNTER — OFFICE VISIT (OUTPATIENT)
Dept: INTERNAL MEDICINE | Facility: CLINIC | Age: 83
End: 2023-04-06
Payer: MEDICARE

## 2023-04-06 VITALS
HEIGHT: 65 IN | HEART RATE: 61 BPM | SYSTOLIC BLOOD PRESSURE: 118 MMHG | TEMPERATURE: 97.2 F | WEIGHT: 188.1 LBS | BODY MASS INDEX: 31.34 KG/M2 | DIASTOLIC BLOOD PRESSURE: 74 MMHG | OXYGEN SATURATION: 98 % | RESPIRATION RATE: 15 BRPM

## 2023-04-06 DIAGNOSIS — R14.0 ABDOMINAL BLOATING: ICD-10-CM

## 2023-04-06 DIAGNOSIS — I10 BENIGN ESSENTIAL HYPERTENSION: ICD-10-CM

## 2023-04-06 DIAGNOSIS — H61.23 BILATERAL IMPACTED CERUMEN: ICD-10-CM

## 2023-04-06 DIAGNOSIS — G89.29 CHRONIC PAIN OF BOTH SHOULDERS: ICD-10-CM

## 2023-04-06 DIAGNOSIS — M25.512 CHRONIC PAIN OF BOTH SHOULDERS: ICD-10-CM

## 2023-04-06 DIAGNOSIS — M25.511 CHRONIC PAIN OF BOTH SHOULDERS: ICD-10-CM

## 2023-04-06 DIAGNOSIS — R73.9 HYPERGLYCEMIA: ICD-10-CM

## 2023-04-06 DIAGNOSIS — E03.4 HYPOTHYROIDISM DUE TO ACQUIRED ATROPHY OF THYROID: ICD-10-CM

## 2023-04-06 DIAGNOSIS — E78.00 HYPERCHOLESTEROLEMIA: Primary | ICD-10-CM

## 2023-04-06 LAB
CHOLEST SERPL-MCNC: 178 MG/DL
HBA1C MFR BLD: 6 % (ref 0–5.6)
HDLC SERPL-MCNC: 65 MG/DL
LDLC SERPL CALC-MCNC: 96 MG/DL
NONHDLC SERPL-MCNC: 113 MG/DL
TRIGL SERPL-MCNC: 84 MG/DL
TSH SERPL DL<=0.005 MIU/L-ACNC: 3.67 UIU/ML (ref 0.3–4.2)

## 2023-04-06 PROCEDURE — 80061 LIPID PANEL: CPT | Performed by: INTERNAL MEDICINE

## 2023-04-06 PROCEDURE — 99214 OFFICE O/P EST MOD 30 MIN: CPT | Mod: 25 | Performed by: INTERNAL MEDICINE

## 2023-04-06 PROCEDURE — 69209 REMOVE IMPACTED EAR WAX UNI: CPT | Performed by: INTERNAL MEDICINE

## 2023-04-06 PROCEDURE — 84443 ASSAY THYROID STIM HORMONE: CPT | Performed by: INTERNAL MEDICINE

## 2023-04-06 PROCEDURE — 83036 HEMOGLOBIN GLYCOSYLATED A1C: CPT | Performed by: INTERNAL MEDICINE

## 2023-04-06 PROCEDURE — 36415 COLL VENOUS BLD VENIPUNCTURE: CPT | Performed by: INTERNAL MEDICINE

## 2023-04-06 RX ORDER — LEVOTHYROXINE SODIUM 50 UG/1
TABLET ORAL
Qty: 90 TABLET | Refills: 3 | Status: SHIPPED | OUTPATIENT
Start: 2023-04-06 | End: 2024-02-29

## 2023-04-06 RX ORDER — SIMVASTATIN 40 MG
40 TABLET ORAL DAILY
Qty: 90 TABLET | Refills: 3 | Status: SHIPPED | OUTPATIENT
Start: 2023-04-06 | End: 2023-06-20

## 2023-04-06 RX ORDER — METOPROLOL SUCCINATE 25 MG/1
25 TABLET, EXTENDED RELEASE ORAL DAILY
Qty: 90 TABLET | Refills: 3 | Status: SHIPPED | OUTPATIENT
Start: 2023-04-06 | End: 2024-05-13

## 2023-04-06 NOTE — NURSING NOTE
Patient identified using two patient identifiers. Ear exam showing wax occlusion completed by Dr. Garcia. {Solution: warm water was placed in both ear(s) via elephant ear wash.

## 2023-04-06 NOTE — PROGRESS NOTES
"  Assessment & Plan     Hypercholesterolemia  Labs ordered as fasting per routine  - Lipid panel reflex to direct LDL Non-fasting  - simvastatin (ZOCOR) 40 MG tablet  Dispense: 90 tablet; Refill: 3    Hypothyroidism due to acquired atrophy of thyroid  Recheck thyroid function test  - TSH WITH FREE T4 REFLEX  - levothyroxine (SYNTHROID/LEVOTHROID) 50 MCG tablet  Dispense: 90 tablet; Refill: 3    Benign essential hypertension  Stable on therapy continue with current medical management  - metoprolol succinate ER (TOPROL XL) 25 MG 24 hr tablet  Dispense: 90 tablet; Refill: 3    Bilateral impacted cerumen  Will irrigate ears bilaterally  - REMOVE IMPACTED CERUMEN    Chronic pain of both shoulders  Chronic issues with orthopedic pain.  Referral placed  - Orthopedic  Referral    Abdominal bloating  Nonspecific bloating without focal change.  Consider ultrasound for further assessment  - US Abdomen Complete    Hyperglycemia  Patient requests A1c check  - Hemoglobin A1c       Patient was advised that she should follow-up with urology as well as podiatry in regards to issues of her pessary and toenail concerns.    BMI:   Estimated body mass index is 31.3 kg/m  as calculated from the following:    Height as of this encounter: 1.651 m (5' 5\").    Weight as of this encounter: 85.3 kg (188 lb 1.6 oz).   Weight management plan: Discussed healthy diet and exercise guidelines    See Patient Instructions    Jasbir Garcia MD  [facility]   Aurora is a 83 year old, presenting for the following health issues:  Establish Care    History of Present Illness       Hypertension: She presents for follow up of hypertension.  She does not check blood pressure  regularly outside of the clinic. Outpatient blood pressures have not been over 140/90. She does not follow a low salt diet.     Hypothyroidism:     Since last visit, patient describes the following symptoms::  None    Weight " gain::  No weight gain    Weight loss::  No weight loss    Reason for visit:  Several health issues    She eats 0-1 servings of fruits and vegetables daily.She consumes 1 sweetened beverage(s) daily.She exercises with enough effort to increase her heart rate 9 or less minutes per day.  She exercises with enough effort to increase her heart rate 3 or less days per week.   She is taking medications regularly.       Other concerns:  1. Bump on upper eyelid - discussed.  2. Left ear needs to cleaned prior to audiology testing -will irrigate  3. Left shoulder pain, intermittent but chronic in nature.  4. Patient has been unable to use pessary due to lube being on back order - reviewed benefit of seeing gynecology  5. Ingrown left foot great toe - reviewed benefit of seeing podiatry  6. Left hand pain, concerns about carpal tunnel/ arthritis - referral  7. Abdominal bloating, occasional pain in upper abdomen - discussed  8. Right elbow pain, began last night       Review of Systems   CONSTITUTIONAL: NEGATIVE for fever, chills, change in weight  ENT/MOUTH: NEGATIVE mouth and throat problems  RESP: NEGATIVE for significant cough or SOB  CV: NEGATIVE for chest pain, palpitations or peripheral edema  GI: NEGATIVE for heartburn, or change in bowel habits  : NEGATIVE for frequency, dysuria, or hematuria  MUSCULOSKELETAL: NEGATIVE for significant arthralgias or myalgia  NEURO: NEGATIVE for weakness, dizziness or paresthesias  HEME: NEGATIVE for bleeding problems  PSYCHIATRIC: NEGATIVE for changes in mood or affect    Current Outpatient Medications   Medication     Cholecalciferol (VITAMIN D) 1000 UNITS capsule     Cyanocobalamin (VITAMIN B 12 PO)     ferrous sulfate (FEROSUL) 325 (65 Fe) MG tablet     levothyroxine (SYNTHROID/LEVOTHROID) 50 MCG tablet     methylcellulose (CITRUCEL) powder     metoprolol succinate ER (TOPROL XL) 25 MG 24 hr tablet     Multiple Vitamins-Minerals (ICAPS AREDS 2 PO)     OMEPRAZOLE PO     Pediatric  "Multivit-Minerals-C (FLINTSTONES COMPLETE PO)     simvastatin (ZOCOR) 40 MG tablet     No current facility-administered medications for this visit.             Objective    /74   Pulse 61   Temp 97.2  F (36.2  C) (Temporal)   Resp 15   Ht 1.651 m (5' 5\")   Wt 85.3 kg (188 lb 1.6 oz)   LMP  (LMP Unknown)   SpO2 98%   BMI 31.30 kg/m    Body mass index is 31.3 kg/m .  Physical Exam   GENERAL: healthy, alert and no distress  EYES: Eyes grossly normal to inspection, PERRL and conjunctivae and sclerae normal  HENT: ear canals and TM's normal, nose and mouth without ulcers or lesions  NECK: no adenopathy, no asymmetry, masses, or scars and thyroid normal to palpation  RESP: lungs clear to auscultation - no rales, rhonchi or wheezes  CV: regular rate and rhythm, normal S1 S2, no S3 or S4, no murmur, click or rub,  ABDOMEN: soft, nontender, no hepatosplenomegaly, no masses and bowel sounds normal  NEURO: No focal changes  There is mild reduced range of motion left shoulder.  There is no focal tenderness noted to the right elbow at site of complaint.  PSYCH: mentation appears normal, affect normal/bright                  "

## 2023-04-06 NOTE — LETTER
April 11, 2023      Aurora Figueroa  [address] [facility] [address]        Dear ,    Your cholesterol looks good and I would not change anything at this point but would repeat your labs in 12 months.     Your thyroid function tests also look good and thus I would not change anything at this point.     Your Hemoglobin A1C looks sable and I would continue with your medication and diet without change.  These tests should be repeated in 6 months.     Dr. Garcia  Resulted Orders   Lipid panel reflex to direct LDL Non-fasting   Result Value Ref Range    Cholesterol 178 <200 mg/dL    Triglycerides 84 <150 mg/dL    Direct Measure HDL 65 >=50 mg/dL    LDL Cholesterol Calculated 96 <=100 mg/dL    Non HDL Cholesterol 113 <130 mg/dL    Narrative    Cholesterol  Desirable:  <200 mg/dL    Triglycerides  Normal:  Less than 150 mg/dL  Borderline High:  150-199 mg/dL  High:  200-499 mg/dL  Very High:  Greater than or equal to 500 mg/dL    Direct Measure HDL  Female:  Greater than or equal to 50 mg/dL   Male:  Greater than or equal to 40 mg/dL    LDL Cholesterol  Desirable:  <100mg/dL  Above Desirable:  100-129 mg/dL   Borderline High:  130-159 mg/dL   High:  160-189 mg/dL   Very High:  >= 190 mg/dL    Non HDL Cholesterol  Desirable:  130 mg/dL  Above Desirable:  130-159 mg/dL  Borderline High:  160-189 mg/dL  High:  190-219 mg/dL  Very High:  Greater than or equal to 220 mg/dL   TSH WITH FREE T4 REFLEX   Result Value Ref Range    TSH 3.67 0.30 - 4.20 uIU/mL   Hemoglobin A1c   Result Value Ref Range    Hemoglobin A1C 6.0 (H) 0.0 - 5.6 %      Comment:      Normal <5.7%   Prediabetes 5.7-6.4%    Diabetes 6.5% or higher     Note: Adopted from ADA consensus guidelines.       If you have any questions or concerns, please call the clinic at the number listed above.       Sincerely,      Jasbir Garcia MD

## 2023-04-19 ENCOUNTER — OFFICE VISIT (OUTPATIENT)
Dept: ORTHOPEDICS | Facility: CLINIC | Age: 83
End: 2023-04-19
Attending: INTERNAL MEDICINE
Payer: MEDICARE

## 2023-04-19 ENCOUNTER — ANCILLARY PROCEDURE (OUTPATIENT)
Dept: GENERAL RADIOLOGY | Facility: CLINIC | Age: 83
End: 2023-04-19
Attending: STUDENT IN AN ORGANIZED HEALTH CARE EDUCATION/TRAINING PROGRAM
Payer: MEDICARE

## 2023-04-19 VITALS — WEIGHT: 188 LBS | BODY MASS INDEX: 31.32 KG/M2 | HEIGHT: 65 IN

## 2023-04-19 DIAGNOSIS — G89.29 CHRONIC PAIN OF BOTH SHOULDERS: ICD-10-CM

## 2023-04-19 DIAGNOSIS — M67.819 TENDINOSIS OF ROTATOR CUFF: ICD-10-CM

## 2023-04-19 DIAGNOSIS — M19.012 ARTHRITIS OF BOTH ACROMIOCLAVICULAR JOINTS: ICD-10-CM

## 2023-04-19 DIAGNOSIS — M75.41 ROTATOR CUFF IMPINGEMENT SYNDROME OF RIGHT SHOULDER: ICD-10-CM

## 2023-04-19 DIAGNOSIS — M25.512 CHRONIC PAIN OF BOTH SHOULDERS: ICD-10-CM

## 2023-04-19 DIAGNOSIS — G89.29 CHRONIC PAIN OF BOTH SHOULDERS: Primary | ICD-10-CM

## 2023-04-19 DIAGNOSIS — M19.011 ARTHRITIS OF BOTH ACROMIOCLAVICULAR JOINTS: ICD-10-CM

## 2023-04-19 DIAGNOSIS — M25.512 CHRONIC PAIN OF BOTH SHOULDERS: Primary | ICD-10-CM

## 2023-04-19 DIAGNOSIS — M25.511 CHRONIC PAIN OF BOTH SHOULDERS: ICD-10-CM

## 2023-04-19 DIAGNOSIS — M25.511 CHRONIC PAIN OF BOTH SHOULDERS: Primary | ICD-10-CM

## 2023-04-19 PROCEDURE — 99204 OFFICE O/P NEW MOD 45 MIN: CPT | Performed by: STUDENT IN AN ORGANIZED HEALTH CARE EDUCATION/TRAINING PROGRAM

## 2023-04-19 PROCEDURE — 73030 X-RAY EXAM OF SHOULDER: CPT | Mod: TC | Performed by: RADIOLOGY

## 2023-04-19 NOTE — LETTER
4/19/2023         RE: Aurora Figueroa  [address] [place] [id number]        Dear Colleague,    Thank you for referring your patient, Aurora Figueroa, to the [facility] [place]. Please see a copy of my visit note below.    ASSESSMENT & PLAN    1. Chronic pain of both shoulders    2. Osteoarthritis of both acromioclavicular joints    3. Tendinosis of rotator cuff    4. Rotator cuff impingement syndrome of right shoulder      Aurora Figueroa is a 83 year old female presenting for evaluation of chronic bilateral shoulder pain. History, examination and X-ray imaging are consistent with acromioclavicular and glenohumeral joint osteoarthritis, as well as rotator cuff tendinosis with impingement. Treatment options include pain management (topicals, NSAIDS, steroid injection), activity modification (avoiding painful activities), formal physical therapy and advanced imaging or surgical referral.    At this time, plan to proceed with the following:  - Activity as tolerated based on pain. Avoid painful activities and gradually increase as symptoms improve.   - Referral placed for formal physical therapy. Please call [phone] to schedule. Exercises to include pain free range of motion of the shoulder, scapular stabilization, stretching of the shoulder capsule, strengthening of the periscapular, rotator cuff, and deltoid muscles, with progression to functional rehabilitation with home exercise prescription.  - Heat or ice as needed for pain.   - Tylenol 1000 mg up to 3 times per day as needed for pain.  - Ibuprofen or topical Voltaren gel. Topical diclofenac (Voltaren) gel may be applied to the painful area 3-4 times per day for up to 2 weeks, then reduce to as-needed. This is an over-the-counter topical non-steroidal anti-inflammatory (NSAID) medication. Do not use with other NSAIDS like ibuprofen or advil.    Please schedule a follow up appointment after  about 6-8 weeks of physical therapy if symptoms are not improving. You may call our direct clinic number ([phone]) at any time with questions or concerns.    Violet Salgado MD, [facility]    -----    SUBJECTIVE  Aurora Figueroa is a/an 83 year old Right handed female who is seen in consultation at the request of  Jasbir Garcia M.D. for evaluation of bilateral (currently right>left) shoulder pain. The patient is seen by themselves.    Onset: Years. Reports insidious onset without acute precipitating event.  Location of Pain: bilateral superior and lateral shoulders  Rating of Pain at worst: 5/10  Rating of Pain Currently: 1/10  Worsened by: vacuuming, chipping ice, carrying / lifting heavy items, reaching overhead  Better with: rest / activity avoidance  Treatments tried: rest/activity avoidance, sleeping with pillow underneath arm  Associated symptoms: no weakness, instability or mechanical symptoms  Orthopedic history: YES - see below  Relevant surgical history: YES - bilateral TKA, left YUAN  Social history: social history: retired    Past Medical History:   Diagnosis Date     Arrhythmia     tachycardia intermittant (metoprolol)     Basal cell carcinoma      Gastroesophageal reflux disease      Hyperlipidemia      Hypertension      Hypothyroidism      LUMBOSACRAL NEURITIS NOS 4/12/2007     Pemphigus 2015     Sleep apnea     does not wear CPAP (resolved after gastric bypass surgery)     Squamous cell carcinoma      Tachycardia      Social History     Socioeconomic History     Marital status:    Tobacco Use     Smoking status: Never     Smokeless tobacco: Never   Substance and Sexual Activity     Alcohol use: Yes     Alcohol/week: 0.0 standard drinks of alcohol     Comment: rare     Drug use: No   Other Topics Concern     Parent/sibling w/ CABG, MI or angioplasty before 65F 55M? No         Patient's past medical, surgical, social, and family  "histories were reviewed today and no changes are noted.    REVIEW OF SYSTEMS:  10 point ROS is negative other than symptoms noted above in HPI, Past Medical History or as stated below  Constitutional: NEGATIVE for fever, chills, change in weight  Skin: NEGATIVE for worrisome rashes, moles or lesions  GI/: NEGATIVE for bowel or bladder changes  Neuro: NEGATIVE for weakness, dizziness or paresthesias    OBJECTIVE:  Ht 1.651 m (5' 5\")   Wt 85.3 kg (188 lb)   LMP  (LMP Unknown)   BMI 31.28 kg/m     General: healthy, alert and in no distress  HEENT: no scleral icterus or conjunctival erythema  Skin: no suspicious lesions or rash. No jaundice.  CV: regular rhythm by palpation  Resp: normal respiratory effort without conversational dyspnea   Psych: normal mood and affect  Gait: normal steady gait with appropriate coordination and balance  Neuro: normal light touch sensory exam of the bilateral upper extremities.    MSK:  BILATERAL SHOULDER  Inspection:    No swelling, bruising, discoloration, or obvious deformity or asymmetry  Palpation:    Tender about the bilateral AC joint, anterior capsule and greater tuberosity. Remainder of bony and tendinous landmarks are nontender.  Active Range of Motion:     Abduction 1650, FF 1650, , IR L4.    Strength:    Scapular plane abduction 5-/5,  ER 5-/5, IR 5-/5, biceps 5/5, triceps 5/5  Special Tests:    Positive: Neer's, Lynn', drop arm/painful arc and crossed arm adduction    Negative: supraspinatus (empty can), lift-off and Speed's      Independent visualization of the below image:  Recent Results (from the past 24 hour(s))   XR Shoulder Bilateral G/E 2 Views    Narrative    XR SHOULDER BILATERAL G/E 2 VIEWS 4/19/2023 11:25 AM     HISTORY: Chronic pain of both shoulders; Chronic pain of both  shoulders; Chronic pain of both shoulders    COMPARISON: None.         Impression    IMPRESSION: Degenerative change of the glenohumeral and  acromioclavicular joints bilaterally. " No evidence for fracture or  dislocation on either side. There is some undersurface irregularity to  the distal acromion bilaterally which could predispose to impingement.    LUIS ANGEL CHRIS MD         SYSTEM ID:  [id number]     Violet Salgado MD, [facility]        Again, thank you for allowing me to participate in the care of your patient.        Sincerely,        Violet Salgado MD

## 2023-04-19 NOTE — PROGRESS NOTES
ASSESSMENT & PLAN    1. Chronic pain of both shoulders    2. Osteoarthritis of both acromioclavicular joints    3. Tendinosis of rotator cuff    4. Rotator cuff impingement syndrome of right shoulder      Aurora Figueroa is a 83 year old female presenting for evaluation of chronic bilateral shoulder pain. History, examination and X-ray imaging are consistent with acromioclavicular and glenohumeral joint osteoarthritis, as well as rotator cuff tendinosis with impingement. Treatment options include pain management (topicals, NSAIDS, steroid injection), activity modification (avoiding painful activities), formal physical therapy and advanced imaging or surgical referral.    At this time, plan to proceed with the following:  - Activity as tolerated based on pain. Avoid painful activities and gradually increase as symptoms improve.   - Referral placed for formal physical therapy. Please call [phone] to schedule. Exercises to include pain free range of motion of the shoulder, scapular stabilization, stretching of the shoulder capsule, strengthening of the periscapular, rotator cuff, and deltoid muscles, with progression to functional rehabilitation with home exercise prescription.  - Heat or ice as needed for pain.   - Tylenol 1000 mg up to 3 times per day as needed for pain.  - Ibuprofen or topical Voltaren gel. Topical diclofenac (Voltaren) gel may be applied to the painful area 3-4 times per day for up to 2 weeks, then reduce to as-needed. This is an over-the-counter topical non-steroidal anti-inflammatory (NSAID) medication. Do not use with other NSAIDS like ibuprofen or advil.    Please schedule a follow up appointment after about 6-8 weeks of physical therapy if symptoms are not improving. You may call our direct clinic number ([phone]) at any time with questions or concerns.    Violet Salgado MD, [facility]    -----    SUBJECTIVE  Aurora STAPLES  Henry is a/an 83 year old Right handed female who is seen in consultation at the request of  Jasbir Garcia M.D. for evaluation of bilateral (currently right>left) shoulder pain. The patient is seen by themselves.    Onset: Years. Reports insidious onset without acute precipitating event.  Location of Pain: bilateral superior and lateral shoulders  Rating of Pain at worst: 5/10  Rating of Pain Currently: 1/10  Worsened by: vacuuming, chipping ice, carrying / lifting heavy items, reaching overhead  Better with: rest / activity avoidance  Treatments tried: rest/activity avoidance, sleeping with pillow underneath arm  Associated symptoms: no weakness, instability or mechanical symptoms  Orthopedic history: YES - see below  Relevant surgical history: YES - bilateral TKA, left YUAN  Social history: social history: retired    Past Medical History:   Diagnosis Date     Arrhythmia     tachycardia intermittant (metoprolol)     Basal cell carcinoma      Gastroesophageal reflux disease      Hyperlipidemia      Hypertension      Hypothyroidism      LUMBOSACRAL NEURITIS NOS 4/12/2007     Pemphigus 2015     Sleep apnea     does not wear CPAP (resolved after gastric bypass surgery)     Squamous cell carcinoma      Tachycardia      Social History     Socioeconomic History     Marital status:    Tobacco Use     Smoking status: Never     Smokeless tobacco: Never   Substance and Sexual Activity     Alcohol use: Yes     Alcohol/week: 0.0 standard drinks of alcohol     Comment: rare     Drug use: No   Other Topics Concern     Parent/sibling w/ CABG, MI or angioplasty before 65F 55M? No         Patient's past medical, surgical, social, and family histories were reviewed today and no changes are noted.    REVIEW OF SYSTEMS:  10 point ROS is negative other than symptoms noted above in HPI, Past Medical History or as stated below  Constitutional: NEGATIVE for fever, chills, change in weight  Skin: NEGATIVE for worrisome rashes,  "moles or lesions  GI/: NEGATIVE for bowel or bladder changes  Neuro: NEGATIVE for weakness, dizziness or paresthesias    OBJECTIVE:  Ht 1.651 m (5' 5\")   Wt 85.3 kg (188 lb)   LMP  (LMP Unknown)   BMI 31.28 kg/m     General: healthy, alert and in no distress  HEENT: no scleral icterus or conjunctival erythema  Skin: no suspicious lesions or rash. No jaundice.  CV: regular rhythm by palpation  Resp: normal respiratory effort without conversational dyspnea   Psych: normal mood and affect  Gait: normal steady gait with appropriate coordination and balance  Neuro: normal light touch sensory exam of the bilateral upper extremities.    MSK:  BILATERAL SHOULDER  Inspection:    No swelling, bruising, discoloration, or obvious deformity or asymmetry  Palpation:    Tender about the bilateral AC joint, anterior capsule and greater tuberosity. Remainder of bony and tendinous landmarks are nontender.  Active Range of Motion:     Abduction 1650, FF 1650, , IR L4.    Strength:    Scapular plane abduction 5-/5,  ER 5-/5, IR 5-/5, biceps 5/5, triceps 5/5  Special Tests:    Positive: Neer's, Lynn', drop arm/painful arc and crossed arm adduction    Negative: supraspinatus (empty can), lift-off and Speed's      Independent visualization of the below image:  Recent Results (from the past 24 hour(s))   XR Shoulder Bilateral G/E 2 Views    Narrative    XR SHOULDER BILATERAL G/E 2 VIEWS 4/19/2023 11:25 AM     HISTORY: Chronic pain of both shoulders; Chronic pain of both  shoulders; Chronic pain of both shoulders    COMPARISON: None.         Impression    IMPRESSION: Degenerative change of the glenohumeral and  acromioclavicular joints bilaterally. No evidence for fracture or  dislocation on either side. There is some undersurface irregularity to  the distal acromion bilaterally which could predispose to impingement.    LUIS ANGEL CHRIS MD         SYSTEM ID:  [id number]     Violet Salgado MD, [facility]

## 2023-04-19 NOTE — PATIENT INSTRUCTIONS
1. Chronic pain of both shoulders    2. Osteoarthritis of both acromioclavicular joints    3. Tendinosis of rotator cuff    4. Rotator cuff impingement syndrome of right shoulder      Aurora Figueroa is a 83 year old female presenting for evaluation of chronic bilateral shoulder pain. History, examination and X-ray imaging are consistent with osteoarthritis of the acromioclavicular joints (at the end of the collarbone) and rotator cuff tendinosis with impingement. Treatment options include pain management (topicals, NSAIDS, steroid injection), activity modification (avoiding painful activities), formal physical therapy and advanced imaging or surgical referral.    At this time, plan to proceed with the following:  - Activity as tolerated based on pain. Avoid painful activities and gradually increase as symptoms improve.   - Referral placed for formal physical therapy. Please call [phone] to schedule. Exercises to include pain free range of motion of the shoulder, scapular stabilization, stretching of the shoulder capsule, strengthening of the periscapular, rotator cuff, and deltoid muscles, with progression to functional rehabilitation with home exercise prescription.  - Heat or ice as needed for pain.   - Tylenol 1000 mg up to 3 times per day as needed for pain.  - Ibuprofen or topical Voltaren gel. Topical diclofenac (Voltaren) gel may be applied to the painful area 3-4 times per day for up to 2 weeks, then reduce to as-needed. This is an over-the-counter topical non-steroidal anti-inflammatory (NSAID) medication. Do not use with other NSAIDS like ibuprofen or advil.    Please schedule a follow up appointment after about 6-8 weeks of physical therapy if symptoms are not improving. You may call our direct clinic number ([phone]) at any time with questions or concerns.    Violet Salgado MD, [facility]

## 2023-05-04 ENCOUNTER — HOSPITAL ENCOUNTER (OUTPATIENT)
Dept: ULTRASOUND IMAGING | Facility: CLINIC | Age: 83
Discharge: HOME | End: 2023-05-04
Attending: INTERNAL MEDICINE | Admitting: INTERNAL MEDICINE
Payer: MEDICARE

## 2023-05-04 DIAGNOSIS — R14.0 ABDOMINAL BLOATING: ICD-10-CM

## 2023-05-04 PROCEDURE — 76700 US EXAM ABDOM COMPLETE: CPT

## 2023-05-10 ENCOUNTER — THERAPY VISIT (OUTPATIENT)
Dept: PHYSICAL THERAPY | Facility: CLINIC | Age: 83
End: 2023-05-10
Payer: MEDICARE

## 2023-05-10 DIAGNOSIS — M19.012 ARTHRITIS OF BOTH ACROMIOCLAVICULAR JOINTS: ICD-10-CM

## 2023-05-10 DIAGNOSIS — M19.011 ARTHRITIS OF BOTH ACROMIOCLAVICULAR JOINTS: ICD-10-CM

## 2023-05-10 DIAGNOSIS — M67.819 TENDINOSIS OF ROTATOR CUFF: ICD-10-CM

## 2023-05-10 DIAGNOSIS — M25.512 CHRONIC PAIN OF BOTH SHOULDERS: Primary | ICD-10-CM

## 2023-05-10 DIAGNOSIS — M25.512 CHRONIC PAIN OF BOTH SHOULDERS: ICD-10-CM

## 2023-05-10 DIAGNOSIS — M25.511 CHRONIC PAIN OF BOTH SHOULDERS: ICD-10-CM

## 2023-05-10 DIAGNOSIS — G89.29 CHRONIC PAIN OF BOTH SHOULDERS: ICD-10-CM

## 2023-05-10 DIAGNOSIS — G89.29 CHRONIC PAIN OF BOTH SHOULDERS: Primary | ICD-10-CM

## 2023-05-10 DIAGNOSIS — M25.511 CHRONIC PAIN OF BOTH SHOULDERS: Primary | ICD-10-CM

## 2023-05-10 DIAGNOSIS — M75.41 ROTATOR CUFF IMPINGEMENT SYNDROME OF RIGHT SHOULDER: ICD-10-CM

## 2023-05-10 PROCEDURE — 97161 PT EVAL LOW COMPLEX 20 MIN: CPT | Mod: GP | Performed by: PHYSICAL THERAPIST

## 2023-05-10 PROCEDURE — 97110 THERAPEUTIC EXERCISES: CPT | Mod: GP | Performed by: PHYSICAL THERAPIST

## 2023-05-10 NOTE — PROGRESS NOTES
Physical Therapy Initial Evaluation  Subjective:  The history is provided by the patient.   Patient Health History  Aurora Figueroa being seen for Right > left shoulder pain.     Problem began: 1/10/2023.   Problem occurred: unknown   Pain score: 0-8/10.  General health as reported by patient is fair.  Pertinent medical history includes: asthma, cancer, diabetes, numbness/tingling, osteoarthritis, overweight, sleep disorder/apnea and thyroid problems.   Red flags:  Cold/hot extremity (gets cold hand d/t carpal tunnel for past several years).  Medical allergies: latex.   Surgeries include:  Orthopedic surgery. Other surgery history details: mohs.    Current medications:  High blood pressure medication and thyroid medication.    Current occupation is retired.   Primary job tasks include:  Driving, lifting/carrying, pushing/pulling and repetitive tasks.                  Therapist Generated HPI Evaluation  Problem details: Onset of R>L shoulder pain several months ago for unknown reasons. Worse with reaching OH, lifting, carrying, and lying on her side. History of R carpal tunnel issues with surgery for past several years and get cold right hand and loss of  strength.         Type of problem:  Bilateral shoulders.    This is a chronic condition.  Condition occurred with:  Unknown cause.  Where condition occurred: for unknown reasons.  Patient reports pain:  Anterior, lateral, posterior and upper arm.  Pain is described as aching and shooting and is intermittent.  Pain is worse in the P.M..  Since onset symptoms are unchanged.  Symptoms are exacerbated by lying on extremity, using arm overhead, carrying and lifting  and relieved by rest.      Barriers include:  None as reported by patient.                        Objective:  Standing Alignment:    Cervical/Thoracic:  Forward head  Shoulder/UE:  Rounded shoulders                                  Cervical/Thoracic Evaluation    AROM:  AROM Cervical:    Flexion:             100%  Extension:       66%  Rotation:         Left: 80%     Right: 90%  Side Bend:      Left: 100%     Right:  50%                               Shoulder Evaluation:  ROM:  AROM:  normal (With PDM of flexion and abduction B.)                                  Strength:    Flexion: Left:5/5   Pain:    Right: 5/5     Pain:     Abduction:  Left: 5/5  Pain:    Right: 5/5     Pain:    Internal Rotation:  Left:5/5     Pain:    Right: 5/5     Pain:  External Rotation:   Left:5-/5     Pain:   Right:5-/5     Pain:              Special Tests:    Left shoulder positive for the following special tests:  Impingement  Left shoulder negative for the following special tests:  Rotator cuff tear  Right shoulder positive for the following special tests:Impingement  Right shoulder negative for the following special tests:Rotator cuff tear    Mobility Tests:    Glenohumeral anterior left:  Normal  Glenohumeral anterior right:  Normal  Glenohumeral posterior left:  Normal  Glenohumeral posterior right:  Normal                                             General     ROS    Assessment/Plan:    Patient is a 83 year old female with both sides shoulder complaints.  Signs and sx's consistent with impingement. She also has a major loss of right cervical SB.  Patient has the following significant findings with corresponding treatment plan.                Diagnosis 1:  B shoulder pain/impingement  Pain -  manual therapy, self management, education, directional preference exercise and home program  Decreased ROM/flexibility - manual therapy and therapeutic exercise  Decreased strength - therapeutic exercise and therapeutic activities  Decreased function - therapeutic activities  Impaired posture - neuro re-education    Therapy Evaluation Codes:   Cumulative Therapy Evaluation is: Low complexity.    Previous and current functional limitations:  (See Goal Flow Sheet for this information)    Short term and Long term goals: (See Goal Flow Sheet  for this information)     Communication ability:  Patient appears to be able to clearly communicate and understand verbal and written communication and follow directions correctly.  Treatment Explanation - The following has been discussed with the patient:   RX ordered/plan of care  Anticipated outcomes  Possible risks and side effects  This patient would benefit from PT intervention to resume normal activities.   Rehab potential is good.    Frequency:  1 X week, once daily  Duration:  for 3 weeks tapering to 2 X a month over 6 weeks  Discharge Plan:  Achieve all LTG.  Independent in home treatment program.  Reach maximal therapeutic benefit.    Please refer to the daily flowsheet for treatment today, total treatment time and time spent performing 1:1 timed codes.

## 2023-05-10 NOTE — PROGRESS NOTES
[facility]    OUTPATIENT Physical Therapy ORTHOPEDIC EVALUATION  PLAN OF TREATMENT FOR OUTPATIENT REHABILITATION  (COMPLETE FOR INITIAL CLAIMS ONLY)  Patient's Last Name, First Name, M.I.  YOB: 1940  Aurora Figueroa    Provider s Name:  [facility]   Medical Record No.  [id number]   Start of Care Date:  05/10/23   Onset Date:   01/10/23   Treatment Diagnosis:  B shoulder pain Medical Diagnosis:  Data Unavailable       Goals:     05/10/23 0500   Body Part   Goals listed below are for R>L shoulder pain   Goal #1   Goal #1 sleeping   Previous Functional Level No restrictions   Current Functional Level Minutes pt is able to lie on affected side   Performance Level 5 with 8/10 pain   STG Target Performance Minutes pt able to lie on affected side   Performance Level 20 with 4/10 pain   Rationale to establish restorative sleep pattern   Due Date 05/31/23   LTG Target Performance Hours pt able to lie on affected side   Performance level 2   Rationale to establish restorative sleep pattern   Due Date 07/12/23   Goal #2   Goal #2 reaching   Previous Functional Level No restrictions   Current Functional Level Can reach;overhead   Performance level with up to 8/10 pain   STG Target Performance Reach;overhead   Performance level with up to 4/10 pain   Rationale for dressing;for bathing;for meal preparation   Due date 05/31/23   LTG Target Performance Reach;overhead   Performance Level with 0/10 pain   Rationale for dressing;for bathing;for meal preparation   Due date 07/12/23         Therapy Frequency:  1x/wk x 3 wks; 2x/mnth x 6 wks  Predicted Duration of Therapy Intervention:  9 weeks    Nataly Chau, PT                 I CERTIFY THE NEED FOR THESE SERVICES FURNISHED UNDER        THIS PLAN OF TREATMENT AND WHILE UNDER MY CARE     (Physician attestation of this document  indicates review and certification of the therapy plan).                     Certification Date From:  05/10/23   Certification Date To:  07/12/23    Referring Provider:  Referred Self    Initial Assessment        See Epic Evaluation SOC Date: 05/10/23

## 2023-05-17 ENCOUNTER — THERAPY VISIT (OUTPATIENT)
Dept: PHYSICAL THERAPY | Facility: CLINIC | Age: 83
End: 2023-05-17
Payer: MEDICARE

## 2023-05-17 DIAGNOSIS — M67.819 TENDINOSIS OF ROTATOR CUFF: ICD-10-CM

## 2023-05-17 DIAGNOSIS — M19.012 ARTHRITIS OF BOTH ACROMIOCLAVICULAR JOINTS: ICD-10-CM

## 2023-05-17 DIAGNOSIS — M19.011 ARTHRITIS OF BOTH ACROMIOCLAVICULAR JOINTS: ICD-10-CM

## 2023-05-17 DIAGNOSIS — M25.512 CHRONIC PAIN OF BOTH SHOULDERS: ICD-10-CM

## 2023-05-17 DIAGNOSIS — G89.29 CHRONIC PAIN OF BOTH SHOULDERS: ICD-10-CM

## 2023-05-17 DIAGNOSIS — M75.41 ROTATOR CUFF IMPINGEMENT SYNDROME OF RIGHT SHOULDER: ICD-10-CM

## 2023-05-17 DIAGNOSIS — M25.511 CHRONIC PAIN OF BOTH SHOULDERS: ICD-10-CM

## 2023-05-17 PROCEDURE — 97110 THERAPEUTIC EXERCISES: CPT | Mod: GP | Performed by: PHYSICAL THERAPIST

## 2023-05-17 PROCEDURE — 97112 NEUROMUSCULAR REEDUCATION: CPT | Mod: GP | Performed by: PHYSICAL THERAPIST

## 2023-05-22 ENCOUNTER — THERAPY VISIT (OUTPATIENT)
Dept: PHYSICAL THERAPY | Facility: CLINIC | Age: 83
End: 2023-05-22
Payer: MEDICARE

## 2023-05-22 DIAGNOSIS — G89.29 CHRONIC PAIN OF BOTH SHOULDERS: Primary | ICD-10-CM

## 2023-05-22 DIAGNOSIS — M25.511 CHRONIC PAIN OF BOTH SHOULDERS: Primary | ICD-10-CM

## 2023-05-22 DIAGNOSIS — M25.512 CHRONIC PAIN OF BOTH SHOULDERS: Primary | ICD-10-CM

## 2023-05-22 PROCEDURE — 97110 THERAPEUTIC EXERCISES: CPT | Mod: GP | Performed by: PHYSICAL THERAPIST

## 2023-05-22 PROCEDURE — 97112 NEUROMUSCULAR REEDUCATION: CPT | Mod: GP | Performed by: PHYSICAL THERAPIST

## 2023-06-20 DIAGNOSIS — E78.00 HYPERCHOLESTEROLEMIA: ICD-10-CM

## 2023-06-20 RX ORDER — SIMVASTATIN 40 MG
TABLET ORAL
Qty: 90 TABLET | Refills: 3 | Status: SHIPPED | OUTPATIENT
Start: 2023-04-06 | End: 2024-05-13

## 2023-06-20 NOTE — TELEPHONE ENCOUNTER
Prescription approved per [facility] Refill Protocol.  Aleida Thornton, RN  [facility] Triage Nurse

## 2023-07-19 PROBLEM — M25.512 CHRONIC PAIN OF BOTH SHOULDERS: Status: RESOLVED | Noted: 2023-05-10 | Resolved: 2023-07-19

## 2023-07-19 PROBLEM — G89.29 CHRONIC PAIN OF BOTH SHOULDERS: Status: RESOLVED | Noted: 2023-05-10 | Resolved: 2023-07-19

## 2023-07-19 PROBLEM — M25.511 CHRONIC PAIN OF BOTH SHOULDERS: Status: RESOLVED | Noted: 2023-05-10 | Resolved: 2023-07-19

## 2023-07-19 NOTE — PROGRESS NOTES
DISCHARGE  Reason for Discharge: Patient has failed to schedule further appointments.    Equipment Issued: none    Discharge Plan: Patient to continue home program.    Referring Provider:  Referred Self       05/22/23 0500   Appointment Info   Signing clinician's name / credentials Nataly Chau PT   Total/Authorized Visits Plan 6   Visits Used 3   Medical Diagnosis chronic B shoulder pain   PT Tx Diagnosis B shoulder pain   Quick Adds Certification   Progress Note/Certification   Start of Care Date 05/10/23   Onset of illness/injury or Date of Surgery 01/10/23   Therapy Frequency 1x/wk x 3 wks; 2x/month x 6 wks   Predicted Duration 9 weeks   Certification date from 05/10/23   Certification date to 07/12/23   Progress Note Due Date 07/08/23   GOALS   PT Goals 2   PT Goal 1   Goal Identifier Goal 1-sleeping   Goal Description Patient able to lie on either side for 2 hours.   Rationale to maximize safety and independence within the home  (to establish restorative sleep pattern)   Target Date 07/12/23   PT Goal 2   Goal Identifier Goal 2--reaching   Goal Description Reach overhead with 0/10 pain   Rationale to maximize safety and independence with self cares  (For dressing, bathing, and meal prep.)   Target Date 07/12/23   Subjective Report   Subjective Report Housework, vacuuming is going better. The right shoulder can still ache while sitting. Had a busy week and didn't do the chin tucks as much. Is more aware of posture.   Objective Measures   Objective Measures Objective Measure 1;Objective Measure 2;Objective Measure 3   Objective Measure 1   Objective Measure Shoulder AROM   Details Full motion with slight L shoulder pain with abduction. B flexion and R abduction pain free today.   Objective Measure 2   Objective Measure CAROM   Details flex=100%, ext=75%, RR=90% (ERP), VX=141%, RSB=66%, PQY=376%, retr=40%.   Objective Measure 3   Objective Measure Posture.   Details Decreased FH, rounded shoulders but  "still present.   Treatment Interventions (PT)   Interventions Therapeutic Procedure/Exercise;Neuromuscular Re-education   Therapeutic Procedure/Exercise   Therapeutic Procedures: strength, endurance, ROM, flexibillity minutes (15859) 23   Ther Proc 1 PTRX picutres/instructions   Ther Proc 1 - Details Reviewed printed copy of HEP--pictures/instructions   PTRx Ther Proc 1 Cervical Retraction   PTRx Ther Proc 1 - Details 10 reps 10 reps with pt OP at chin--cues for end range 3x/day   PTRx Ther Proc 2 Posterior Shoulder Stretch with Elbow Straight   PTRx Ther Proc 2 - Details 30\" 2x both shoulders cues for angle 1-2x/day   PTRx Ther Proc 3 Shoulder Scaption Full Can   PTRx Ther Proc 3 - Details 6 ounces x 30 reps cues for posture/scap did at mirror for feedback every other day   PTRx Ther Proc 4 Shoulder Scapular Retraction with Tubing   PTRx Ther Proc 4 - Details 30 reps every other day focus on scap retr/depr   Skilled Intervention Instructed in stretching and RTC strengthening for improved shoulder function with daily activities like cleaning, cooking, carrying.   PTRx Ther Proc 5 Push-Up Plus At Counter   PTRx Ther Proc 5 - Details 20 reps cues for scap every other day. Practiced the \"plus\" standing with arms outstretched.   Neuromuscular Re-education   Neuromuscular re-ed of mvmt, balance, coord, kinesthetic sense, posture, proprioception minutes (99918) 15   Neuro Re-ed 1 Posture   Neuro Re-ed 1 - Details sitting and standing in neutral, postural cues throughout exercises   PTRx Neuro Re-ed 1 Shoulder Extension in Standing   PTRx Neuro Re-ed 1 - Details 6 oz x 30 reps cues for scap every other day   PTRx Neuro Re-ed 2 Shoulder Horizontal Abduction Standing   PTRx Neuro Re-ed 2 - Details 6 oz x 30 reps cues for scap every other day   PTRx Neuro Re-ed 3 Push-Up Plus At Counter   PTRx Neuro Re-ed 3 - Details 20 reps cues for scap every other day. Practiced the \"plus\" standing with arms outstretched.   Education "   Learner/Method Patient;Listening;Reading;Demonstration;Pictures/Video   Plan   Plan for next session Review current exercises to ensure proper form, progress strengthening, add counter push ups plus.   Total Session Time   Timed Code Treatment Minutes 38   Total Treatment Time (sum of timed and untimed services) 38

## 2023-08-11 ENCOUNTER — OFFICE VISIT (OUTPATIENT)
Dept: DERMATOLOGY | Facility: CLINIC | Age: 83
End: 2023-08-11
Payer: MEDICARE

## 2023-08-11 DIAGNOSIS — D18.01 ANGIOMA OF SKIN: ICD-10-CM

## 2023-08-11 DIAGNOSIS — L57.0 ACTINIC KERATOSIS: ICD-10-CM

## 2023-08-11 DIAGNOSIS — L81.4 LENTIGO: ICD-10-CM

## 2023-08-11 DIAGNOSIS — D22.9 NEVUS: Primary | ICD-10-CM

## 2023-08-11 DIAGNOSIS — L82.1 SEBORRHEIC KERATOSIS: ICD-10-CM

## 2023-08-11 PROCEDURE — 17000 DESTRUCT PREMALG LESION: CPT | Performed by: PHYSICIAN ASSISTANT

## 2023-08-11 PROCEDURE — 17003 DESTRUCT PREMALG LES 2-14: CPT | Performed by: PHYSICIAN ASSISTANT

## 2023-08-11 PROCEDURE — 99213 OFFICE O/P EST LOW 20 MIN: CPT | Mod: 25 | Performed by: PHYSICIAN ASSISTANT

## 2023-08-11 ASSESSMENT — PAIN SCALES - GENERAL: PAINLEVEL: NO PAIN (0)

## 2023-08-11 NOTE — LETTER
8/11/2023         RE: Aurora Figueroa  [address] [facility] [id number]        Dear Colleague,    Thank you for referring your patient, Aurora Figueroa, to the [facility]. Please see a copy of my visit note below.    HPI:   chief complaint: Aurora Figueroa is a 83 year old female who presents for Full skin cancer screening to rule out skin cancer.  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 grew up in [place]; has 6 children. Travels to [place] to visit her 3 daughters.  On review of systems, there are no further skin complaints, patient is feeling otherwise well.  See patient intake sheet.  ROS of the following were done and are negative: Constitutional, Eyes, Ears, Nose,   Mouth, Throat, Cardiovascular, Respiratory, GI, Genitourinary, Musculoskeletal,   Psychiatric, Endocrine, Allergic/Immunologic.      PHYSICAL EXAM:   LMP  (LMP Unknown)   Skin exam performed as follows: Type 2 skin. Mood appropriate  Alert and Oriented X 3. Well developed, well nourished in no distress.  General appearance: Normal  Head including face: Normal  Eyes: conjunctiva and lids: Normal  Mouth: Lips, teeth, gums: Normal  Neck: Normal  Chest-breast/axillae: Normal  Back: Normal  Spleen and liver: Normal  Cardiovascular: Exam of peripheral vascular system by observation for swelling, varicosities, edema: Normal  Genitalia: groin, buttocks: Normal  Extremities: digits/nails (clubbing): Normal  Eccrine and Apocrine glands:  Normal  Right upper extremity: Normal  Left upper extremity: Normal  Right lower extremity: Normal  Left lower extremity: Normal  Skin: Scalp and body hair: See below    Pt deferred exam of breasts, groin, buttocks: No    Other physical findings:  1. Multiple pigmented macules on extremities and trunk  2. Multiple pigmented macules on face, trunk and extremities  3. Multiple vascular papules on trunk, arms and legs  4. Multiple scattered keratotic plaques  5. Pink gritty papule on the right lateral cheek x 1, left nasal side wall x 1       Except as noted above, no other signs of skin cancer or melanoma.     ASSESSMENT/PLAN:   Benign Full skin cancer screening today.     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    Multiple benign appearing nevi on arms, legs and trunk. Discussed ABCDEs of melanoma and sunscreen.   Multiple lentigos on arms, legs and trunk. Advised benign, no treatment needed.  Multiple scattered angiomas. Advised benign, no treatment needed.   Seborrheic keratosis on arms, legs and trunk. Advised benign, no treatment needed.  Actinic keratosis on the right lateral cheek x 1, left nasal side wall x 1. As precancerous, cryosurgery performed. Advised on blistering and post-op care. Advised if not resolved in 1-2 months to return for evaluation               Follow-up: yearly FSE/PRN sooner     1.) Patient was asked about new and changing moles. YES  2.) Patient received a complete physical skin examination: YES  3.) Patient was counseled to perform a monthly self skin examination: YES  Scribed By: Marely Aaron, MS, PACOLLEEN      Again, thank you for allowing me to participate in the care of your patient.        Sincerely,        Marely Aaron PA-C

## 2023-08-11 NOTE — PROGRESS NOTES
HPI:   chief complaint: Aurora Figueroa is a 83 year old female who presents for Full skin cancer screening to rule out skin cancer.  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 grew up in [place]; has 6 children. Travels to [place] to visit her 3 daughters.  On review of systems, there are no further skin complaints, patient is feeling otherwise well.  See patient intake sheet.  ROS of the following were done and are negative: Constitutional, Eyes, Ears, Nose,   Mouth, Throat, Cardiovascular, Respiratory, GI, Genitourinary, Musculoskeletal,   Psychiatric, Endocrine, Allergic/Immunologic.      PHYSICAL EXAM:   LMP  (LMP Unknown)   Skin exam performed as follows: Type 2 skin. Mood appropriate  Alert and Oriented X 3. Well developed, well nourished in no distress.  General appearance: Normal  Head including face: Normal  Eyes: conjunctiva and lids: Normal  Mouth: Lips, teeth, gums: Normal  Neck: Normal  Chest-breast/axillae: Normal  Back: Normal  Spleen and liver: Normal  Cardiovascular: Exam of peripheral vascular system by observation for swelling, varicosities, edema: Normal  Genitalia: groin, buttocks: Normal  Extremities: digits/nails (clubbing): Normal  Eccrine and Apocrine glands: Normal  Right upper extremity: Normal  Left upper extremity: Normal  Right lower extremity: Normal  Left lower extremity: Normal  Skin: Scalp and body hair: See below    Pt deferred exam of breasts, groin, buttocks: No    Other physical findings:  1. Multiple pigmented macules on  extremities and trunk  2. Multiple pigmented macules on face, trunk and extremities  3. Multiple vascular papules on trunk, arms and legs  4. Multiple scattered keratotic plaques  5. Pink gritty papule on the right lateral cheek x 1, left nasal side wall x 1       Except as noted above, no other signs of skin cancer or melanoma.     ASSESSMENT/PLAN:   Benign Full skin cancer screening today.     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    Multiple benign appearing nevi on arms, legs and trunk. Discussed ABCDEs of melanoma and sunscreen.   Multiple lentigos on arms, legs and trunk. Advised benign, no treatment needed.  Multiple scattered angiomas. Advised benign, no treatment needed.   Seborrheic keratosis on arms, legs and trunk. Advised benign, no treatment needed.  Actinic keratosis on the right lateral cheek x 1, left nasal side wall x 1. As precancerous, cryosurgery performed. Advised on blistering and post-op care. Advised if not resolved in 1-2 months to return for evaluation               Follow-up: yearly FSE/PRN sooner     1.) Patient was asked about new and changing moles. YES  2.) Patient received a complete physical skin examination: YES  3.) Patient was counseled to perform a monthly self skin examination: YES  Scribed By: Marely Aaron MS, PACOLLEEN

## 2023-11-16 ENCOUNTER — NURSE TRIAGE (OUTPATIENT)
Dept: INTERNAL MEDICINE | Facility: CLINIC | Age: 83
End: 2023-11-16
Payer: MEDICARE

## 2023-11-16 DIAGNOSIS — U07.1 INFECTION DUE TO 2019 NOVEL CORONAVIRUS: Primary | ICD-10-CM

## 2023-11-16 NOTE — TELEPHONE ENCOUNTER
RN COVID TREATMENT VISIT  11/16/23      The patient has been triaged and does not require a higher level of care.    Aurora Figueroa  83 year old  Current weight? 188 lbs    Has the patient been seen by a primary care provider at an [facility] or [facility] within the past two years? Yes.   Have you been in close proximity to/do you have a known exposure to a person with a confirmed case of influenza? No.     General treatment eligibility:  Date of positive COVID test (PCR or at home)?  11/16/2023    Are you or have you been hospitalized for this COVID-19 infection? No.   Have you received monoclonal antibodies or antiviral treatment for COVID-19 since this positive test? No.   Do you have any of the following conditions that place you at risk of being very sick from COVID-19?   - Age 50 years or older  Yes, patient has at least one high risk condition as noted above.     Current COVID symptoms:   - cough  - sore throat  - congestion or runny nose  Yes. Patient has at least one symptom as selected.     How many days since symptoms started? 5 days or less. Established patient, 12 years or older weighing at least 88.2 lbs, who has symptoms that started in the past 5 days, has not been hospitalized nor received treatment already, and is at risk for being very sick from COVID-19.     Treatment eligibility by RN:  Are you currently pregnant or nursing? No  Do you have a clinically significant hypersensitivity to nirmatrelvir or ritonavir, or toxic epidermal necrolysis (TEN) or Nicole-Alejandro Syndrome? No  Do you have a history of hepatitis, any hepatic impairment on the Problem List (such as Child-Waters Class C, cirrhosis, fatty liver disease, alcoholic liver disease), or was the last liver lab (hepatic panel, ALT, AST, ALK Phos, bilirubin) elevated in the past 6 months? No  Do you have any history of severe renal impairment (eGFR < 30mL/min)? No    Is patient eligible to continue? Yes,  patient meets all eligibility requirements for the RN COVID treatment (as denoted by all no responses above).     Current Outpatient Medications   Medication Sig Dispense Refill    simvastatin (ZOCOR) 40 MG tablet TAKE 1 TABLET(40 MG) BY MOUTH DAILY 90 tablet 3    Cholecalciferol (VITAMIN D) 1000 UNITS capsule Take 1 capsule by mouth daily       Cyanocobalamin (VITAMIN B 12 PO) Take 2,500 mcg by mouth daily sublingual      ferrous sulfate (FEROSUL) 325 (65 Fe) MG tablet Take 325 mg by mouth twice a week       levothyroxine (SYNTHROID/LEVOTHROID) 50 MCG tablet TAKE 1 TABLET BY MOUTH EVERY DAY 90 tablet 3    methylcellulose (CITRUCEL) powder Take 3 teaspoonful by mouth daily       metoprolol succinate ER (TOPROL XL) 25 MG 24 hr tablet Take 1 tablet (25 mg) by mouth daily 90 tablet 3    Multiple Vitamins-Minerals (ICAPS AREDS 2 PO) Take by mouth 2 times daily       OMEPRAZOLE PO Take 20 mg by mouth daily       Pediatric Multivit-Minerals-C (FLINTSTONES COMPLETE PO) Take 1 tablet by mouth daily          Medications from List 1 of the standing order (on medications that exclude the use of Paxlovid) that patient is taking: NONE. Is patient taking Munising's Wort? No  Is patient taking Munising's Wort or any meds from List 1? No.   Medications from List 2 of the standing order (on meds that provider needs to adjust) that patient is taking: NONE. Is patient on any of the meds from List 2? No.   Medications from List 3 of standing order (on meds that a RN needs to adjust) that patient is taking: simvastatin (Zocor, FloLipid): Instructed patient to stop taking simvastatin while taking Paxlovid and first dose of Paxlovid must be at least 12 hours after last dose of simvastatin.  Instructed to restart simvastatin 5 days after the completion of Paxlovid.  Is patient on any meds from List 3? Yes. Patient is on meds from list 3. No meds require a provider visit and at least one med required RN to adjust.     Paxlovid has an  approximate 90% reduction in hospitalization. Paxlovid can possibly cause altered sense of taste, diarrhea (loose, watery stools), high blood pressure, muscle aches.     Would patient like a Paxlovid prescription?   Yes.   Lab Results   Component Value Date    GFRESTIMATED 87 01/20/2022       Was last eGFR reduced? No, eGFR 60 or greater/ No Result on record. Patient can receive the normal renal function dose. Paxlovid Rx sent to [facility]   [facility]    Temporary change to home medications: See Above    All medication adjustments (holds, etc) were discussed with the patient and patient was asked to repeat back (teachback) their med adjustment.  Did patient understand med adjustment? Yes, patient repeated back and understood correctly.        Reviewed the following instructions with the patient:    Paxlovid (nimatrelvir and ritonavir)    How it works  Two medicines (nirmatrelvir and ritonavir) are taken together. They stop the virus from growing. Less amount of virus is easier for your body to fight.    How to take  Medicine comes in a daily container with both medicine tablets. Take by mouth twice daily (once in the morning, once at night) for 5 days.  The number of tablets to take varies by patient.  Don't chew or break capsules. Swallow whole.    When to take  Take as soon as possible after positive COVID-19 test result, and within 5 days of your first symptoms.    Possible side effects  Can cause altered sense of taste, diarrhea (loose, watery stools), high blood pressure, muscle aches.    Vish Oh RN       Reason for Disposition   HIGH RISK patient (e.g., weak immune system, age > 64 years, obesity with BMI of 30 or higher, pregnant, chronic lung disease or other chronic medical condition) and COVID symptoms (e.g., cough, fever)  (Exceptions: Already seen by doctor or NP/PA and no new or worsening symptoms.)    Additional Information   Negative: SEVERE difficulty breathing (e.g., struggling for  each breath, speaks in single words)   Negative: Difficult to awaken or acting confused (e.g., disoriented, slurred speech)   Negative: Bluish (or gray) lips or face now   Negative: Shock suspected (e.g., cold/pale/clammy skin, too weak to stand, low BP, rapid pulse)   Negative: Sounds like a life-threatening emergency to the triager   Negative: Diagnosed or suspected COVID-19 and symptoms lasting 3 or more weeks   Negative: COVID-19 exposure and no symptoms   Negative: COVID-19 vaccine reaction suspected (e.g., fever, headache, muscle aches) occurring 1 to 3 days after getting vaccine   Negative: COVID-19 vaccine, questions about   Negative: Lives with someone known to have influenza (flu test positive) and flu-like symptoms (e.g., cough, runny nose, sore throat, SOB; with or without fever)   Negative: Possible COVID-19 symptoms and triager concerned about severity of symptoms or other causes   Negative: COVID-19 and breastfeeding, questions about   Negative: SEVERE or constant chest pain or pressure  (Exception: Mild central chest pain, present only when coughing.)   Negative: MODERATE difficulty breathing (e.g., speaks in phrases, SOB even at rest, pulse 100-120)   Negative: Headache and stiff neck (can't touch chin to chest)   Negative: Oxygen level (e.g., pulse oximetry) 90% or lower   Negative: Chest pain or pressure  (Exception: MILD central chest pain, present only when coughing.)   Negative: Drinking very little and dehydration suspected (e.g., no urine > 12 hours, very dry mouth, very lightheaded)   Negative: Patient sounds very sick or weak to the triager   Negative: MILD difficulty breathing (e.g., minimal/no SOB at rest, SOB with walking, pulse <100)   Negative: Fever > 103 F (39.4 C)   Negative: Fever > 101 F (38.3 C) and over 60 years of age   Negative: Fever > 100.0 F (37.8 C) and bedridden (e.g., CVA, chronic illness, recovering from surgery)    Protocols used: Coronavirus (COVID-19) Diagnosed or  [id number]

## 2023-11-16 NOTE — TELEPHONE ENCOUNTER
COVID Positive/Requesting COVID treatment    Patient is positive for COVID and requesting treatment options.    Date of positive COVID test (PCR or at home)? 11/16/2023  Current COVID symptoms: sore throat and congestion or runny nose  Date COVID symptoms began: 11/16/2023    Message should be routed to clinic RN pool. Best phone number to use for call back: [phone]

## 2024-02-29 ENCOUNTER — ANCILLARY PROCEDURE (OUTPATIENT)
Dept: GENERAL RADIOLOGY | Facility: CLINIC | Age: 84
End: 2024-02-29
Attending: PHYSICIAN ASSISTANT
Payer: MEDICARE

## 2024-02-29 ENCOUNTER — OFFICE VISIT (OUTPATIENT)
Dept: URGENT CARE | Facility: URGENT CARE | Age: 84
End: 2024-02-29
Payer: MEDICARE

## 2024-02-29 VITALS
HEART RATE: 63 BPM | TEMPERATURE: 97.8 F | OXYGEN SATURATION: 98 % | BODY MASS INDEX: 31.62 KG/M2 | DIASTOLIC BLOOD PRESSURE: 77 MMHG | RESPIRATION RATE: 15 BRPM | WEIGHT: 190 LBS | SYSTOLIC BLOOD PRESSURE: 114 MMHG

## 2024-02-29 DIAGNOSIS — R05.1 ACUTE COUGH: ICD-10-CM

## 2024-02-29 DIAGNOSIS — E03.4 HYPOTHYROIDISM DUE TO ACQUIRED ATROPHY OF THYROID: ICD-10-CM

## 2024-02-29 DIAGNOSIS — J40 BRONCHITIS: Primary | ICD-10-CM

## 2024-02-29 PROCEDURE — 99213 OFFICE O/P EST LOW 20 MIN: CPT | Performed by: PHYSICIAN ASSISTANT

## 2024-02-29 PROCEDURE — 71046 X-RAY EXAM CHEST 2 VIEWS: CPT | Mod: TC | Performed by: RADIOLOGY

## 2024-02-29 RX ORDER — LEVOTHYROXINE SODIUM 50 UG/1
TABLET ORAL
Qty: 90 TABLET | Refills: 0 | Status: SHIPPED | OUTPATIENT
Start: 2024-02-29 | End: 2024-05-13

## 2024-02-29 RX ORDER — BENZONATATE 200 MG/1
200 CAPSULE ORAL 3 TIMES DAILY PRN
Qty: 30 CAPSULE | Refills: 0 | Status: SHIPPED | OUTPATIENT
Start: 2024-02-29 | End: 2024-07-01

## 2024-02-29 RX ORDER — PREDNISONE 20 MG/1
20 TABLET ORAL 2 TIMES DAILY
Qty: 10 TABLET | Refills: 0 | Status: SHIPPED | OUTPATIENT
Start: 2024-02-29 | End: 2024-03-05

## 2024-02-29 NOTE — PROGRESS NOTES
Acute cough  - XR Chest 2 Views    Bronchitis  - predniSONE (DELTASONE) 20 MG tablet; Take 1 tablet (20 mg) by mouth 2 times daily for 5 days  - benzonatate (TESSALON) 200 MG capsule; Take 1 capsule (200 mg) by mouth 3 times daily as needed for cough    Age 12 months or more  Okay to use Zarbee's   Okay to use Rx Children Tylenol if prescribed (Dose based on weight)    Age 2-12:   Okay to use Children Motrin or Tylenol over the counter.    Adults:  Okay to take acetaminophen 500 mg- 2 tabs (Total of 1000 mg) every 8 hrs   Okay to take ibuprofen 200 mg- 3 tabs (Total of 600 mg) every 6 hours        Okay to use Neti pot for sinus lavage up to three times daily for congestion and sinus pressure if present. Daily hot shower can be beneficial for congestion and body aches. Okay to use bedroom vaporizer or humidifier if symptoms are worse at night. Nightly Vicks Vapor rub and 5-10 mg of Melatonin okay to use for sleep.     Over the counter cough medication and decongestants okay if not prescribed by me during this visit. For homeopathic alternatives to cough syrup and decongestant, feel free to try Elderberry extract.    Okay to use salt water gargles, warm tea (or warm water with lemon and honey), and lozenges for any throat discomfort. Chloraseptic spray is also highly encourages for throat pain/irritation.     Patient will need to get plenty of rest and drink at least 1.5-2 liters of fluids daily for adults and 1-1.5 liters for children. If vomiting and not tolerating liquids for more than 24 hrs, please go to your nearest emergency department for IV fluids and further treatment.     Patient is not contagious after 1 week from start of symptoms. If possible, wear mask for first 7 days. Wash hands regularly and vigorously for 30 seconds often.       Arias Arellano PA-C  [facility] URGENT [facility]ARE    Subjective   84 year old who presents to clinic today for the following health issues:    Urgent Care       HPI      Acute Illness  Acute illness concerns: Coughing and chest congestion   Onset/Duration: Friday   Symptoms:  Fever: No  Chills/Sweats: No  Headache (location?): back of the head   Sinus Pressure: No  Conjunctivitis:  No  Ear Pain: no  Rhinorrhea: No  Congestion: Chest congestion   Sore Throat: Started with a sore throat but not currently   Cough: YES  Wheeze: YES- Patient had asthma prior to having gastric bypass but no recent Hx of this   Decreased Appetite: No  Nausea: No  Vomiting: No  Diarrhea: No  Dysuria/Freq.: No  Dysuria or Hematuria: No  Fatigue/Achiness: No  Sick/Strep Exposure: None. Patient was negative for covid-19 at home   Therapies tried and outcome: Cough medication     Review of Systems   Review of Systems   See HPI    Objective    Temp: 97.8  F (36.6  C)   BP: 114/77 Pulse: 63   Resp: 15 SpO2: 98 %       Physical Exam   Physical Exam  Constitutional:       General: She is not in acute distress.     Appearance: Normal appearance. She is normal weight. She is not ill-appearing, toxic-appearing or diaphoretic.   HENT:      Head: Normocephalic and atraumatic.      Right Ear: Tympanic membrane, ear canal and external ear normal. There is no impacted cerumen.      Left Ear: Tympanic membrane, ear canal and external ear normal. There is no impacted cerumen.      Nose: Congestion and rhinorrhea present.      Mouth/Throat:      Mouth: Mucous membranes are moist.      Pharynx: Posterior oropharyngeal erythema present. No oropharyngeal exudate.   Cardiovascular:      Rate and Rhythm: Normal rate and regular rhythm.      Pulses: Normal pulses.      Heart sounds: Normal heart sounds. No murmur heard.     No friction rub. No gallop.   Pulmonary:      Effort: Pulmonary effort is normal. No respiratory distress.      Breath sounds: No stridor. Wheezing present. No rhonchi or rales.   Chest:      Chest wall: No tenderness.   Lymphadenopathy:      Cervical: No cervical adenopathy.   Neurological:      General: No  focal deficit present.      Mental Status: She is alert and oriented to person, place, and time. Mental status is at baseline.      Gait: Gait normal.   Psychiatric:         Mood and Affect: Mood normal.         Behavior: Behavior normal.         Thought Content: Thought content normal.         Judgment: Judgment normal.          Results for orders placed or performed in visit on 02/29/24 (from the past 24 hour(s))   XR Chest 2 Views    Narrative    CHEST TWO VIEWS   2/29/2024 3:51 PM     HISTORY: Acute cough.    COMPARISON: None.      Impression    IMPRESSION: No acute cardiopulmonary disease.     TORY GOEL MD         SYSTEM ID:  [id number]

## 2024-05-13 ENCOUNTER — ANCILLARY PROCEDURE (OUTPATIENT)
Dept: MAMMOGRAPHY | Facility: CLINIC | Age: 84
End: 2024-05-13
Attending: INTERNAL MEDICINE
Payer: MEDICARE

## 2024-05-13 DIAGNOSIS — Z12.31 VISIT FOR SCREENING MAMMOGRAM: ICD-10-CM

## 2024-05-13 DIAGNOSIS — I10 BENIGN ESSENTIAL HYPERTENSION: ICD-10-CM

## 2024-05-13 DIAGNOSIS — E03.4 HYPOTHYROIDISM DUE TO ACQUIRED ATROPHY OF THYROID: ICD-10-CM

## 2024-05-13 DIAGNOSIS — E78.00 HYPERCHOLESTEROLEMIA: ICD-10-CM

## 2024-05-13 PROCEDURE — 77067 SCR MAMMO BI INCL CAD: CPT | Mod: TC | Performed by: RADIOLOGY

## 2024-05-13 PROCEDURE — 77063 BREAST TOMOSYNTHESIS BI: CPT | Mod: TC | Performed by: RADIOLOGY

## 2024-05-13 NOTE — TELEPHONE ENCOUNTER
Medication Question or Refill    Contacts         Type Contact Phone/Fax    05/13/2024 03:09 PM CDT Phone (Incoming) Aurora Figueroa (Self) [phone] (H)            What medication are you calling about (include dose and sig)?: Levothyroxine, Simvastatin 40 mg and Metoprolol 25 mg    Preferred Pharmacy:   [id number] [address] [facility]  [address]  [facility] [address]  Phone: [phone] Fax: [phone]    [id number] - [facility] [address]  [address] [facility] [address]  Phone: [phone] Fax: [phone]      Controlled Substance Agreement on file:   CSA -- Patient Level:    CSA: None found at the patient level.       Who prescribed the medication?: Dr Garcia    Do you need a refill? Yes    When did you use the medication last? One week left for Levothyroxine and couple weeks for Simvastatin and metoprolol    Patient offered an appointment? Yes: First available 07/02/2024 and did set up appt with Dr Garcia    Do you have any questions or concerns?  No      Could we send this information to you in [facility] or would you prefer to receive a phone call?:   Patient would prefer a phone call   Okay to leave a detailed message?: Yes at Home number on file [phone] (home)

## 2024-05-13 NOTE — TELEPHONE ENCOUNTER
Please note if these are refill request please send them through the refill nurses protocol process to have placed for cosign

## 2024-05-14 RX ORDER — SIMVASTATIN 40 MG
40 TABLET ORAL DAILY
Qty: 90 TABLET | Refills: 0 | Status: SHIPPED | OUTPATIENT
Start: 2024-05-14 | End: 2024-05-23

## 2024-05-14 RX ORDER — LEVOTHYROXINE SODIUM 50 UG/1
TABLET ORAL
Qty: 90 TABLET | Refills: 0 | Status: SHIPPED | OUTPATIENT
Start: 2024-05-14 | End: 2024-05-20

## 2024-05-14 RX ORDER — METOPROLOL SUCCINATE 25 MG/1
25 TABLET, EXTENDED RELEASE ORAL DAILY
Qty: 90 TABLET | Refills: 0 | Status: SHIPPED | OUTPATIENT
Start: 2024-05-14 | End: 2024-05-23

## 2024-05-20 DIAGNOSIS — E03.4 HYPOTHYROIDISM DUE TO ACQUIRED ATROPHY OF THYROID: ICD-10-CM

## 2024-05-20 RX ORDER — LEVOTHYROXINE SODIUM 50 UG/1
TABLET ORAL
Qty: 90 TABLET | Refills: 0 | Status: SHIPPED | OUTPATIENT
Start: 2024-05-20 | End: 2024-07-02

## 2024-05-23 DIAGNOSIS — I10 BENIGN ESSENTIAL HYPERTENSION: ICD-10-CM

## 2024-05-23 DIAGNOSIS — E78.00 HYPERCHOLESTEROLEMIA: ICD-10-CM

## 2024-05-23 RX ORDER — SIMVASTATIN 40 MG
40 TABLET ORAL DAILY
Qty: 90 TABLET | Refills: 0 | Status: SHIPPED | OUTPATIENT
Start: 2024-05-23 | End: 2024-07-02

## 2024-05-23 RX ORDER — METOPROLOL SUCCINATE 25 MG/1
25 TABLET, EXTENDED RELEASE ORAL DAILY
Qty: 90 TABLET | Refills: 0 | Status: SHIPPED | OUTPATIENT
Start: 2024-05-23 | End: 2024-05-29

## 2024-05-23 NOTE — TELEPHONE ENCOUNTER
Reason for Call:  Medication or medication refill:    Do you use a [facility]?  Name of the pharmacy and phone number for the current request:  [facility] - [phone] Fax [phone]    Name of the medication requested: Simvastatin 40mg and metoprololer succinate 25 mg     Other request: other    Can we leave a detailed message on this number? YES    Phone number patient can be reached at: Home number on file [phone] (home)    Best Time: anytime    Call taken on 5/23/2024 at 4:42 PM by Eduarda Hinton MA

## 2024-05-29 DIAGNOSIS — I10 BENIGN ESSENTIAL HYPERTENSION: ICD-10-CM

## 2024-05-29 RX ORDER — METOPROLOL SUCCINATE 25 MG/1
25 TABLET, EXTENDED RELEASE ORAL DAILY
Qty: 90 TABLET | Refills: 0 | Status: SHIPPED | OUTPATIENT
Start: 2024-05-29 | End: 2024-07-02

## 2024-05-29 NOTE — TELEPHONE ENCOUNTER
Upon chart review, a script for this medication was sent to the pharmacy on 5/23/24 for 90 tablets. Script was sent to: [id number] - [place], [place] - [address].    Now receiving a [id number] [place], [place] - [address] AT [facility] [address]    Will route to triage to please call the patient and clarify what pharmacy she would like to use for her medications.    Thank you,  Aleida Thornton RN

## 2024-05-29 NOTE — TELEPHONE ENCOUNTER
Prescription approved per [facility] Refill Protocol.  Aleida Thornton, RN  [facility] Triage Nurse

## 2024-06-02 ENCOUNTER — HEALTH MAINTENANCE LETTER (OUTPATIENT)
Age: 84
End: 2024-06-02

## 2024-06-25 ENCOUNTER — TELEPHONE (OUTPATIENT)
Dept: INTERNAL MEDICINE | Facility: CLINIC | Age: 84
End: 2024-06-25
Payer: MEDICARE

## 2024-06-25 DIAGNOSIS — E78.00 HYPERCHOLESTEROLEMIA: ICD-10-CM

## 2024-06-29 DIAGNOSIS — E78.00 HYPERCHOLESTEROLEMIA: ICD-10-CM

## 2024-07-01 RX ORDER — SIMVASTATIN 40 MG
40 TABLET ORAL DAILY
Qty: 90 TABLET | Refills: 0 | OUTPATIENT
Start: 2024-07-01

## 2024-07-02 ENCOUNTER — OFFICE VISIT (OUTPATIENT)
Dept: INTERNAL MEDICINE | Facility: CLINIC | Age: 84
End: 2024-07-02
Payer: MEDICARE

## 2024-07-02 VITALS
OXYGEN SATURATION: 97 % | SYSTOLIC BLOOD PRESSURE: 111 MMHG | BODY MASS INDEX: 31.47 KG/M2 | TEMPERATURE: 96.7 F | WEIGHT: 188.9 LBS | HEART RATE: 68 BPM | HEIGHT: 65 IN | DIASTOLIC BLOOD PRESSURE: 74 MMHG | RESPIRATION RATE: 16 BRPM

## 2024-07-02 DIAGNOSIS — H61.23 BILATERAL IMPACTED CERUMEN: ICD-10-CM

## 2024-07-02 DIAGNOSIS — M79.643 PAIN OF HAND, UNSPECIFIED LATERALITY: ICD-10-CM

## 2024-07-02 DIAGNOSIS — Z12.31 VISIT FOR SCREENING MAMMOGRAM: ICD-10-CM

## 2024-07-02 DIAGNOSIS — E66.01 MORBID OBESITY (H): ICD-10-CM

## 2024-07-02 DIAGNOSIS — I10 BENIGN ESSENTIAL HYPERTENSION: ICD-10-CM

## 2024-07-02 DIAGNOSIS — Z00.00 ENCOUNTER FOR SUBSEQUENT ANNUAL WELLNESS VISIT IN MEDICARE PATIENT: Primary | ICD-10-CM

## 2024-07-02 DIAGNOSIS — E78.00 HYPERCHOLESTEROLEMIA: ICD-10-CM

## 2024-07-02 DIAGNOSIS — E03.4 HYPOTHYROIDISM DUE TO ACQUIRED ATROPHY OF THYROID: ICD-10-CM

## 2024-07-02 DIAGNOSIS — Z12.11 COLON CANCER SCREENING: ICD-10-CM

## 2024-07-02 DIAGNOSIS — Z78.0 ASYMPTOMATIC POSTMENOPAUSAL STATUS: ICD-10-CM

## 2024-07-02 LAB
ALBUMIN SERPL BCG-MCNC: 4.3 G/DL (ref 3.5–5.2)
ALP SERPL-CCNC: 93 U/L (ref 40–150)
ALT SERPL W P-5'-P-CCNC: 34 U/L (ref 0–50)
ANION GAP SERPL CALCULATED.3IONS-SCNC: 9 MMOL/L (ref 7–15)
AST SERPL W P-5'-P-CCNC: 38 U/L (ref 0–45)
BILIRUB SERPL-MCNC: 0.6 MG/DL
BUN SERPL-MCNC: 23 MG/DL (ref 8–23)
CALCIUM SERPL-MCNC: 9.7 MG/DL (ref 8.8–10.2)
CHLORIDE SERPL-SCNC: 105 MMOL/L (ref 98–107)
CHOLEST SERPL-MCNC: 166 MG/DL
CREAT SERPL-MCNC: 0.81 MG/DL (ref 0.51–0.95)
DEPRECATED HCO3 PLAS-SCNC: 28 MMOL/L (ref 22–29)
EGFRCR SERPLBLD CKD-EPI 2021: 72 ML/MIN/1.73M2
ERYTHROCYTE [DISTWIDTH] IN BLOOD BY AUTOMATED COUNT: 13.7 % (ref 10–15)
FASTING STATUS PATIENT QL REPORTED: YES
FASTING STATUS PATIENT QL REPORTED: YES
GLUCOSE SERPL-MCNC: 121 MG/DL (ref 70–99)
HCT VFR BLD AUTO: 43.3 % (ref 35–47)
HDLC SERPL-MCNC: 61 MG/DL
HGB BLD-MCNC: 14.1 G/DL (ref 11.7–15.7)
LDLC SERPL CALC-MCNC: 86 MG/DL
MCH RBC QN AUTO: 30.4 PG (ref 26.5–33)
MCHC RBC AUTO-ENTMCNC: 32.6 G/DL (ref 31.5–36.5)
MCV RBC AUTO: 93 FL (ref 78–100)
NONHDLC SERPL-MCNC: 105 MG/DL
PLATELET # BLD AUTO: 154 10E3/UL (ref 150–450)
POTASSIUM SERPL-SCNC: 4.2 MMOL/L (ref 3.4–5.3)
PROT SERPL-MCNC: 7.3 G/DL (ref 6.4–8.3)
RBC # BLD AUTO: 4.64 10E6/UL (ref 3.8–5.2)
SODIUM SERPL-SCNC: 142 MMOL/L (ref 135–145)
T4 FREE SERPL-MCNC: 1.2 NG/DL (ref 0.9–1.7)
TRIGL SERPL-MCNC: 95 MG/DL
TSH SERPL DL<=0.005 MIU/L-ACNC: 4.34 UIU/ML (ref 0.3–4.2)
WBC # BLD AUTO: 6.6 10E3/UL (ref 4–11)

## 2024-07-02 PROCEDURE — 36415 COLL VENOUS BLD VENIPUNCTURE: CPT | Performed by: INTERNAL MEDICINE

## 2024-07-02 PROCEDURE — 84439 ASSAY OF FREE THYROXINE: CPT | Performed by: INTERNAL MEDICINE

## 2024-07-02 PROCEDURE — 85027 COMPLETE CBC AUTOMATED: CPT | Performed by: INTERNAL MEDICINE

## 2024-07-02 PROCEDURE — 69209 REMOVE IMPACTED EAR WAX UNI: CPT | Performed by: INTERNAL MEDICINE

## 2024-07-02 PROCEDURE — 80061 LIPID PANEL: CPT | Performed by: INTERNAL MEDICINE

## 2024-07-02 PROCEDURE — G0439 PPPS, SUBSEQ VISIT: HCPCS | Performed by: INTERNAL MEDICINE

## 2024-07-02 PROCEDURE — 80053 COMPREHEN METABOLIC PANEL: CPT | Performed by: INTERNAL MEDICINE

## 2024-07-02 PROCEDURE — 84443 ASSAY THYROID STIM HORMONE: CPT | Performed by: INTERNAL MEDICINE

## 2024-07-02 PROCEDURE — 99214 OFFICE O/P EST MOD 30 MIN: CPT | Mod: 25 | Performed by: INTERNAL MEDICINE

## 2024-07-02 RX ORDER — LEVOTHYROXINE SODIUM 50 UG/1
TABLET ORAL
Qty: 90 TABLET | Refills: 3 | Status: SHIPPED | OUTPATIENT
Start: 2024-07-02

## 2024-07-02 RX ORDER — SIMVASTATIN 40 MG
40 TABLET ORAL DAILY
Qty: 90 TABLET | Refills: 3 | Status: SHIPPED | OUTPATIENT
Start: 2024-07-02

## 2024-07-02 RX ORDER — METOPROLOL SUCCINATE 25 MG/1
25 TABLET, EXTENDED RELEASE ORAL DAILY
Qty: 90 TABLET | Refills: 3 | Status: SHIPPED | OUTPATIENT
Start: 2024-07-02

## 2024-07-02 SDOH — HEALTH STABILITY: PHYSICAL HEALTH: ON AVERAGE, HOW MANY DAYS PER WEEK DO YOU ENGAGE IN MODERATE TO STRENUOUS EXERCISE (LIKE A BRISK WALK)?: 0 DAYS

## 2024-07-02 ASSESSMENT — SOCIAL DETERMINANTS OF HEALTH (SDOH): HOW OFTEN DO YOU GET TOGETHER WITH FRIENDS OR RELATIVES?: MORE THAN THREE TIMES A WEEK

## 2024-07-02 NOTE — PROGRESS NOTES
Preventive Care Visit  [facility]  Jasbir Garcia MD, Internal Medicine  Jul 2, 2024      Assessment & Plan     Encounter for subsequent annual wellness visit in Medicare patient  Advised patient to update routine vaccines accordingly.  - Comprehensive metabolic panel; Future  - CBC with platelets; Future  - Lipid Profile; Future    Benign essential hypertension  Stable on therapy and continuing with current medical management  - Comprehensive metabolic panel; Future  - metoprolol succinate ER (TOPROL XL) 25 MG 24 hr tablet; Take 1 tablet (25 mg) by mouth daily    Morbid obesity (H)  Encouraged ongoing weight loss and weight reduction.    Hypothyroidism due to acquired atrophy of thyroid  Continue with current levothyroxine therapy and recheck thyroid function panel.    - TSH with free T4 reflex; Future  - levothyroxine (SYNTHROID/LEVOTHROID) 50 MCG tablet; TAKE 1 TABLET BY MOUTH EVERY DAY    Hypercholesterolemia  Recheck lipid panel later continue with statin as directed  - Lipid Profile; Future  - simvastatin (ZOCOR) 40 MG tablet; Take 1 tablet (40 mg) by mouth daily    Visit for screening mammogram  Recent mammography reviewed with patient.    Colon cancer screening  Prior colonoscopy reviewed with patient and suggest FIT testing  - Fecal colorectal cancer screen FIT; Future    Pain of hand, unspecified laterality  Patient with prior history of carpal tunnel syndrome to the right now with left sided hand symptoms.  Requesting orthopedic referral which was placed  - Orthopedic  Referral; Future    Bilateral impacted cerumen  Irrigation of ears bilaterally performed.    Asymptomatic postmenopausal status  Bone density scan recommended and ordered and patient advised to schedule  - DX Bone Density; Future    Patient has been advised of split billing requirements and indicates understanding: Yes        BMI  Estimated body mass index is 31.43 kg/m  as calculated from the following:     "Height as of this encounter: 1.651 m (5' 5\").    Weight as of this encounter: 85.7 kg (188 lb 14.4 oz).   Weight management plan: Discussed healthy diet and exercise guidelines    Counseling  Appropriate preventive services were discussed with this patient, including applicable screening as appropriate for fall prevention, nutrition, physical activity, Tobacco-use cessation, weight loss and cognition.  Checklist reviewing preventive services available has been given to the patient.  Reviewed patient's diet, addressing concerns and/or questions.   The patient was instructed to see the dentist every 6 months.   She is at risk for psychosocial distress and has been provided with information to reduce risk.   Patient reported safety concerns were addressed today.The patient was provided with written information regarding signs of hearing loss.       Work on weight loss  Regular exercise    Pieter Simon is a 84 year old, presenting for the following:  Wellness Visit      Health Care Directive  Patient has a Health Care Directive on file  Advance care planning document is on file and is current.    HPI    Requesting referral for hand specialists for left hand.  Patient states that she has had prior surgery on her right hand for carpal tunnel syndrome and now has symptoms similar on her left hand.  She would like to be seen by an orthopedic specialist to further assessment.  She also has issues of mild dysuria management and she would like earlier potentially clean.        7/2/2024   General Health   How would you rate your overall physical health? (!) FAIR   Feel stress (tense, anxious, or unable to sleep) Only a little      (!) STRESS CONCERN      7/2/2024   Nutrition   Diet: Regular (no restrictions)            7/2/2024   Exercise   Days per week of moderate/strenous exercise 0 days      (!) EXERCISE CONCERN      7/2/2024   Social Factors   Frequency of gathering with friends or relatives More than three times " a week   Worry food won't last until get money to buy more No   Food not last or not have enough money for food? No   Do you have housing? (Housing is defined as stable permanent housing and does not include staying ouside in a car, in a tent, in an abandoned building, in an overnight shelter, or couch-surfing.) Yes   Are you worried about losing your housing? No   Lack of transportation? No   Unable to get utilities (heat,electricity)? No            7/2/2024   Fall Risk   Fallen 2 or more times in the past year? No   Trouble with walking or balance? No             7/2/2024   Activities of Daily Living- Home Safety   Needs help with the following daily activites None of the above   Safety concerns in the home Throw rugs in the hallway            7/2/2024   Dental   Dentist two times every year? (!) NO            7/2/2024   Hearing Screening   Hearing concerns? (!) I NEED TO ASK PEOPLE TO SPEAK UP OR REPEAT THEMSELVES.    (!) TROUBLE UNDESTANDING A SPEAKER IN A PUBLIC MEETING OR [employer] SERVICE.    (!) TROUBLE UNDERSTANDING SOFT OR WHISPERED SPEECH.       Multiple values from one day are sorted in reverse-chronological order         7/2/2024   Driving Risk Screening   Patient/family members have concerns about driving No            7/2/2024   General Alertness/Fatigue Screening   Have you been more tired than usual lately? No            7/2/2024   Urinary Incontinence Screening   Bothered by leaking urine in past 6 months No            7/2/2024   TB Screening   Were you born outside of the [place]? No            Today's PHQ-2 Score:       7/2/2024     8:08 AM   PHQ-2 ( 1999 [employer])   Q1: Little interest or pleasure in doing things 0   Q2: Feeling down, depressed or hopeless 0   PHQ-2 Score 0   Q1: Little interest or pleasure in doing things Not at all   Q2: Feeling down, depressed or hopeless Not at all   PHQ-2 Score 0           7/2/2024   Substance Use   Alcohol more than 3/day or more than 7/wk No   Do you have a  current opioid prescription? No   How severe/bad is pain from 1 to 10? 1/10   Do you use any other substances recreationally? No        Social History     Tobacco Use    Smoking status: Never    Smokeless tobacco: Never   Substance Use Topics    Alcohol use: Yes     Alcohol/week: 0.0 standard drinks of alcohol     Comment: rare    Drug use: No           5/13/2024   LAST FHS-7 RESULTS   1st degree relative breast or ovarian cancer No   Any relative bilateral breast cancer No   Any male have breast cancer No   Any ONE woman have BOTH breast AND ovarian cancer No   Any woman with breast cancer before 50yrs No   2 or more relatives with breast AND/OR ovarian cancer No   2 or more relatives with breast AND/OR bowel cancer No          Mammogram Screening - After age 74- determine frequency with patient based on health status, life expectancy and patient goals      Reviewed and updated as needed this visit by Provider                    Lab work is in process  Current providers sharing in care for this patient include:  Patient Care Team:  Jasbir Garcia MD as PCP - General (Internal Medicine)  Marely Aaron PA-C as Physician Assistant (Dermatology)  Marely Aaron PA-C as Assigned Surgical Provider  Jasbir Garcia MD as Assigned PCP  Violet Solitario MD as Assigned Musculoskeletal Provider    The following health maintenance items are reviewed in Epic and correct as of today:  Health Maintenance   Topic Date Due    COVID-19 Vaccine (7 - 2023-24 season) 02/26/2024    LIPID  04/06/2024    TSH W/FREE T4 REFLEX  04/06/2024    ANNUAL REVIEW OF HM ORDERS  04/06/2024    INFLUENZA VACCINE (1) 09/01/2024    MAMMO SCREENING  05/13/2025    MEDICARE ANNUAL WELLNESS VISIT  07/02/2025    FALL RISK ASSESSMENT  07/02/2025    DTAP/TDAP/TD IMMUNIZATION (4 - Td or Tdap) 07/30/2028    ADVANCE CARE PLANNING  07/02/2029    DEXA  09/17/2030    PHQ-2 (once per calendar year)  Completed    Pneumococcal Vaccine: 65+ Years   "Completed    ZOSTER IMMUNIZATION  Completed    RSV VACCINE (Pregnancy & 60+)  Completed    IPV IMMUNIZATION  Aged Out    HPV IMMUNIZATION  Aged Out    MENINGITIS IMMUNIZATION  Aged Out    RSV MONOCLONAL ANTIBODY  Aged Out         Review of Systems  CONSTITUTIONAL: NEGATIVE for fever, chills, change in weight  EYES: NEGATIVE for vision changes or irritation  ENT/MOUTH: NEGATIVE for ear, mouth and throat problems  RESP: NEGATIVE for significant cough or SOB  CV: NEGATIVE for chest pain, palpitations or peripheral edema  GI: NEGATIVE for nausea, abdominal pain, heartburn, or change in bowel habits  : NEGATIVE for frequency, dysuria, or hematuria  NEURO: NEGATIVE for weakness, dizziness or paresthesias  ENDOCRINE: NEGATIVE for temperature intolerance, skin/hair changes  HEME: NEGATIVE for bleeding problems  PSYCHIATRIC: NEGATIVE for changes in mood or affect     Objective    Exam  /74   Pulse 68   Temp (!) 96.7  F (35.9  C) (Temporal)   Resp 16   Ht 1.651 m (5' 5\")   Wt 85.7 kg (188 lb 14.4 oz)   LMP  (LMP Unknown)   SpO2 97%   BMI 31.43 kg/m     Estimated body mass index is 31.43 kg/m  as calculated from the following:    Height as of this encounter: 1.651 m (5' 5\").    Weight as of this encounter: 85.7 kg (188 lb 14.4 oz).    Physical Exam  GENERAL: alert and no distress  EYES: Eyes grossly normal to inspection, PERRL and conjunctivae and sclerae normal  HENT: Hearing aids are not elevated.  Ear canals with cerumen and TM's normal, nose and mouth without ulcers or lesions  RESP: lungs clear to auscultation - no rales, rhonchi or wheezes  CV: regular rate and rhythm, normal S1 S2, no S3 or S4, no murmur, click or rub, no peripheral edema  ABDOMEN: Mildly obese soft, nontender, no hepatosplenomegaly, no masses and bowel sounds normal  MS: no gross musculoskeletal defects noted, no edema  There is a scar noted to the right wrist consistent with prior carpal tunnel syndrome.  Tinel's sign is borderline " positive for left wrist  NEURO: No focal changes  PSYCH: mentation appears normal, affect normal/bright         7/2/2024   Mini Cog   Clock Draw Score 2 Normal   3 Item Recall 3 objects recalled   Mini Cog Total Score 5                 Signed Electronically by: Jasbir Garcia MD

## 2024-07-02 NOTE — NURSING NOTE
Patient identified using two patient identifiers. Ear exam showing wax occlusion completed by provider. Warm water was placed in both ear(s) via elephant ear

## 2024-07-06 PROCEDURE — 82274 ASSAY TEST FOR BLOOD FECAL: CPT | Performed by: INTERNAL MEDICINE

## 2024-07-08 ENCOUNTER — ANCILLARY PROCEDURE (OUTPATIENT)
Dept: BONE DENSITY | Facility: CLINIC | Age: 84
End: 2024-07-08
Attending: INTERNAL MEDICINE
Payer: MEDICARE

## 2024-07-08 DIAGNOSIS — Z78.0 ASYMPTOMATIC POSTMENOPAUSAL STATUS: ICD-10-CM

## 2024-07-08 PROCEDURE — 77081 DXA BONE DENSITY APPENDICULR: CPT | Mod: TC | Performed by: PHYSICIAN ASSISTANT

## 2024-07-08 PROCEDURE — 77080 DXA BONE DENSITY AXIAL: CPT | Mod: TC | Performed by: PHYSICIAN ASSISTANT

## 2024-07-09 LAB — HEMOCCULT STL QL IA: NEGATIVE

## 2024-07-23 NOTE — PROGRESS NOTES
CHIEF COMPLAINT:  Pain of the Left Hand    HISTORY OF PRESENT ILLNESS  Ms. Figueroa is a pleasant 84 year old year old female who presents to clinic today with left hand pain.  Aurora explains that she has similar symptoms to her right hand where she has carpal tunnel.     Onset: gradual  Location: left hand  Quality:  tingling  Duration: 6-8 months   Severity: 6/10 at worst, more concerned about tingling sensation  Timing:intermittent episodes   Modifying factors:  resting and non-use makes it better, movement and use makes it worse  Associated signs & symptoms: tingling  Previous similar pain: Yes, on the right side   Treatments to date: No    Additional history: as documented    Review of Systems:  A 10-point review of systems was obtained and is negative except for as noted in the HPI.     MEDICAL HISTORY  Patient Active Problem List   Diagnosis    B-complex deficiency    Trigger finger    Ganglion cyst    Bariatric surgery status - 2009    Glucose intolerance (impaired glucose tolerance)    Hypothyroidism due to acquired atrophy of thyroid    ACP (advance care planning)    Failed total knee arthroplasty, initial encounter  (H24)    Benign essential hypertension    Status post total right knee replacement on 2/4/16 by Vincent Allen MD    Class 1 obesity due to excess calories with serious comorbidity and body mass index (BMI) of 32.0 to 32.9 in adult    Hypercholesterolemia    Tachycardia since 1978    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     Primary osteoarthritis of right hip    Morbid obesity (H)       Current Outpatient Medications   Medication Sig Dispense Refill    Cholecalciferol (VITAMIN D) 1000 UNITS capsule Take 1 capsule by mouth daily       Cyanocobalamin (VITAMIN B 12 PO) Take 2,500 mcg by mouth daily sublingual      ferrous sulfate (FEROSUL) 325 (65 Fe) MG tablet Take 325 mg by mouth twice a week       levothyroxine (SYNTHROID/LEVOTHROID) 50 MCG tablet TAKE 1 TABLET BY  MOUTH EVERY DAY 90 tablet 3    methylcellulose (CITRUCEL) powder Take 3 teaspoonful by mouth daily       metoprolol succinate ER (TOPROL XL) 25 MG 24 hr tablet Take 1 tablet (25 mg) by mouth daily 90 tablet 3    Multiple Vitamins-Minerals (ICAPS AREDS 2 PO) Take by mouth 2 times daily       OMEPRAZOLE PO Take 20 mg by mouth daily       Pediatric Multivit-Minerals-C (FLINTSTONES COMPLETE PO) Take 1 tablet by mouth daily       simvastatin (ZOCOR) 40 MG tablet Take 1 tablet (40 mg) by mouth daily 90 tablet 3       Allergies   Allergen Reactions    Food Itching     Raw fruit & veg:  Apples, cherries, cantalope, tomatoes,  carrotts.  Eye & throat irritations.    Penicillins Hives    Fruit [[place]]      Hives, itchy throat tomatoes    Nsaids Other (See Comments)     Patient had gastric bypass surgery.  Should not take oral  NSAID's ever.    Animal Dander      Pruritis,itchi eyes, throat and ears.    Erythromycin GI Disturbance    Erythromycin Ethylsuccinate GI Disturbance    Pollen Extract Itching       Family History   Problem Relation Age of Onset    Heart Disease Mother     Skin Cancer Mother     Unknown/Adopted Father     Heart Disease Sister     Skin Cancer Sister     Skin Cancer Brother     Heart Disease Son     Diabetes No family hx of     Coronary Artery Disease No family hx of     Hypertension No family hx of     Hyperlipidemia No family hx of     Cerebrovascular Disease No family hx of     Breast Cancer No family hx of     Colon Cancer No family hx of     Prostate Cancer No family hx of     Other Cancer No family hx of     Depression No family hx of     Anxiety Disorder No family hx of     Mental Illness No family hx of     Substance Abuse No family hx of     Anesthesia Reaction No family hx of     Asthma No family hx of     Osteoporosis No family hx of     Genetic Disorder No family hx of     Thyroid Disease No family hx of     Obesity No family hx of        Additional medical/Social/Surgical histories reviewed  in EPIC and updated as appropriate.       PHYSICAL EXAM  BP (!) 147/81   Pulse (!) 49   Wt 85.3 kg (188 lb)   LMP  (LMP Unknown)   BMI 31.28 kg/m      General  - normal appearance, in no obvious distress  CV  - normal radial pulse  Pulm  - normal respiratory pattern, non-labored  Musculoskeletal - left wrist  - inspection: No thenar atrophy of left hand, thenar atrophy is present on right hand, normal joint alignment, no swelling  - palpation: no bony or soft tissue tenderness, no tenderness at the anatomical snuffbox  - ROM:  90 deg flexion   70 deg extension   25 deg abduction   65 deg adduction  - strength: 5/5 A-OK sign, 5/5 Palmar abduction of thumb intact, 5/5 hand intrinsics,   - special tests:  (+) Tinel's  (-) Finkelstein  (+) Phalen  (+) Median nerve compression test  (-) Olmstead click test  Neuro  -No sensory disturbance, no motor deficit, grossly normal coordination, normal muscle tone  Skin  - no ecchymosis, erythema, warmth, or induration, no obvious rash  Psych  - interactive, appropriate, normal mood and affect    IMAGING : Deferred     ASSESSMENT & PLAN  Ms. Figueroa is a 84 year old year old female with past medical history of carpal tunnel syndrome of right wrist status post surgery and residual neurologic deficits due to prolonged compression who presents today with similar left hand paresthesias of digits 1 through 4 over the past 6 to 8 months.    Clinical examination with positive Tinel, positive Phalen testing and distribution of paresthesias is quite classic for carpal tunnel syndrome of left hand.  Fortunately she has not experienced any neurologic deficits or weakness.    Diagnosis: Carpal tunnel syndrome of left hand    Initial treatment options discussed including night splinting as well as home exercise program.  She would like to start with these treatments first.  Discussed briefly occupational hand therapy which she would like to hold off on.  Discussed additional carpal tunnel  injections that we can consider as well as further diagnostic measures such as EMG which would be important prior to consideration for surgery.  I also discussed that if she starts to have any increasing weakness, atrophy of the thenar region, loss of sensation, or dexterity that I would recommend an EMG immediately.    Aurora can follow-up with me in the next 8-12 weeks if she experiences ongoing symptoms despite our treatment protocol above.  At that time we may reengage discussion of ultrasound-guided carpal tunnel injection, OT, versus EMG.    It was a pleasure seeing Aurora today.    Suraj Maxwell DO, CAQSM  Primary Care Sports Medicine

## 2024-07-25 ENCOUNTER — OFFICE VISIT (OUTPATIENT)
Dept: ORTHOPEDICS | Facility: CLINIC | Age: 84
End: 2024-07-25
Payer: MEDICARE

## 2024-07-25 VITALS
HEART RATE: 49 BPM | SYSTOLIC BLOOD PRESSURE: 147 MMHG | DIASTOLIC BLOOD PRESSURE: 81 MMHG | BODY MASS INDEX: 31.28 KG/M2 | WEIGHT: 188 LBS

## 2024-07-25 DIAGNOSIS — G56.02 CARPAL TUNNEL SYNDROME OF LEFT WRIST: ICD-10-CM

## 2024-07-25 PROCEDURE — 99213 OFFICE O/P EST LOW 20 MIN: CPT | Performed by: FAMILY MEDICINE

## 2024-07-25 NOTE — LETTER
7/25/2024      Aurora Figueroa  [address] [address]      Dear Colleague,    Thank you for referring your patient, Aurora Figueroa, to the [facility] [place]. Please see a copy of my visit note below.    CHIEF COMPLAINT:  Pain of the Left Hand    HISTORY OF PRESENT ILLNESS  Ms. Figueroa is a pleasant 84 year old year old female who presents to clinic today with left hand pain.  Aurora explains that she has similar symptoms to her right hand where she has carpal tunnel.     Onset: gradual  Location: left hand  Quality:  tingling  Duration: 6-8 months   Severity: 6/10 at worst, more concerned about tingling sensation  Timing:intermittent episodes   Modifying factors:  resting and non-use makes it better, movement and use makes it worse  Associated signs & symptoms: tingling  Previous similar pain: Yes, on the right side   Treatments to date: No    Additional history: as documented    Review of Systems:  A 10-point review of systems was obtained and is negative except for as noted in the HPI.     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 by Vincent Allen MD     Class 1 obesity due to excess calories with serious comorbidity and body mass index (BMI) of 32.0 to 32.9 in adult     Hypercholesterolemia     Tachycardia since 1978     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      Primary osteoarthritis of right hip     Morbid obesity (H)       Current Outpatient Medications   Medication Sig Dispense Refill     Cholecalciferol (VITAMIN D) 1000  UNITS capsule Take 1 capsule by mouth daily        Cyanocobalamin (VITAMIN B 12 PO) Take 2,500 mcg by mouth daily sublingual       ferrous sulfate (FEROSUL) 325 (65 Fe) MG tablet Take 325 mg by mouth twice a week        levothyroxine (SYNTHROID/LEVOTHROID) 50 MCG tablet TAKE 1 TABLET BY MOUTH EVERY DAY 90 tablet 3     methylcellulose (CITRUCEL) powder Take 3 teaspoonful by mouth daily        metoprolol succinate ER (TOPROL XL) 25 MG 24 hr tablet Take 1 tablet (25 mg) by mouth daily 90 tablet 3     Multiple Vitamins-Minerals (ICAPS AREDS 2 PO) Take by mouth 2 times daily        OMEPRAZOLE PO Take 20 mg by mouth daily        Pediatric Multivit-Minerals-C (FLINTSTONES COMPLETE PO) Take 1 tablet by mouth daily        simvastatin (ZOCOR) 40 MG tablet Take 1 tablet (40 mg) by mouth daily 90 tablet 3       Allergies   Allergen Reactions     Food Itching     Raw fruit & veg:  Apples, cherries, cantalope, tomatoes,  carrotts.  Eye & throat irritations.     Penicillins Hives     Fruit [Cherry]      Hives, itchy throat tomatoes     Nsaids Other (See Comments)     Patient had gastric bypass surgery.  Should not take oral  NSAID's ever.     Animal Dander      Pruritis,itchi eyes, throat and ears.     Erythromycin GI Disturbance     Erythromycin Ethylsuccinate GI Disturbance     Pollen Extract Itching       Family History   Problem Relation Age of Onset     Heart Disease Mother      Skin Cancer Mother      Unknown/Adopted Father      Heart Disease Sister      Skin Cancer Sister      Skin Cancer Brother      Heart Disease Son      Diabetes No family hx of      Coronary Artery Disease No family hx of      Hypertension No family hx of      Hyperlipidemia No family hx of      Cerebrovascular Disease No family hx of      Breast Cancer No family hx of      Colon Cancer No family hx of      Prostate Cancer No family hx of      Other Cancer No family hx of      Depression No family hx of      Anxiety Disorder No family hx of      Mental  Illness No family hx of      Substance Abuse No family hx of      Anesthesia Reaction No family hx of      Asthma No family hx of      Osteoporosis No family hx of      Genetic Disorder No family hx of      Thyroid Disease No family hx of      Obesity No family hx of        Additional medical/Social/Surgical histories reviewed in [facility] and updated as appropriate.       PHYSICAL EXAM  BP (!) 147/81   Pulse (!) 49   Wt 85.3 kg (188 lb)   LMP  (LMP Unknown)   BMI 31.28 kg/m      General  - normal appearance, in no obvious distress  CV  - normal radial pulse  Pulm  - normal respiratory pattern, non-labored  Musculoskeletal - left wrist  - inspection: No thenar atrophy of left hand, thenar atrophy is present on right hand, normal joint alignment, no swelling  - palpation: no bony or soft tissue tenderness, no tenderness at the anatomical snuffbox  - ROM:  90 deg flexion   70 deg extension   25 deg abduction   65 deg adduction  - strength: 5/5 A-OK sign, 5/5 Palmar abduction of thumb intact, 5/5 hand intrinsics,   - special tests:  (+) Tinel's  (-) Finkelstein  (+) Phalen  (+) Median nerve compression test  (-) Olmstead click test  Neuro  -No sensory disturbance, no motor deficit, grossly normal coordination, normal muscle tone  Skin  - no ecchymosis, erythema, warmth, or induration, no obvious rash  Psych  - interactive, appropriate, normal mood and affect    IMAGING : Deferred     ASSESSMENT & PLAN  Ms. Figueroa is a 84 year old year old female with past medical history of carpal tunnel syndrome of right wrist status post surgery and residual neurologic deficits due to prolonged compression who presents today with similar left hand paresthesias of digits 1 through 4 over the past 6 to 8 months.    Clinical examination with positive Tinel, positive Phalen testing and distribution of paresthesias is quite classic for carpal tunnel syndrome of left hand.  Fortunately she has not experienced any neurologic deficits or  weakness.    Diagnosis: Carpal tunnel syndrome of left hand    Initial treatment options discussed including night splinting as well as home exercise program.  She would like to start with these treatments first.  Discussed briefly occupational hand therapy which she would like to hold off on.  Discussed additional carpal tunnel injections that we can consider as well as further diagnostic measures such as EMG which would be important prior to consideration for surgery.  I also discussed that if she starts to have any increasing weakness, atrophy of the thenar region, loss of sensation, or dexterity that I would recommend an EMG immediately.    Aurora can follow-up with me in the next 8-12 weeks if she experiences ongoing symptoms despite our treatment protocol above.  At that time we may reengage discussion of ultrasound-guided carpal tunnel injection, OT, versus EMG.    It was a pleasure seeing Aurora today.    Suraj Maxwell DO, [facility]  Primary Care Sports Medicine       Again, thank you for allowing me to participate in the care of your patient.        Sincerely,        Suraj Maxwell DO

## 2024-07-25 NOTE — PATIENT INSTRUCTIONS
Thank you for choosing [facility]    [facility] CLIN[facility] LOCATIONS  [place] [address] [address] [facility], [address]th [place]   [place], [place], [address] [place] [address]   [phone]       APPOINTMENTS: [phone]    CARE QUESTIONS: [phone],    BILLING QUESTIONS: [phone]    FAX NUMBER: [phone]          1. Pain of hand, unspecified laterality

## 2024-08-13 ENCOUNTER — OFFICE VISIT (OUTPATIENT)
Dept: DERMATOLOGY | Facility: CLINIC | Age: 84
End: 2024-08-13
Payer: MEDICARE

## 2024-08-13 ENCOUNTER — TELEPHONE (OUTPATIENT)
Dept: INTERNAL MEDICINE | Facility: CLINIC | Age: 84
End: 2024-08-13

## 2024-08-13 DIAGNOSIS — L81.4 LENTIGO: ICD-10-CM

## 2024-08-13 DIAGNOSIS — D22.9 NEVUS: Primary | ICD-10-CM

## 2024-08-13 DIAGNOSIS — Z85.828 HISTORY OF SKIN CANCER: ICD-10-CM

## 2024-08-13 DIAGNOSIS — L82.1 SEBORRHEIC KERATOSIS: ICD-10-CM

## 2024-08-13 DIAGNOSIS — L57.0 ACTINIC KERATOSIS: ICD-10-CM

## 2024-08-13 DIAGNOSIS — D18.01 ANGIOMA OF SKIN: ICD-10-CM

## 2024-08-13 PROCEDURE — 99213 OFFICE O/P EST LOW 20 MIN: CPT | Mod: 25 | Performed by: PHYSICIAN ASSISTANT

## 2024-08-13 PROCEDURE — 17003 DESTRUCT PREMALG LES 2-14: CPT | Performed by: PHYSICIAN ASSISTANT

## 2024-08-13 PROCEDURE — 17000 DESTRUCT PREMALG LESION: CPT | Performed by: PHYSICIAN ASSISTANT

## 2024-08-13 NOTE — PROGRESS NOTES
HPI:   chief complaint: Aurora Figueroa is a 89year old female who presents for Full skin cancer screening to rule out skin cancer.  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 Doing ok now though.  grew up in [place]; has 6 children. Travels to [place] to visit her 3 daughters.  On review of systems, there are no further skin complaints, patient is feeling otherwise well.  See patient intake sheet.  ROS of the following were done and are negative: Constitutional, Eyes, Ears, Nose,   Mouth, Throat, Cardiovascular, Respiratory, GI, Genitourinary, Musculoskeletal,   Psychiatric, Endocrine, Allergic/Immunologic.      PHYSICAL EXAM:   LMP  (LMP Unknown)   Skin exam performed as follows: Type 2 skin. Mood appropriate  Alert and Oriented X 3. Well developed, well nourished in no distress.  General appearance: Normal  Head including face: Normal  Eyes: conjunctiva and lids: Normal  Mouth: Lips, teeth, gums: Normal  Neck: Normal  Chest-breast/axillae: Normal  Back: Normal  Spleen and liver: Normal  Cardiovascular: Exam of peripheral vascular system by observation for swelling, varicosities, edema: Normal  Genitalia: groin, buttocks: Normal  Extremities: digits/nails (clubbing): Normal  Eccrine and Apocrine glands: Normal  Right upper extremity: Normal  Left upper extremity: Normal  Right lower extremity: Normal  Left lower extremity: Normal  Skin: Scalp and body hair: See below    Pt deferred exam of breasts, groin, buttocks: No    Other physical findings:  1. Multiple  pigmented macules on extremities and trunk  2. Multiple pigmented macules on face, trunk and extremities  3. Multiple vascular papules on trunk, arms and legs  4. Multiple scattered keratotic plaques  5. Pink gritty papule on the right lateral cheek x 1, right nasal side wall x 1, left nasal side wall x 1, right upper lip x 1, left supra brow x 1       Except as noted above, no other signs of skin cancer or melanoma.     ASSESSMENT/PLAN:   Benign Full skin cancer screening today.     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    Multiple benign appearing nevi on arms, legs and trunk. Discussed ABCDEs of melanoma and sunscreen.   Multiple lentigos on arms, legs and trunk. Advised benign, no treatment needed.  Multiple scattered angiomas. Advised benign, no treatment needed.   Seborrheic keratosis on arms, legs and trunk. Advised benign, no treatment needed.  Actinic keratosis on the right lateral cheek x 1, right nasal side wall x 1, left nasal side wall x 1, right upper lip x 1, left supra brow x 1. As precancerous, cryosurgery performed. Advised on blistering and post-op care. Advised if not resolved in 1-2 months to return for evaluation               Follow-up: yearly FSE/PRN sooner     1.) Patient was asked about new and changing moles. YES  2.) Patient received a complete physical skin examination: YES  3.) Patient was counseled to perform a monthly self skin examination: YES  Scribed By: Marely Aaron, MS, PACOLLEEN

## 2024-08-13 NOTE — LETTER
8/13/2024      Aurora Figueroa  [address] [address]      Dear Colleague,    Thank you for referring your patient, Aurora Figueroa, to the [facility]. Please see a copy of my visit note below.    HPI:   chief complaint: Aurora Figueroa is a 89year old female who presents for Full skin cancer screening to rule out skin cancer.  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 Doing ok now though.  grew up in [place]; has 6 children. Travels to [place] to visit her 3 daughters.  On review of systems, there are no further skin complaints, patient is feeling otherwise well.  See patient intake sheet.  ROS of the following were done and are negative: Constitutional, Eyes, Ears, Nose,   Mouth, Throat, Cardiovascular, Respiratory, GI, Genitourinary, Musculoskeletal,   Psychiatric, Endocrine, Allergic/Immunologic.      PHYSICAL EXAM:   LMP  (LMP Unknown)   Skin exam performed as follows: Type 2 skin. Mood appropriate  Alert and Oriented X 3. Well developed, well nourished in no distress.  General appearance: Normal  Head including face: Normal  Eyes: conjunctiva and lids: Normal  Mouth: Lips, teeth, gums: Normal  Neck: Normal  Chest-breast/axillae: Normal  Back: Normal  Spleen and liver: Normal  Cardiovascular: Exam of peripheral vascular system by observation for swelling, varicosities, edema: Normal  Genitalia: groin, buttocks: Normal  Extremities: digits/nails (clubbing): Normal  Eccrine and Apocrine glands:  Normal  Right upper extremity: Normal  Left upper extremity: Normal  Right lower extremity: Normal  Left lower extremity: Normal  Skin: Scalp and body hair: See below    Pt deferred exam of breasts, groin, buttocks: No    Other physical findings:  1. Multiple pigmented macules on extremities and trunk  2. Multiple pigmented macules on face, trunk and extremities  3. Multiple vascular papules on trunk, arms and legs  4. Multiple scattered keratotic plaques  5. Pink gritty papule on the right lateral cheek x 1, right nasal side wall x 1, left nasal side wall x 1, right upper lip x 1, left supra brow x 1       Except as noted above, no other signs of skin cancer or melanoma.     ASSESSMENT/PLAN:   Benign Full skin cancer screening today.     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    Multiple benign appearing nevi on arms, legs and trunk. Discussed ABCDEs of melanoma and sunscreen.   Multiple lentigos on arms, legs and trunk. Advised benign, no treatment needed.  Multiple scattered angiomas. Advised benign, no treatment needed.   Seborrheic keratosis on arms, legs and trunk. Advised benign, no treatment needed.  Actinic keratosis on the right lateral cheek x 1, right nasal side wall x 1, left nasal side wall x 1, right upper lip x 1, left supra brow x 1. As precancerous, cryosurgery performed. Advised on blistering and post-op care. Advised if not resolved in 1-2 months to return for evaluation               Follow-up: yearly FSE/PRN sooner     1.) Patient was asked about new and changing moles. YES  2.) Patient received a complete physical skin examination: YES  3.) Patient was counseled to perform a monthly self skin examination: YES  Scribed By: Marely Aaron, MS, PA-C      Again, thank you for allowing me to participate in the care of your patient.        Sincerely,        Marely Aaron,  XOCHITL

## 2024-08-13 NOTE — TELEPHONE ENCOUNTER
Reason for Call:  Medication or medication refill:    Do you use a [facility]?  Name of the pharmacy and phone number for the current request   [facility]    Name of the medication requested  levothyroxine     Other request pt wants an adjustment made on this medication was last seen 7/2/2024     Can we leave a detailed message on this number? YES    Phone number patient can be reached at: Home number on file [phone] (home)    Best Time  anytime asap please pt will be out soon pt has appt with provider 11/13/2024     Call taken on 8/13/2024 at 11:27 AM by Shi Medina

## 2024-08-13 NOTE — TELEPHONE ENCOUNTER
Spoke with patient and informed of medication was sent for one year she gave verbal understanding.

## 2024-08-13 NOTE — TELEPHONE ENCOUNTER
Please inform patient levothyroxine was sent for 1 year supply on 7/2 to [facility]     They can call the pharmacy to have this filled    Conrado Caban RN

## 2024-08-15 ENCOUNTER — OFFICE VISIT (OUTPATIENT)
Dept: URGENT CARE | Facility: URGENT CARE | Age: 84
End: 2024-08-15
Payer: MEDICARE

## 2024-08-15 VITALS
RESPIRATION RATE: 16 BRPM | HEART RATE: 44 BPM | WEIGHT: 190 LBS | SYSTOLIC BLOOD PRESSURE: 135 MMHG | DIASTOLIC BLOOD PRESSURE: 81 MMHG | BODY MASS INDEX: 31.62 KG/M2 | OXYGEN SATURATION: 98 % | TEMPERATURE: 97.1 F

## 2024-08-15 DIAGNOSIS — M54.50 ACUTE LEFT-SIDED LOW BACK PAIN WITHOUT SCIATICA: Primary | ICD-10-CM

## 2024-08-15 DIAGNOSIS — R00.1 BRADYCARDIA: ICD-10-CM

## 2024-08-15 LAB
ALBUMIN UR-MCNC: NEGATIVE MG/DL
APPEARANCE UR: CLEAR
BACTERIA #/AREA URNS HPF: ABNORMAL /HPF
BILIRUB UR QL STRIP: NEGATIVE
COLOR UR AUTO: YELLOW
GLUCOSE UR STRIP-MCNC: NEGATIVE MG/DL
HGB UR QL STRIP: NEGATIVE
KETONES UR STRIP-MCNC: NEGATIVE MG/DL
LEUKOCYTE ESTERASE UR QL STRIP: ABNORMAL
NITRATE UR QL: NEGATIVE
PH UR STRIP: 6 [PH] (ref 5–7)
RBC #/AREA URNS AUTO: ABNORMAL /HPF
SP GR UR STRIP: <=1.005 (ref 1–1.03)
SQUAMOUS #/AREA URNS AUTO: ABNORMAL /LPF
UROBILINOGEN UR STRIP-ACNC: 0.2 E.U./DL
WBC #/AREA URNS AUTO: ABNORMAL /HPF

## 2024-08-15 PROCEDURE — 81001 URINALYSIS AUTO W/SCOPE: CPT | Performed by: NURSE PRACTITIONER

## 2024-08-15 PROCEDURE — 93000 ELECTROCARDIOGRAM COMPLETE: CPT | Performed by: NURSE PRACTITIONER

## 2024-08-15 PROCEDURE — 99213 OFFICE O/P EST LOW 20 MIN: CPT | Mod: 24 | Performed by: NURSE PRACTITIONER

## 2024-08-15 ASSESSMENT — PAIN SCALES - GENERAL: PAINLEVEL: MILD PAIN (3)

## 2024-08-15 NOTE — PROGRESS NOTES
Chief Complaint   Patient presents with    Urgent Care     Three days back pain preventing standing and walking. Denies Dysuria, denies cloudy urine or urine with an off smell.  Tylenol isn't touching the problem.           ICD-10-CM    1. Acute left-sided low back pain without sciatica  M54.50 UA Macroscopic with reflex to Microscopic and Culture - Clinic Collect     UA Microscopic with Reflex to Culture      2. Bradycardia  R00.1 EKG 12-lead complete w/read - Clinics      No evidence of urinary tract infection as cause of low back pain.  Will treat this as musculoskeletal pain and have her use Tylenol, heat and ice.    She is quite bradycardic today.  Electrocardiogram shows sinus bradycardia with a PVC, there is no change from previous electrocardiograms.  She does take metoprolol which will certainly lower her heart rate.  Asked patient to follow-up with Dr. Garcia to see if he would like her to decrease her metoprolol dosage.  Blood pressure is well-maintained so this is not an urgent concern.  She will monitor blood pressure at home and keep a log to share with Dr. Garcia at next appointment.    Red flag warning signs and when to go to the emergency room discussed.  Reviewed potential adverse reactions to medications.    EKG - Reviewed and interpreted by me sinus bradycardia, with PVC, unchanged from previous tracings    Results for orders placed or performed in visit on 08/15/24 (from the past 24 hour(s))   UA Macroscopic with reflex to Microscopic and Culture - Clinic Collect    Specimen: Urine, Clean Catch   Result Value Ref Range    Color Urine Yellow Colorless, Straw, Light Yellow, Yellow    Appearance Urine Clear Clear    Glucose Urine Negative Negative mg/dL    Bilirubin Urine Negative Negative    Ketones Urine Negative Negative mg/dL    Specific Gravity Urine <=1.005 1.003 - 1.035    Blood Urine Negative Negative    pH Urine 6.0 5.0 - 7.0    Protein Albumin Urine Negative Negative mg/dL     Urobilinogen Urine 0.2 0.2, 1.0 E.U./dL    Nitrite Urine Negative Negative    Leukocyte Esterase Urine Small (A) Negative   UA Microscopic with Reflex to Culture   Result Value Ref Range    Bacteria Urine Few (A) None Seen /HPF    RBC Urine 0-2 0-2 /HPF /HPF    WBC Urine 0-5 0-5 /HPF /HPF    Squamous Epithelials Urine Few (A) None Seen /LPF    Narrative    Urine Culture not indicated       Subjective     Aurora Figueroa is an 84 year old female who presents to clinic today for left-sided low back pain for 3 days.  It does not hurt if she is up and walking but when she transitions from sitting to walking she does have discomfort in this area.  She denies any trauma, fever or urinary symptoms.  She has not been constipated.    Objective    /81 (BP Location: Left arm)   Pulse (!) 44   Temp 97.1  F (36.2  C) (Tympanic)   Resp 16   Wt 86.2 kg (190 lb)   LMP  (LMP Unknown)   SpO2 98%   BMI 31.62 kg/m    Nurses notes and VS have been reviewed.    Physical Exam       GENERAL APPEARANCE: healthy appearing, alert     NECK: no adenopathy or asymmetry, thyroid normal to palpation     RESP: lungs clear to auscultation - no rales, rhonchi or wheezes     CV: regular rates and rhythm, no murmurs, rubs, or gallop     ABDOMEN:  soft, nontender, no HSM or masses and bowel sounds normal, no CVA tenderness     MS: extremities normal- no gross deformities noted; normal muscle tone.  She does have slight tenderness over the left low back musculature with firm palpation     SKIN: no suspicious lesions or rashes     NEURO: Normal strength and tone, mentation intact and speech normal      Argenis Quevedo, BETTY, CNP  [facility] Provider    The use of Dragon/[employer] dictation services may have been used to construct the content in this note; any grammatical or spelling errors are non-intentional. Please contact the author of this note directly if you are in need of any clarification.

## 2024-11-12 NOTE — PROGRESS NOTES
Assessment & Plan     Hypothyroidism due to acquired atrophy of thyroid  Current values noted to be subclinical.  Suggest continuing with current dosing and recheck thyroid function panel.  - TSH with free T4 reflex; Future    Hyperglycemia  Discussed low carbohydrate restricted diet and exercise.  Recheck A1c levels.  - Glucose; Future  - Hemoglobin A1c; Future    Morbid obesity (H)  Encouraged ongoing weight loss and weight reduction.    Chronic right shoulder pain  Discussed options with patient.  At this point may benefit from assessment for intra-articular joint injection, orthopedic referral placed  - Orthopedic  Referral; Future    Vaginal spotting  Suggest checking hemoglobin, pelvic ultrasound and referral to OB/GYN for further assessment and potential need for hormonal assessment and/or endometrial biopsy.  - Ob/Gyn  Referral; Future  - US Pelvic Complete with Transvaginal; Future  - CBC with platelets; Future      The longitudinal plan of care for the diagnosis(es)/condition(s) as documented were addressed during this visit. Due to the added complexity in care, I will continue to support Aurora in the subsequent management and with ongoing continuity of care.      Work on weight loss  Regular exercise    Subjective   Aurora is a 84 year old, presenting for the following health issues:  Results, Shoulder right, and Vaginal Bleeding    History of Present Illness       Diabetes:   She presents for follow up of diabetes.    She is not checking blood glucose.         She has no concerns regarding her diabetes at this time.  She is having numbness in feet.            Hypertension: She presents for follow up of hypertension.  She does not check blood pressure  regularly outside of the clinic. Outpatient blood pressures have not been over 140/90. She does not follow a low salt diet.     Hypothyroidism:     Since last visit, patient describes the following symptoms::  None    Reason for  visit:  Follow up on A1c    She eats 2-3 servings of fruits and vegetables daily.She consumes 1 sweetened beverage(s) daily.She exercises with enough effort to increase her heart rate 9 or less minutes per day.  She exercises with enough effort to increase her heart rate 3 or less days per week.   She is taking medications regularly.       Follow up glucose and thyroid labs from 7/2/24    Hypothyroidism Follow-up    Since last visit, patient describes the following symptoms: Weight stable, no hair loss, no skin changes, no constipation, no loose stools    Other concerns:  Right shoulder pain, radiates to upper arm. Previously did PT but now pain has returned.  No obvious trauma.  She is interested in potential orthopedic referral.  Vaginal spotting daily x 1 month.  Noted prior h/o similar issues several years ago and intermittent since.  She states that occasionally she will note some pink staining on her toilet tissue but now pretty much daily for the last months she has noted blood where she has had to wear a pad.  She has not had this assessed as she has been taking care of her daughter [place] of [place] who has had health issues.  She does not report any alessandro or unrelenting pain but does occasionally make comment about some discomfort.      Current Outpatient Medications   Medication Sig Dispense Refill    Cholecalciferol (VITAMIN D) 1000 UNITS capsule Take 1 capsule by mouth daily       Cyanocobalamin (VITAMIN B 12 PO) Take 2,500 mcg by mouth daily sublingual      ferrous sulfate (FEROSUL) 325 (65 Fe) MG tablet Take 325 mg by mouth twice a week       levothyroxine (SYNTHROID/LEVOTHROID) 50 MCG tablet TAKE 1 TABLET BY MOUTH EVERY DAY 90 tablet 3    methylcellulose (CITRUCEL) powder Take 3 teaspoonful by mouth daily      metoprolol succinate ER (TOPROL XL) 25 MG 24 hr tablet Take 1 tablet (25 mg) by mouth daily 90 tablet 3    Multiple Vitamins-Minerals (ICAPS AREDS 2 PO) Take by mouth 2 times daily       OMEPRAZOLE  "PO Take 20 mg by mouth daily       Pediatric Multivit-Minerals-C (FLINTSTONES COMPLETE PO) Take 1 tablet by mouth daily       simvastatin (ZOCOR) 40 MG tablet Take 1 tablet (40 mg) by mouth daily 90 tablet 3     No current facility-administered medications for this visit.       Review of Systems  CONSTITUTIONAL: NEGATIVE for fever, chills, change in weight  EYES: NEGATIVE for vision changes or irritation  ENT/MOUTH: NEGATIVE for ear, mouth and throat problems  RESP: NEGATIVE for significant cough or SOB  CV: NEGATIVE for chest pain, palpitations or peripheral edema  GI: NEGATIVE for nausea, abdominal pain, heartburn, or change in bowel habits  : NEGATIVE for frequency, dysuria, or hematuria  MUSCULOSKELETAL: NEGATIVE for significant arthralgias or myalgia  NEURO: NEGATIVE for weakness, dizziness or paresthesias  ENDOCRINE: NEGATIVE for temperature intolerance, skin/hair changes  HEME: NEGATIVE for bleeding problems  PSYCHIATRIC: NEGATIVE for changes in mood or affect      Objective    /70   Pulse 66   Temp 98  F (36.7  C) (Temporal)   Resp 19   Ht 1.651 m (5' 5\")   Wt 81.8 kg (180 lb 6.4 oz)   LMP  (LMP Unknown)   SpO2 99%   BMI 30.02 kg/m    Body mass index is 30.02 kg/m .  Physical Exam   GENERAL: alert and no distress  EYES: Eyes grossly normal to inspection, PERRL and conjunctivae and sclerae normal  HENT: ear canals and TM's normal, nose and mouth without ulcers or lesions  NECK: no adenopathy, no asymmetry, masses, or scars  RESP: lungs clear to auscultation - no rales, rhonchi or wheezes  CV: regular rate and rhythm, normal S1 S2, no S3 or S4, no click or rub, no peripheral edema  ABDOMEN: soft, nontender, no hepatosplenomegaly, no masses and bowel sounds normal  NEURO: No focal changes  PSYCH: mentation appears normal, affect normal/bright        Signed Electronically by: Jasbir Garcia MD    "

## 2024-11-13 ENCOUNTER — OFFICE VISIT (OUTPATIENT)
Dept: INTERNAL MEDICINE | Facility: CLINIC | Age: 84
End: 2024-11-13
Payer: MEDICARE

## 2024-11-13 VITALS
BODY MASS INDEX: 30.06 KG/M2 | RESPIRATION RATE: 19 BRPM | HEIGHT: 65 IN | OXYGEN SATURATION: 99 % | TEMPERATURE: 98 F | DIASTOLIC BLOOD PRESSURE: 70 MMHG | SYSTOLIC BLOOD PRESSURE: 122 MMHG | WEIGHT: 180.4 LBS | HEART RATE: 66 BPM

## 2024-11-13 DIAGNOSIS — N93.9 VAGINAL SPOTTING: ICD-10-CM

## 2024-11-13 DIAGNOSIS — G89.29 CHRONIC RIGHT SHOULDER PAIN: ICD-10-CM

## 2024-11-13 DIAGNOSIS — M25.511 CHRONIC RIGHT SHOULDER PAIN: ICD-10-CM

## 2024-11-13 DIAGNOSIS — E03.4 HYPOTHYROIDISM DUE TO ACQUIRED ATROPHY OF THYROID: Primary | Chronic | ICD-10-CM

## 2024-11-13 DIAGNOSIS — E66.01 MORBID OBESITY (H): ICD-10-CM

## 2024-11-13 DIAGNOSIS — R73.9 HYPERGLYCEMIA: ICD-10-CM

## 2024-11-13 LAB
ERYTHROCYTE [DISTWIDTH] IN BLOOD BY AUTOMATED COUNT: 13.9 % (ref 10–15)
EST. AVERAGE GLUCOSE BLD GHB EST-MCNC: 120 MG/DL
FASTING STATUS PATIENT QL REPORTED: YES
GLUCOSE SERPL-MCNC: 125 MG/DL (ref 70–99)
HBA1C MFR BLD: 5.8 % (ref 0–5.6)
HCT VFR BLD AUTO: 42.8 % (ref 35–47)
HGB BLD-MCNC: 14 G/DL (ref 11.7–15.7)
MCH RBC QN AUTO: 30.6 PG (ref 26.5–33)
MCHC RBC AUTO-ENTMCNC: 32.7 G/DL (ref 31.5–36.5)
MCV RBC AUTO: 94 FL (ref 78–100)
PLATELET # BLD AUTO: 152 10E3/UL (ref 150–450)
RBC # BLD AUTO: 4.57 10E6/UL (ref 3.8–5.2)
TSH SERPL DL<=0.005 MIU/L-ACNC: 2.64 UIU/ML (ref 0.3–4.2)
WBC # BLD AUTO: 6.1 10E3/UL (ref 4–11)

## 2024-11-13 PROCEDURE — 85027 COMPLETE CBC AUTOMATED: CPT | Performed by: INTERNAL MEDICINE

## 2024-11-13 PROCEDURE — 36415 COLL VENOUS BLD VENIPUNCTURE: CPT | Performed by: INTERNAL MEDICINE

## 2024-11-13 PROCEDURE — 99214 OFFICE O/P EST MOD 30 MIN: CPT | Performed by: INTERNAL MEDICINE

## 2024-11-13 PROCEDURE — 82947 ASSAY GLUCOSE BLOOD QUANT: CPT | Performed by: INTERNAL MEDICINE

## 2024-11-13 PROCEDURE — G2211 COMPLEX E/M VISIT ADD ON: HCPCS | Performed by: INTERNAL MEDICINE

## 2024-11-13 PROCEDURE — 84443 ASSAY THYROID STIM HORMONE: CPT | Performed by: INTERNAL MEDICINE

## 2024-11-13 PROCEDURE — 83036 HEMOGLOBIN GLYCOSYLATED A1C: CPT | Performed by: INTERNAL MEDICINE

## 2024-11-14 ENCOUNTER — PATIENT OUTREACH (OUTPATIENT)
Dept: CARE COORDINATION | Facility: CLINIC | Age: 84
End: 2024-11-14
Payer: MEDICARE

## 2024-11-15 ENCOUNTER — ANCILLARY PROCEDURE (OUTPATIENT)
Dept: ULTRASOUND IMAGING | Facility: CLINIC | Age: 84
End: 2024-11-15
Attending: INTERNAL MEDICINE
Payer: MEDICARE

## 2024-11-15 DIAGNOSIS — N93.9 VAGINAL SPOTTING: ICD-10-CM

## 2024-11-15 PROCEDURE — 76856 US EXAM PELVIC COMPLETE: CPT | Performed by: OBSTETRICS & GYNECOLOGY

## 2024-11-15 PROCEDURE — 76830 TRANSVAGINAL US NON-OB: CPT | Performed by: OBSTETRICS & GYNECOLOGY

## 2024-11-18 ENCOUNTER — TELEPHONE (OUTPATIENT)
Dept: INTERNAL MEDICINE | Facility: CLINIC | Age: 84
End: 2024-11-18
Payer: MEDICARE

## 2024-11-18 NOTE — TELEPHONE ENCOUNTER
----- Message from Jasbir Garcia sent at 11/18/2024  6:43 AM CST -----  Please inform patient of message sent but not reviewed       Aurora,     Your thyroid function tests look good and thus I would not change anything at this point.     Your Hemoglobin A1C and blood sugar tests look good and I would continue with your medication without change.  These tests should be repeated in 6 months.     Your hemoglobin is also normal.        Dr. Garcia   Written by Jasbir Garcia MD on 11/13/2024  7:47 PM CST

## 2024-11-22 ENCOUNTER — ANCILLARY PROCEDURE (OUTPATIENT)
Dept: GENERAL RADIOLOGY | Facility: CLINIC | Age: 84
End: 2024-11-22
Attending: STUDENT IN AN ORGANIZED HEALTH CARE EDUCATION/TRAINING PROGRAM
Payer: MEDICARE

## 2024-11-22 DIAGNOSIS — G89.29 CHRONIC RIGHT SHOULDER PAIN: ICD-10-CM

## 2024-11-22 DIAGNOSIS — M25.511 CHRONIC RIGHT SHOULDER PAIN: ICD-10-CM

## 2024-11-22 PROCEDURE — 73030 X-RAY EXAM OF SHOULDER: CPT | Mod: TC | Performed by: RADIOLOGY

## 2024-12-02 ENCOUNTER — THERAPY VISIT (OUTPATIENT)
Dept: PHYSICAL THERAPY | Facility: CLINIC | Age: 84
End: 2024-12-02
Attending: STUDENT IN AN ORGANIZED HEALTH CARE EDUCATION/TRAINING PROGRAM
Payer: MEDICARE

## 2024-12-02 DIAGNOSIS — M19.011 ARTHRITIS OF RIGHT GLENOHUMERAL JOINT: ICD-10-CM

## 2024-12-02 DIAGNOSIS — G89.29 CHRONIC RIGHT SHOULDER PAIN: ICD-10-CM

## 2024-12-02 DIAGNOSIS — M25.511 CHRONIC RIGHT SHOULDER PAIN: ICD-10-CM

## 2024-12-02 DIAGNOSIS — M19.011 ARTHRITIS OF RIGHT ACROMIOCLAVICULAR JOINT: ICD-10-CM

## 2024-12-02 DIAGNOSIS — M75.41 ROTATOR CUFF IMPINGEMENT SYNDROME OF RIGHT SHOULDER: ICD-10-CM

## 2024-12-02 PROCEDURE — 97110 THERAPEUTIC EXERCISES: CPT | Mod: GP | Performed by: PHYSICAL THERAPIST

## 2024-12-02 PROCEDURE — 97161 PT EVAL LOW COMPLEX 20 MIN: CPT | Mod: GP | Performed by: PHYSICAL THERAPIST

## 2024-12-02 ASSESSMENT — ACTIVITIES OF DAILY LIVING (ADL)
PLACING_AN_OBJECT_ON_A_HIGH_SHELF: 6
WASHING_YOUR_BACK: 4
WASHING_YOUR_HAIR?: 2
TOUCHING_THE_BACK_OF_YOUR_NECK: 2
REACHING_FOR_SOMETHING_ON_A_HIGH_SHELF: 4
WHEN_LYING_ON_THE_INVOLVED_SIDE: 3
PLEASE_INDICATE_YOR_PRIMARY_REASON_FOR_REFERRAL_TO_THERAPY:: SHOULDER
CARRYING_A_HEAVY_OBJECT_OF_10_POUNDS: 9
PUTTING_ON_YOUR_PANTS: 1
AT_ITS_WORST?: 8
PUSHING_WITH_THE_INVOLVED_ARM: 6
PUTTING_ON_A_SHIRT_THAT_BUTTONS_DOWN_THE_FRONT: 3
PUTTING_ON_AN_UNDERSHIRT_OR_A_PULLOVER_SWEATER: 4

## 2024-12-02 NOTE — PROGRESS NOTES
PHYSICAL THERAPY EVALUATION  Type of Visit: Evaluation       Fall Risk Screen:  Fall screen completed by: PT  Have you fallen 2 or more times in the past year?: No  Have you fallen and had an injury in the past year?: No  Is patient a fall risk?: No    Subjective   Long history of R shoulder pain that has been worse over the past year for unknown reasons. Worse lying on R side, lifting groceries, mopping, dressing, hair ADL's. In the evening she can have 8-9/10 pain while sitting. Hx of carpal tunnel and has tingling in R hand.        Presenting condition or subjective complaint: (Patient-Rptd) right shoulder pain  Date of onset: 11/22/24 (MD order date)    Relevant medical history: (Patient-Rptd) Arthritis; Bladder or bowel problems   Dates & types of surgery: (Patient-Rptd) numerous   Past Surgical History:   Procedure Laterality Date    ABDOMEN SURGERY  9/2009    gastric bypass 09/2009    APPENDECTOMY  1970    1970    ARTHROPLASTY HIP Right 2/2/2021    Procedure: Right total hip arthroplasty ([employer] 52 mm acetabulum;; #6 Anthology; +0 neck 32 mm ceramic head);  Surgeon: Robbie Gamble MD;  Location:  OR    ARTHROPLASTY REVISION KNEE Right 2/4/2016    Procedure: ARTHROPLASTY REVISION KNEE;  Surgeon: Vincent Allen MD;  Location:  OR    COLONOSCOPY  7/14/2014    Procedure: COLONOSCOPY;  Surgeon: Jamari Jj MD;  Location:  GI    ENT SURGERY  1947    tosillectomy     EYE SURGERY  2011    macular repair 2011 left eye    EYE SURGERY  2012    cataract removal left eye.    GYN SURGERY  1978    c section    HERNIA REPAIR  1990    umbilical 1990    ORTHOPEDIC SURGERY  1993, 1995    arthroscopic both knees    ORTHOPEDIC SURGERY  1996    Bilateral TKA    ORTHOPEDIC SURGERY  2008    Left hip replacement.    SOFT TISSUE SURGERY  2019    basal cell carcinoma removed from face, several spots         Prior diagnostic imaging/testing results: (Patient-Rptd) X-ray     Prior therapy history for the  same diagnosis, illness or injury:        Prior Level of Function  Transfers: Independent  Ambulation: Independent  ADL: Independent  IADL:     Living Environment  Social support: (Patient-Rptd) With a significant other or spouse   Type of home: (Patient-Rptd) [facility]; 2-story; Basement   Stairs to enter the home: (Patient-Rptd) No       Ramp: (Patient-Rptd) No   Stairs inside the home: (Patient-Rptd) Yes (Patient-Rptd) 16 Is there a railing: (Patient-Rptd) Yes     Help at home: (Patient-Rptd) None  Equipment owned:       Employment: (Patient-Rptd) No    Hobbies/Interests: (Patient-Rptd) piano reading choral groups crossword puzzles    Patient goals for therapy: (Patient-Rptd) sleep vacuum carry things up or down stairs    Pain assessment: See objective evaluation for additional pain details     Objective   SHOULDER EVALUATION  PAIN: Pain Level at Rest: 0-9/10  Pain Level with Use: 3-9/10/10  Pain Location: R anterior and lateral shoulder  Pain Quality: Aching  Pain Frequency: intermittent  Pain is Worst: daytime  Pain is Exacerbated By: lying on R side, lifting groceries, mopping, dressing, hair ADL's  Pain is Relieved By: rest and pain med patch, tylenol  Pain Progression: Unchanged    POSTURE: Sitting Posture: Rounded shoulders, Forward head, Lordosis decreased    ROM:   (Degrees) Left AROM Left PROM Right AROM  Right PROM   Shoulder Flexion 112  99    Shoulder Extension       Shoulder Abduction 122  79 (+)    Shoulder Adduction       Shoulder Internal Rotation T8  R sacrum (+)    Shoulder External Rotation       Shoulder Horizontal Abduction       Shoulder Horizontal Adduction       Shoulder Flexion ER       Shoulder Flexion IR       Elbow Extension       Elbow Flexion       Pain:   End feel:     STRENGTH:   Pain: - none + mild ++ moderate +++ severe  Strength Scale: 0-5/5 Left Right   Shoulder Flexion 5 4+, + (mild)   Shoulder Extension     Shoulder Abduction 5 4+, + (mild)   Shoulder Adduction     Shoulder  Internal Rotation 5 5   Shoulder External Rotation 5 4, ++ (mod)   Shoulder Horizontal Abduction     Shoulder Horizontal Adduction     Elbow Flexion 5 5   Elbow Extension 5 5   Mid Trap     Lower Trap     Rhomboid     Serratus Anterior         SPECIAL TESTS:  (+) Lynn, (+) Neer's, (+) cross arm  PALPATION:  TTP to bicipital groove  JOINT MOBILITY:  WFL, minor hypomobility R compared with L  CERVICAL SCREEN:  flex=100%, ext=50%, RR=90%, LR=90%, RSB=33%, LGZ=883%    Assessment & Plan   CLINICAL IMPRESSIONS  Medical Diagnosis: Rotator cuff impingement syndrome of right shoulder  Arthritis of right acromioclavicular joint  Arthritis of right glenohumeral joint  Chronic right shoulder pain    Treatment Diagnosis: R shoulder pain   Impression/Assessment: Patient is a 84 year old female with R shoulder complaints.  The following significant findings have been identified: Pain, Decreased ROM/flexibility, Decreased joint mobility, Decreased strength, Decreased activity tolerance, and Impaired posture. These impairments interfere with their ability to perform self care tasks, recreational activities, household chores, driving , and household mobility as compared to previous level of function.     Clinical Decision Making (Complexity):  Clinical Presentation: Stable/Uncomplicated  Clinical Presentation Rationale: based on medical and personal factors listed in PT evaluation  Clinical Decision Making (Complexity): Low complexity    PLAN OF CARE  Treatment Interventions:  Interventions: Manual Therapy, Neuromuscular Re-education, Therapeutic Activity, Therapeutic Exercise, Self-Care/Home Management    Long Term Goals     PT Goal 1  Goal Identifier: reaching  Goal Description: Patient able to reach overhead to 115 flexion with 0/10 pain.  Rationale: to maximize safety and independence with performance of ADLs and functional tasks;to maximize safety and independence within the home;to maximize safety and independence with self  cares  Goal Progress: new goal  Target Date: 02/03/25      Frequency of Treatment: 1x/week x 3 weeks, tapering to 2x/month x 6 wks  Duration of Treatment: 9 weeks    Recommended Referrals to Other Professionals:   Education Assessment:   Learner/Method: Patient;No Barriers to Learning    Risks and benefits of evaluation/treatment have been explained.   Patient/Family/caregiver agrees with Plan of Care.     Evaluation Time:     PT Eval, Low Complexity Minutes (46317): 18     Signing Clinician: GLADYS Montanez [facility]                                                                                   OUTPATIENT PHYSICAL THERAPY      PLAN OF TREATMENT FOR OUTPATIENT REHABILITATION   Patient's Last Name, First Name, GUERDAShwetaMARCIALShweta  Aurora Figueroa  BEL YOB: 1940   Provider's Name   [facility]   Medical Record No.  [id number]     Onset Date: 11/22/24 (MD order date)  Start of Care Date: 12/02/24     Medical Diagnosis:  Rotator cuff impingement syndrome of right shoulder  Arthritis of right acromioclavicular joint  Arthritis of right glenohumeral joint  Chronic right shoulder pain      PT Treatment Diagnosis:  R shoulder pain Plan of Treatment  Frequency/Duration: 1x/week x 3 weeks, tapering to 2x/month x 6 wks/ 9 weeks    Certification date from 12/02/24 to 02/03/25         See note for plan of treatment details and functional goals     Nataly Chau, GLADYS                         I CERTIFY THE NEED FOR THESE SERVICES FURNISHED UNDER        THIS PLAN OF TREATMENT AND WHILE UNDER MY CARE     (Physician attestation of this document indicates review and certification of the therapy plan).              Referring Provider:  An Mendoza    Initial Assessment  See Epic Evaluation- Start of Care Date: 12/02/24

## 2024-12-09 ENCOUNTER — THERAPY VISIT (OUTPATIENT)
Dept: PHYSICAL THERAPY | Facility: CLINIC | Age: 84
End: 2024-12-09
Attending: STUDENT IN AN ORGANIZED HEALTH CARE EDUCATION/TRAINING PROGRAM
Payer: MEDICARE

## 2024-12-09 DIAGNOSIS — M25.511 CHRONIC RIGHT SHOULDER PAIN: Primary | ICD-10-CM

## 2024-12-09 DIAGNOSIS — G89.29 CHRONIC RIGHT SHOULDER PAIN: Primary | ICD-10-CM

## 2024-12-09 PROCEDURE — 97110 THERAPEUTIC EXERCISES: CPT | Mod: GP | Performed by: PHYSICAL THERAPIST

## 2024-12-09 PROCEDURE — 97112 NEUROMUSCULAR REEDUCATION: CPT | Mod: GP | Performed by: PHYSICAL THERAPIST

## 2024-12-16 ENCOUNTER — THERAPY VISIT (OUTPATIENT)
Dept: PHYSICAL THERAPY | Facility: CLINIC | Age: 84
End: 2024-12-16
Attending: STUDENT IN AN ORGANIZED HEALTH CARE EDUCATION/TRAINING PROGRAM
Payer: MEDICARE

## 2024-12-16 DIAGNOSIS — M25.511 CHRONIC RIGHT SHOULDER PAIN: Primary | ICD-10-CM

## 2024-12-16 DIAGNOSIS — G89.29 CHRONIC RIGHT SHOULDER PAIN: Primary | ICD-10-CM

## 2024-12-16 PROCEDURE — 97110 THERAPEUTIC EXERCISES: CPT | Mod: GP | Performed by: PHYSICAL THERAPIST

## 2024-12-23 ENCOUNTER — THERAPY VISIT (OUTPATIENT)
Dept: PHYSICAL THERAPY | Facility: CLINIC | Age: 84
End: 2024-12-23
Payer: MEDICARE

## 2024-12-23 DIAGNOSIS — M25.511 CHRONIC RIGHT SHOULDER PAIN: Primary | ICD-10-CM

## 2024-12-23 DIAGNOSIS — G89.29 CHRONIC RIGHT SHOULDER PAIN: Primary | ICD-10-CM

## 2024-12-23 PROCEDURE — 97110 THERAPEUTIC EXERCISES: CPT | Mod: GP | Performed by: PHYSICAL THERAPIST

## 2025-01-06 ENCOUNTER — OFFICE VISIT (OUTPATIENT)
Dept: ORTHOPEDICS | Facility: CLINIC | Age: 85
End: 2025-01-06
Attending: STUDENT IN AN ORGANIZED HEALTH CARE EDUCATION/TRAINING PROGRAM
Payer: MEDICARE

## 2025-01-06 VITALS — WEIGHT: 180 LBS | BODY MASS INDEX: 29.99 KG/M2 | HEIGHT: 65 IN

## 2025-01-06 DIAGNOSIS — M25.511 CHRONIC RIGHT SHOULDER PAIN: ICD-10-CM

## 2025-01-06 DIAGNOSIS — M19.011 ARTHRITIS OF RIGHT GLENOHUMERAL JOINT: ICD-10-CM

## 2025-01-06 DIAGNOSIS — M75.41 ROTATOR CUFF IMPINGEMENT SYNDROME OF RIGHT SHOULDER: Primary | ICD-10-CM

## 2025-01-06 DIAGNOSIS — M19.011 ARTHRITIS OF RIGHT ACROMIOCLAVICULAR JOINT: ICD-10-CM

## 2025-01-06 DIAGNOSIS — G89.29 CHRONIC RIGHT SHOULDER PAIN: ICD-10-CM

## 2025-01-06 PROCEDURE — 99213 OFFICE O/P EST LOW 20 MIN: CPT | Performed by: STUDENT IN AN ORGANIZED HEALTH CARE EDUCATION/TRAINING PROGRAM

## 2025-01-06 ASSESSMENT — PAIN SCALES - GENERAL: PAINLEVEL_OUTOF10: NO PAIN (1)

## 2025-01-06 NOTE — PROGRESS NOTES
SPORTS MEDICINE CLINIC FOLLOW-UP PATIENT VISIT    HISTORY OF PRESENT ILLNESS  Aurora Figueroa is a very pleasant 84 year old right-hand-dominant female who is presenting today for follow-up right shoulder pain and functional limitations particularly with overhead activities that seems related to supraspinatus impingement with acromioclavicular and glenohumeral arthritis.    Previous Visit (11/22/2024)   At last visit, radiographs were reviewed, physical therapy referral placed, Lidoderm patch prescriptions sent to pharmacy.    Today (1/6/2025)  Since last visit, she completed 4 sessions of physical therapy which have been helping. Taking extra strength tylenol PRN. The shoulder is feeling much better, she reports that she is 90% back to where she was before    Previous Treatments:  -Medication: voltaren gel- skin allergy to this. Icy hot cream  -Rehabilitation: tried in 2023- helped Left > Right   -Durable Medical Equipment:X  -Injections: X  -Modalities: heat  -Other Providers seen: Dr Anne in [facility] 4/19/23    Sports, Hobbies, Employment:  - likes shopping and cleaning  - plays piano- this is mostly tolerable.     Average hours of sleep per night: 6 hours, she denies any more difficulty falling asleep    Average minutes of exercise per day: Daily 15 mins or less.     Additional Information of consideration:  -Numbness/paresthesias in extremities? Yes, feels aggravation of carpal tunnel syndrome when shoulder is sore    MEDICATIONS    Current Outpatient Medications:     Cholecalciferol (VITAMIN D) 1000 UNITS capsule, Take 1 capsule by mouth daily , Disp: , Rfl:     Cyanocobalamin (VITAMIN B 12 PO), Take 2,500 mcg by mouth daily sublingual, Disp: , Rfl:     ferrous sulfate (FEROSUL) 325 (65 Fe) MG tablet, Take 325 mg by mouth twice a week , Disp: , Rfl:     levothyroxine (SYNTHROID/LEVOTHROID) 50 MCG tablet, TAKE 1 TABLET BY MOUTH EVERY DAY, Disp: 90 tablet, Rfl: 3    lidocaine (LIDODERM) 5 %  patch, Place 1 patch over 12 hours onto the skin every 24 hours. To prevent lidocaine toxicity, patient should be patch free for 12 hrs daily., Disp: 30 patch, Rfl: 3    methylcellulose (CITRUCEL) powder, Take 3 teaspoonful by mouth daily, Disp: , Rfl:     metoprolol succinate ER (TOPROL XL) 25 MG 24 hr tablet, Take 1 tablet (25 mg) by mouth daily, Disp: 90 tablet, Rfl: 3    Multiple Vitamins-Minerals (ICAPS AREDS 2 PO), Take by mouth 2 times daily , Disp: , Rfl:     OMEPRAZOLE PO, Take 20 mg by mouth daily , Disp: , Rfl:     Pediatric Multivit-Minerals-C (FLINTSTONES COMPLETE PO), Take 1 tablet by mouth daily , Disp: , Rfl:     simvastatin (ZOCOR) 40 MG tablet, Take 1 tablet (40 mg) by mouth daily, Disp: 90 tablet, Rfl: 3    ALLERGIES  Allergies   Allergen Reactions    Food Itching     Raw fruit & veg:  Apples, cherries, cantalope, tomatoes,  carrotts.  Eye & throat irritations.    Penicillins Hives    Fruit [[place]]      Hives, itchy throat tomatoes    Nsaids Other (See Comments)     Patient had gastric bypass surgery.  Should not take oral  NSAID's ever.    Animal Dander      Pruritis,itchi eyes, throat and ears.    Erythromycin GI Disturbance    Erythromycin Ethylsuccinate GI Disturbance    Pollen Extract Itching       PAST MEDICAL HISTORY  Past Medical History:   Diagnosis Date    Arrhythmia     tachycardia intermittant (metoprolol)    Basal cell carcinoma     Gastroesophageal reflux disease     Hyperlipidemia     Hypertension     Hypothyroidism     LUMBOSACRAL NEURITIS NOS 4/12/2007    Pemphigus (H) 2015    Sleep apnea     does not wear CPAP (resolved after gastric bypass surgery)    Squamous cell carcinoma     Tachycardia        PAST SURGICAL HISTORY  Past Surgical History:   Procedure Laterality Date    ABDOMEN SURGERY  9/2009    gastric bypass 09/2009    APPENDECTOMY  1970    1970    ARTHROPLASTY HIP Right 2/2/2021    Procedure: Right total hip arthroplasty (Niño and Nephew 52 mm acetabulum;; #6 Anthology;  +0 neck 32 mm ceramic head);  Surgeon: Robbie Gamble MD;  Location: [facility] OR    ARTHROPLASTY REVISION KNEE Right 2/4/2016    Procedure: ARTHROPLASTY REVISION KNEE;  Surgeon: Vincent Allen MD;  Location:  OR    COLONOSCOPY  7/14/2014    Procedure: COLONOSCOPY;  Surgeon: Jamari Jj MD;  Location:  GI    ENT SURGERY  1947    tosillectomy     EYE SURGERY  2011    macular repair 2011 left eye    EYE SURGERY  2012    cataract removal left eye.    GYN SURGERY  1978    c section    HERNIA REPAIR  1990    umbilical 1990    ORTHOPEDIC SURGERY  1993, 1995    arthroscopic both knees    ORTHOPEDIC SURGERY  1996    Bilateral TKA    ORTHOPEDIC SURGERY  2008    Left hip replacement.    SOFT TISSUE SURGERY  2019    basal cell carcinoma removed from face, several spots       SOCIAL HISTORY  Social History     Tobacco Use    Smoking status: Never    Smokeless tobacco: Never   Substance Use Topics    Alcohol use: Yes     Alcohol/week: 0.0 standard drinks of alcohol     Comment: rare    Drug use: No        FAMILY HISTORY  Family History   Problem Relation Age of Onset    Heart Disease Mother     Skin Cancer Mother     Unknown/Adopted Father     Heart Disease Sister     Skin Cancer Sister     Skin Cancer Brother     Heart Disease Son     Diabetes No family hx of     Coronary Artery Disease No family hx of     Hypertension No family hx of     Hyperlipidemia No family hx of     Cerebrovascular Disease No family hx of     Breast Cancer No family hx of     Colon Cancer No family hx of     Prostate Cancer No family hx of     Other Cancer No family hx of     Depression No family hx of     Anxiety Disorder No family hx of     Mental Illness No family hx of     Substance Abuse No family hx of     Anesthesia Reaction No family hx of     Asthma No family hx of     Osteoporosis No family hx of     Genetic Disorder No family hx of     Thyroid Disease No family hx of     Obesity No family hx of        REVIEW OF SYSTEMS  Complete 12  "system Review of Systems performed and was negative except for HPI.    VITALS  Vitals:    01/06/25 1008   Weight: 81.6 kg (180 lb)   Height: 1.651 m (5' 5\")       PHYSICAL EXAMINATION   General: Age appropriate appearing, no acute distress  HEENT: normocephalic, atraumatic, sclera non-icteric  Skin: No open skin lesion noted in visible areas.  Respiratory: Non labored breathing, No wheezes.  Cardiac/Vessels: No edema, cyanosis, clubbing noted in all extremities.  Lymph: No palpable lymph node swelling noted around the affected area.  Mental: There was no signs of aberrant behaviors noted. Patient was pleasant throughout the encounter.    Functional Movements: Non-antalgic gait appreciated.      RIGHT SHOULDER:   Inspection: No notable asymmetry appreciated upon exam bilaterally  Palpation: Mild TTP right bicipital groove, no TTP right AC joint anymore  Range of Motion (Estimated, Active unless otherwise noted, L/R):   Flexion (normal = 170-180 degrees): 170/170  Abduction (normal = 170-180 degrees): 170/170*  External Rotation (normal = 90 degrees): 60/60  Internal Rotation: Reaching hands behind back, with right thumb reaches T7* & left thumb reached T4    Special Testing:   Empty Can (+)    IMAGING STUDIES:  11/22/2024 Right shoulder radiographs: glenohumeral and acromioclavicular joint degenerative joint disease.  Official radiology read pending.    4/19/2023 Bilateral shoulder radiographs: \"Degenerative change of the glenohumeral and acromioclavicular joints bilaterally. No evidence for fracture or dislocation on either side. There is some undersurface irregularity to the distal acromion bilaterally which could predispose to impingement.\"  I agree with the radiology report.    I personally reviewed these images and shared the findings with the patient.    IMPRESSION  Aurora Figueroa is a very pleasant 84 year old right-hand-dominant female who is presenting today for follow-up of right shoulder pain and " functional limitations particularly with overhead activities that seems related to supraspinatus impingement with acromioclavicular and glenohumeral arthritis.  She reports significant improvement in pain and symptoms since starting physical therapy.    PLAN  The following was discussed with the patient:  Activity Status Recommended:Activity as tolerated  Imaging ordered: None at this time  Rehabilitation: Continue physical therapy  Bracing/Orthotics: None recommended  US guided injections: None recommended, could consider corticosteroid injection in the future if symptoms persist  Referrals: None recommended  Medications: No changes  Follow up: As needed  Resources Provided: Written and verbal information detailing above findings and plan provided including after visit summary.    They were encouraged to message me on [employer] whenever they needed.    The patient was in agreement with this plan. All questions were answered to the best of my ability.    Total time (face-to-face and non-face-to-face) spent on today's visit was 15 minutes. This included preparation for the visit (i.e. reviewing test results), performance of a medically  appropriate history and examination, placing orders for medications, tests or other procedures, and discussing the plan of care with the patient. This time is exclusive of procedures performed and time spent teaching.    An Mendoza MD, [facility]  Sports Medicine Attending Physician  Department of Physical Medicine & Rehabilitation

## 2025-01-06 NOTE — LETTER
1/6/2025      Aurora Figueroa  [address] [address]      Dear Colleague,    Thank you for referring your patient, Aurora Figueroa, to the [facility] [place]. Please see a copy of my visit note below.    SPORTS MEDICINE CLINIC FOLLOW-UP PATIENT VISIT    HISTORY OF PRESENT ILLNESS  Aurora Figueroa is a very pleasant 84 year old right-hand-dominant female who is presenting today for follow-up right shoulder pain and functional limitations particularly with overhead activities that seems related to supraspinatus impingement with acromioclavicular and glenohumeral arthritis.    Previous Visit (11/22/2024)   At last visit, radiographs were reviewed, physical therapy referral placed, Lidoderm patch prescriptions sent to pharmacy.    Today (1/6/2025)  Since last visit, she completed 4 sessions of physical therapy which have been helping. Taking extra strength tylenol PRN. The shoulder is feeling much better, she reports that she is 90% back to where she was before    Previous Treatments:  -Medication: voltaren gel- skin allergy to this. Icy hot cream  -Rehabilitation: tried in 2023- helped Left > Right   -Durable Medical Equipment:X  -Injections: X  -Modalities: heat  -Other Providers seen: Dr Anne in [facility] 4/19/23    Sports, Hobbies, Employment:  - likes shopping and cleaning  - plays piano- this is mostly tolerable.     Average hours of sleep per night: 6 hours, she denies any more difficulty falling asleep    Average minutes of exercise per day: Daily 15 mins or less.     Additional Information of consideration:  -Numbness/paresthesias in extremities? Yes, feels aggravation of carpal tunnel syndrome when shoulder is sore    MEDICATIONS    Current Outpatient Medications:      Cholecalciferol (VITAMIN D) 1000 UNITS capsule, Take 1 capsule by mouth daily , Disp: , Rfl:      Cyanocobalamin (VITAMIN B 12 PO), Take 2,500 mcg by mouth daily  sublingual, Disp: , Rfl:      ferrous sulfate (FEROSUL) 325 (65 Fe) MG tablet, Take 325 mg by mouth twice a week , Disp: , Rfl:      levothyroxine (SYNTHROID/LEVOTHROID) 50 MCG tablet, TAKE 1 TABLET BY MOUTH EVERY DAY, Disp: 90 tablet, Rfl: 3     lidocaine (LIDODERM) 5 % patch, Place 1 patch over 12 hours onto the skin every 24 hours. To prevent lidocaine toxicity, patient should be patch free for 12 hrs daily., Disp: 30 patch, Rfl: 3     methylcellulose (CITRUCEL) powder, Take 3 teaspoonful by mouth daily, Disp: , Rfl:      metoprolol succinate ER (TOPROL XL) 25 MG 24 hr tablet, Take 1 tablet (25 mg) by mouth daily, Disp: 90 tablet, Rfl: 3     Multiple Vitamins-Minerals (ICAPS AREDS 2 PO), Take by mouth 2 times daily , Disp: , Rfl:      OMEPRAZOLE PO, Take 20 mg by mouth daily , Disp: , Rfl:      Pediatric Multivit-Minerals-C (FLINTSTONES COMPLETE PO), Take 1 tablet by mouth daily , Disp: , Rfl:      simvastatin (ZOCOR) 40 MG tablet, Take 1 tablet (40 mg) by mouth daily, Disp: 90 tablet, Rfl: 3    ALLERGIES  Allergies   Allergen Reactions     Food Itching     Raw fruit & veg:  Apples, cherries, cantalope, tomatoes,  carrotts.  Eye & throat irritations.     Penicillins Hives     Fruit [Cherry]      Hives, itchy throat tomatoes     Nsaids Other (See Comments)     Patient had gastric bypass surgery.  Should not take oral  NSAID's ever.     Animal Dander      Pruritis,itchi eyes, throat and ears.     Erythromycin GI Disturbance     Erythromycin Ethylsuccinate GI Disturbance     Pollen Extract Itching       PAST MEDICAL HISTORY  Past Medical History:   Diagnosis Date     Arrhythmia     tachycardia intermittant (metoprolol)     Basal cell carcinoma      Gastroesophageal reflux disease      Hyperlipidemia      Hypertension      Hypothyroidism      LUMBOSACRAL NEURITIS NOS 4/12/2007     Pemphigus (H) 2015     Sleep apnea     does not wear CPAP (resolved after gastric bypass surgery)     Squamous cell carcinoma       Tachycardia        PAST SURGICAL HISTORY  Past Surgical History:   Procedure Laterality Date     ABDOMEN SURGERY  9/2009    gastric bypass 09/2009     APPENDECTOMY  1970    1970     ARTHROPLASTY HIP Right 2/2/2021    Procedure: Right total hip arthroplasty (Niño and Nephew 52 mm acetabulum;; #6 Anthology; +0 neck 32 mm ceramic head);  Surgeon: Robbie Gamble MD;  Location: [facility] OR     ARTHROPLASTY REVISION KNEE Right 2/4/2016    Procedure: ARTHROPLASTY REVISION KNEE;  Surgeon: Vincent Allen MD;  Location:  OR     COLONOSCOPY  7/14/2014    Procedure: COLONOSCOPY;  Surgeon: Jamari Jj MD;  Location:  GI     ENT SURGERY  1947    tosillectomy      EYE SURGERY  2011    macular repair 2011 left eye     EYE SURGERY  2012    cataract removal left eye.     GYN SURGERY  1978    c section     HERNIA REPAIR  1990    umbilical 1990     ORTHOPEDIC SURGERY  1993, 1995    arthroscopic both knees     ORTHOPEDIC SURGERY  1996    Bilateral TKA     ORTHOPEDIC SURGERY  2008    Left hip replacement.     SOFT TISSUE SURGERY  2019    basal cell carcinoma removed from face, several spots       SOCIAL HISTORY  Social History     Tobacco Use     Smoking status: Never     Smokeless tobacco: Never   Substance Use Topics     Alcohol use: Yes     Alcohol/week: 0.0 standard drinks of alcohol     Comment: rare     Drug use: No        FAMILY HISTORY  Family History   Problem Relation Age of Onset     Heart Disease Mother      Skin Cancer Mother      Unknown/Adopted Father      Heart Disease Sister      Skin Cancer Sister      Skin Cancer Brother      Heart Disease Son      Diabetes No family hx of      Coronary Artery Disease No family hx of      Hypertension No family hx of      Hyperlipidemia No family hx of      Cerebrovascular Disease No family hx of      Breast Cancer No family hx of      Colon Cancer No family hx of      Prostate Cancer No family hx of      Other Cancer No family hx of      Depression No family hx of       "Anxiety Disorder No family hx of      Mental Illness No family hx of      Substance Abuse No family hx of      Anesthesia Reaction No family hx of      Asthma No family hx of      Osteoporosis No family hx of      Genetic Disorder No family hx of      Thyroid Disease No family hx of      Obesity No family hx of        REVIEW OF SYSTEMS  Complete 12 system Review of Systems performed and was negative except for HPI.    VITALS  Vitals:    01/06/25 1008   Weight: 81.6 kg (180 lb)   Height: 1.651 m (5' 5\")       PHYSICAL EXAMINATION   General: Age appropriate appearing, no acute distress  HEENT: normocephalic, atraumatic, sclera non-icteric  Skin: No open skin lesion noted in visible areas.  Respiratory: Non labored breathing, No wheezes.  Cardiac/Vessels: No edema, cyanosis, clubbing noted in all extremities.  Lymph: No palpable lymph node swelling noted around the affected area.  Mental: There was no signs of aberrant behaviors noted. Patient was pleasant throughout the encounter.    Functional Movements: Non-antalgic gait appreciated.      RIGHT SHOULDER:   Inspection: No notable asymmetry appreciated upon exam bilaterally  Palpation: Mild TTP right bicipital groove, no TTP right AC joint anymore  Range of Motion (Estimated, Active unless otherwise noted, L/R):   Flexion (normal = 170-180 degrees): 170/170  Abduction (normal = 170-180 degrees): 170/170*  External Rotation (normal = 90 degrees): 60/60  Internal Rotation: Reaching hands behind back, with right thumb reaches T7* & left thumb reached T4    Special Testing:   Empty Can (+)    IMAGING STUDIES:  11/22/2024 Right shoulder radiographs: glenohumeral and acromioclavicular joint degenerative joint disease.  Official radiology read pending.    4/19/2023 Bilateral shoulder radiographs: \"Degenerative change of the glenohumeral and acromioclavicular joints bilaterally. No evidence for fracture or dislocation on either side. There is some undersurface irregularity to " "the distal acromion bilaterally which could predispose to impingement.\"  I agree with the radiology report.    I personally reviewed these images and shared the findings with the patient.    IMPRESSION  Aurora Figueroa is a very pleasant 84 year old right-hand-dominant female who is presenting today for follow-up of right shoulder pain and functional limitations particularly with overhead activities that seems related to supraspinatus impingement with acromioclavicular and glenohumeral arthritis.  She reports significant improvement in pain and symptoms since starting physical therapy.    PLAN  The following was discussed with the patient:  Activity Status Recommended:Activity as tolerated  Imaging ordered: None at this time  Rehabilitation: Continue physical therapy  Bracing/Orthotics: None recommended  US guided injections: None recommended, could consider corticosteroid injection in the future if symptoms persist  Referrals: None recommended  Medications: No changes  Follow up: As needed  Resources Provided: Written and verbal information detailing above findings and plan provided including after visit summary.    They were encouraged to message me on [employer] whenever they needed.    The patient was in agreement with this plan. All questions were answered to the best of my ability.    Total time (face-to-face and non-face-to-face) spent on today's visit was 15 minutes. This included preparation for the visit (i.e. reviewing test results), performance of a medically  appropriate history and examination, placing orders for medications, tests or other procedures, and discussing the plan of care with the patient. This time is exclusive of procedures performed and time spent teaching.    An Mendoza MD, [facility]  Sports Medicine Attending Physician  Department of Physical Medicine & Rehabilitation       Again, thank you for allowing me to participate in the care of your patient.  "       Sincerely,        An Mendoza MD    Electronically signed

## 2025-01-06 NOTE — PATIENT INSTRUCTIONS
Aurora Figueroa, It was nice to see you in our office today.      DIAGNOSIS:   1. Rotator cuff impingement syndrome of right shoulder    2. Arthritis of right acromioclavicular joint    3. Arthritis of right glenohumeral joint    4. Chronic right shoulder pain      INSTRUCTIONS FOR FOLLOW-UP CARE:  Activity Status Recommended:Activity as tolerated  Imaging ordered: None at this time  Rehabilitation: Continue physical therapy  Bracing/Orthotics: None recommended  US guided injections: None recommended, could consider corticosteroid injection in the future if symptoms persist  Referrals: None recommended  Medications: No changes  Follow up: As needed    CLINIC LOCATIONS:     [facility]:  [phone] [address], [address] [place] HELP LINE: [phone]   [place] [address] TRIAGE LINE: [phone]   (Monday & Friday) APPOINTMENTS: [phone]    RADIOLOGY: [phone]   [facility] [phone] [address] PHYSICAL & OCCUPATIONAL THERAPY: [phone]   [place] [address] BILLING QUESTIONS: [phone]   (Tuesday & Thursday) FAX: [phone]           Thank you for choosing [facility]!    If you have any questions, please do not hesitate to reach out on [employer] [facility] or Call [phone] and ask for my team.    An Mendoza MD, [facility] Orthopedics and Sports Medicine

## 2025-01-08 ENCOUNTER — THERAPY VISIT (OUTPATIENT)
Dept: PHYSICAL THERAPY | Facility: CLINIC | Age: 85
End: 2025-01-08
Payer: MEDICARE

## 2025-01-08 DIAGNOSIS — G89.29 CHRONIC RIGHT SHOULDER PAIN: Primary | ICD-10-CM

## 2025-01-08 DIAGNOSIS — M25.511 CHRONIC RIGHT SHOULDER PAIN: Primary | ICD-10-CM

## 2025-01-08 PROCEDURE — 97110 THERAPEUTIC EXERCISES: CPT | Mod: GP | Performed by: PHYSICAL THERAPIST

## 2025-01-27 ENCOUNTER — THERAPY VISIT (OUTPATIENT)
Dept: PHYSICAL THERAPY | Facility: CLINIC | Age: 85
End: 2025-01-27
Payer: MEDICARE

## 2025-01-27 DIAGNOSIS — G89.29 CHRONIC RIGHT SHOULDER PAIN: Primary | ICD-10-CM

## 2025-01-27 DIAGNOSIS — M25.511 CHRONIC RIGHT SHOULDER PAIN: Primary | ICD-10-CM

## 2025-01-27 PROCEDURE — 97110 THERAPEUTIC EXERCISES: CPT | Mod: GP | Performed by: PHYSICAL THERAPIST

## 2025-01-27 NOTE — PROGRESS NOTES
DISCHARGE  Reason for Discharge: Patient has met all goals.    Equipment Issued: theraband    Discharge Plan: Patient to continue home program.    Referring Provider:  An Mendoza       01/27/25 0500   Appointment Info   Signing clinician's name / credentials Nataly Chau PT   Total/Authorized Visits Plan 6   Visits Used 6   Medical Diagnosis Rotator cuff impingement syndrome of right shoulder  Arthritis of right acromioclavicular joint  Arthritis of right glenohumeral joint  Chronic right shoulder pain   PT Tx Diagnosis R shoulder pain   Progress Note/Certification   Start of Care Date 12/02/24   Onset of illness/injury or Date of Surgery 11/22/24  (MD order date)   Therapy Frequency 1x/week x 3 weeks, tapering to 2x/month x 6 wks   Predicted Duration 9 weeks   Certification date from 12/02/24   Certification date to 02/03/25   Progress Note Due Date 01/30/25   Progress Note Completed Date 12/02/24   PT Goal 1   Goal Identifier reaching   Goal Description Patient able to reach overhead to 115 flexion with 0/10 pain.   Rationale to maximize safety and independence with performance of ADLs and functional tasks;to maximize safety and independence within the home;to maximize safety and independence with self cares   Goal Progress Reaches to 132 degrees with 0/10 pain.   Target Date 02/03/25   Date Met 01/27/25   Subjective Report   Subjective Report Doing very well. Can reach OH farther and without pain. Lifting OH still bothers, but improving. HEP goes well and she feels ready to continue on her own.   Objective Measure 1   Objective Measure Shoulder AROM   Details R flex=132 (improved from 99), abd=131 (improved from 79), ext=T9 (improved from sacrum)   Objective Measure 2   Objective Measure MMT   Details flex=5/5, abd=5/5 (no pain), IR=5/5, ER=5-/5 (no pain)   Objective Measure 3   Objective Measure CAROM   Details flex=100%, ext=100% (no pain), JQ=194%, UZ=831%, RSB=90%, KTC=548%   Therapeutic  Procedure/Exercise   Therapeutic Procedures: strength, endurance, ROM, flexibility minutes (05866) 25   PTRx Ther Proc 1 Cervical Retraction   PTRx Ther Proc 1 - Details x10 good form 3x/day   PTRx Ther Proc 2 Shoulder Scapular Retraction with Tubing   PTRx Ther Proc 2 - Details RTB x 20 every other day   PTRx Ther Proc 3 Shoulder Scaption Full Can   PTRx Ther Proc 3 - Details 6 oz x30 every other day   PTRx Ther Proc 4 Shoulder Extension in Standing   PTRx Ther Proc 4 - Details 6 oz x30 every other day   PTRx Ther Proc 5 Shoulder Horizontal Abduction Standing   PTRx Ther Proc 5 - Details 6 oz x30 every other day   PTRx Ther Proc 6 Push-Up Plus At Counter   PTRx Ther Proc 6 - Details x20 every other day   PTRx Ther Proc 7 Elbow Strengthening Isotonic Flexion   PTRx Ther Proc 7 - Details 1# x 30 every other day   Skilled Intervention vc, vsc for form   Patient Response/Progress good form, min cuing required   Neuromuscular Re-education   PTRx Neuro Re-ed 1 Posture Correction with Lumbar Roll   PTRx Neuro Re-ed 1 - Details reviewed   Education   Learner/Method Patient;No Barriers to Learning   Plan   Home program PTRX HEP   Updates to plan of care d/c to HEP   Total Session Time   Timed Code Treatment Minutes 25   Total Treatment Time (sum of timed and untimed services) 25

## 2025-02-25 NOTE — PROGRESS NOTES
SUBJECTIVE:        I spent a total of 25 minutes in the care of Aurora on the day of service including 20 minutes of face-to-face time with remainder in chart review, care coordination, documentation on the day of service.                                               Aurora Figueroa is a 85 year old female who presents to clinic today for the following health issue(s):  post chacho vaginal bleeding  No chief complaint on file.    Aurora is an 85-year-old P6 who presents with months 2 years of postmenopausal bleeding.  She notes that she has had some bright red blood periodically sometimes it is every day for a week at a time sometimes not at all.  She was having a similar episode of bleeding when she was seeing her primary care and 2024.  An ultrasound was ordered at that point.    The ultrasound demonstrates a normal-sized uterus.  Lining of the uterus that is thin to 2 to 3 mm.  There is no masses.        She notes that she is had total of 6 pregnancies 5 of them more normal vaginal delivery and the last 1 was .    See epic for details of past medical history    Examination:  She is pleasant appropriate no apparent distress  External genitalia are normal for age  Vaginal mucosa in general is normal for age also.  There is a paucity of rugae and minimal discharge.  Cervix is without lesion and freely mobile  There is prolapse of the vaginal walls bilaterally.  Cervix seems to be intact and held high as well as the bladder and rectum do not seem to protrude    On the patient's left vaginal wall is an ulcerative type granulation tissue that is approximately 2 cm in diameter.  It does bleed easily with abrasion from a speculum.  On the ulcer appears to be a rim of what looks like granulation tissue that is uncomplicated.    We had a discussion after the examination about this.  I suggest that she will has tried estrogen cream in the past was told that it was expensive and that it was a  mess to use with the applicator.  Uncertain at what point it was suggested that she use the Estrace cream but at this point suggest that she use that again.  However as this appears to be an ulcerative type process would not rule out vaginal precancer or cancer and consequently suggest that she is seen in the next 6 weeks to follow-up and see that this is improving otherwise I suggest that there be a biopsy and or surgical resection.  She is in agreement with that.    Assessment is vaginal growth on the patient's left vaginal wall that appears to be somewhat granulation tissue and somewhat ulcerative.  Possibly benign but cannot rule out precancer or malignancy.  Suggest a benign approach to use Estrace cream twice weekly for the next month and then follow-up in 6 weeks and at that point see if this is improved.  It sounds like this is a process has been going on for several years and at present is still only 2 cm in size.      No LMP recorded (lmp unknown). Patient is postmenopausal..     Patient is sexually active, .  Using tubal ligation for contraception.    reports that she has never smoked. She has never used smokeless tobacco.    STD testing offered?  Declined    Health maintenance updated:  no    Today's PHQ-2 Score:       2025    12:33 PM   PHQ-2 ( 1999 [employer])   Q1: Little interest or pleasure in doing things 0   Q2: Feeling down, depressed or hopeless 0   PHQ-2 Score 0    Q1: Little interest or pleasure in doing things Not at all   Q2: Feeling down, depressed or hopeless Not at all   PHQ-2 Score 0       Patient-reported     Today's PHQ-9 Score:        No data to display              Today's GEOFF-7 Score:        No data to display                Problem list and histories reviewed & adjusted, as indicated.  Additional history: as documented.    Patient Active Problem List   Diagnosis    B-complex deficiency    Trigger finger    Ganglion cyst    Bariatric surgery status -     Glucose intolerance  (impaired glucose tolerance)    Hypothyroidism due to acquired atrophy of thyroid    ACP (advance care planning)    Failed total knee arthroplasty, initial encounter    Benign essential hypertension    Status post total right knee replacement on 2/4/16 by Vincent Allen MD    Class 1 obesity due to excess calories with serious comorbidity and body mass index (BMI) of 32.0 to 32.9 in adult    Hypercholesterolemia    Tachycardia since 1978    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     Primary osteoarthritis of right hip    Morbid obesity (H)     Past Surgical History:   Procedure Laterality Date    ABDOMEN SURGERY  9/2009    gastric bypass 09/2009    APPENDECTOMY  1970    1970    ARTHROPLASTY HIP Right 2/2/2021    Procedure: Right total hip arthroplasty (Niño [employer] Nephew 52 mm acetabulum;; #6 Anthology; +0 neck 32 mm ceramic head);  Surgeon: Robbie Gamble MD;  Location:  OR    ARTHROPLASTY REVISION KNEE Right 2/4/2016    Procedure: ARTHROPLASTY REVISION KNEE;  Surgeon: Vincent Allen MD;  Location:  OR    COLONOSCOPY  7/14/2014    Procedure: COLONOSCOPY;  Surgeon: Jamari Jj MD;  Location:  GI    ENT SURGERY  1947    tosillectomy     EYE SURGERY  2011    macular repair 2011 left eye    EYE SURGERY  2012    cataract removal left eye.    GYN SURGERY  1978    c section    HERNIA REPAIR  1990    umbilical 1990    ORTHOPEDIC SURGERY  1993, 1995    arthroscopic both knees    ORTHOPEDIC SURGERY  1996    Bilateral TKA    ORTHOPEDIC SURGERY  2008    Left hip replacement.    SOFT TISSUE SURGERY  2019    basal cell carcinoma removed from face, several spots      Social History     Tobacco Use    Smoking status: Never    Smokeless tobacco: Never   Substance Use Topics    Alcohol use: Yes     Alcohol/week: 0.0 standard drinks of alcohol     Comment: rare      Problem (# of Occurrences) Relation (Name,Age of Onset)    Unknown/Adopted (1) Father    Heart Disease (3) Mother, Sister, Son  (vane)    Skin Cancer (3) Mother, Sister, Brother (Barney)           Negative family history of: Diabetes, Coronary Artery Disease, Hypertension, Hyperlipidemia, Cerebrovascular Disease, Breast Cancer, Colon Cancer, Prostate Cancer, Other Cancer, Depression, Anxiety Disorder, Mental Illness, Substance Abuse, Anesthesia Reaction, Asthma, Osteoporosis, Genetic Disorder, Thyroid Disease, Obesity              Current Outpatient Medications   Medication Sig Dispense Refill    Cholecalciferol (VITAMIN D) 1000 UNITS capsule Take 1 capsule by mouth daily       Cyanocobalamin (VITAMIN B 12 PO) Take 2,500 mcg by mouth daily sublingual      ferrous sulfate (FEROSUL) 325 (65 Fe) MG tablet Take 325 mg by mouth twice a week       levothyroxine (SYNTHROID/LEVOTHROID) 50 MCG tablet TAKE 1 TABLET BY MOUTH EVERY DAY 90 tablet 3    lidocaine (LIDODERM) 5 % patch Place 1 patch over 12 hours onto the skin every 24 hours. To prevent lidocaine toxicity, patient should be patch free for 12 hrs daily. 30 patch 3    methylcellulose (CITRUCEL) powder Take 3 teaspoonful by mouth daily      metoprolol succinate ER (TOPROL XL) 25 MG 24 hr tablet Take 1 tablet (25 mg) by mouth daily 90 tablet 3    Multiple Vitamins-Minerals (ICAPS AREDS 2 PO) Take by mouth 2 times daily       OMEPRAZOLE PO Take 20 mg by mouth daily       Pediatric Multivit-Minerals-C (FLINTSTONES COMPLETE PO) Take 1 tablet by mouth daily       simvastatin (ZOCOR) 40 MG tablet Take 1 tablet (40 mg) by mouth daily 90 tablet 3     No current facility-administered medications for this visit.     Allergies   Allergen Reactions    Food Itching     Raw fruit & veg:  Apples, cherries, cantalope, tomatoes,  carrotts.  Eye & throat irritations.    Penicillins Hives    Fruit [Roberson]      Hives, itchy throat tomatoes    Nsaids Other (See Comments)     Patient had gastric bypass surgery.  Should not take oral  NSAID's ever.    Animal Dander      Pruritis,itchi eyes, throat and ears.     Erythromycin GI Disturbance    Erythromycin Ethylsuccinate GI Disturbance    Pollen Extract Itching           OBJECTIVE:     LMP  (LMP Unknown)   There is no height or weight on file to calculate BMI.    Exam:  See above    In-Clinic Test Results:  No results found for this or any previous visit (from the past 24 hours).    ASSESSMENT/PLAN:                                                      No diagnosis found.    There are no Patient Instructions on file for this visit.    See above    Suraj Dempsey MD  [facility]

## 2025-03-11 ENCOUNTER — OFFICE VISIT (OUTPATIENT)
Dept: OBGYN | Facility: CLINIC | Age: 85
End: 2025-03-11
Payer: MEDICARE

## 2025-03-11 VITALS
DIASTOLIC BLOOD PRESSURE: 62 MMHG | BODY MASS INDEX: 29.32 KG/M2 | SYSTOLIC BLOOD PRESSURE: 124 MMHG | WEIGHT: 176 LBS | HEIGHT: 65 IN

## 2025-03-11 DIAGNOSIS — N93.9 VAGINAL BLEEDING: ICD-10-CM

## 2025-03-11 DIAGNOSIS — N95.0 POST-MENOPAUSAL BLEEDING: Primary | ICD-10-CM

## 2025-03-11 DIAGNOSIS — N76.5 VAGINAL ULCER: ICD-10-CM

## 2025-03-11 LAB
CLUE CELLS: ABNORMAL
TRICHOMONAS, WET PREP: ABNORMAL
WBC'S/HIGH POWER FIELD, WET PREP: ABNORMAL
YEAST, WET PREP: ABNORMAL

## 2025-03-11 PROCEDURE — 99213 OFFICE O/P EST LOW 20 MIN: CPT | Performed by: OBSTETRICS & GYNECOLOGY

## 2025-03-11 PROCEDURE — 3078F DIAST BP <80 MM HG: CPT | Performed by: OBSTETRICS & GYNECOLOGY

## 2025-03-11 PROCEDURE — 3074F SYST BP LT 130 MM HG: CPT | Performed by: OBSTETRICS & GYNECOLOGY

## 2025-03-11 PROCEDURE — 87210 SMEAR WET MOUNT SALINE/INK: CPT | Performed by: OBSTETRICS & GYNECOLOGY

## 2025-03-11 RX ORDER — ESTRADIOL 0.1 MG/G
2 CREAM VAGINAL
Qty: 42.5 G | Refills: 2 | Status: SHIPPED | OUTPATIENT
Start: 2025-03-13

## 2025-03-11 NOTE — NURSING NOTE
"Chief Complaint   Patient presents with    Vaginal Bleeding       Initial /62   Ht 1.651 m (5' 5\")   Wt 79.8 kg (176 lb)   LMP  (LMP Unknown)   BMI 29.29 kg/m   Estimated body mass index is 29.29 kg/m  as calculated from the following:    Height as of this encounter: 1.651 m (5' 5\").    Weight as of this encounter: 79.8 kg (176 lb).  BP completed using cuff size: regular    Questioned patient about current smoking habits.  Pt. has never smoked.          The following HM Due: NONE    Wendy Riley LPN on 3/11/2025 at 2:54 PM      "

## 2025-03-23 ENCOUNTER — HEALTH MAINTENANCE LETTER (OUTPATIENT)
Age: 85
End: 2025-03-23

## 2025-04-28 NOTE — PROGRESS NOTES
SUBJECTIVE:       I spent a total of 15 minutes on the care of Aurora on the day of service including 10 minutes of face-to-face time with remainder in chart review, care coordination, documentation on the day of service.  In addition to this additional 5 minutes was used in doing a vaginal biopsy on the left vaginal growth.  Patient was an 85-year-old                                                Aurora Figueroa is a 85 year old female who presents to clinic today for the following health issue(s): follow up on post chacho bleeding. No chief complaint on file.    Elina is an 85-year-old P6 who presents for follow-up after a trial of estrogen cream that she has been using for the past 6 weeks.  She was seen 6 weeks ago at that point had a what appeared to be vaginal granulation tissue on the patient's left vaginal wall near the cervix.  Cervix otherwise looked perfectly normal.  She does have pelvic relaxation and has both some bladder cystocele as well as vaginal wall prolapse.    Examination:  She is pleasant appropriate no apparent distress  External genitalia appear normal for age  Vaginal mucosa appears normal with the exception of on the left sidewall there is an area that is approximately 1 inch in diameter of what appears to be granulation tissue however it no longer has the cherry red look to it and no longer is bleeding with the speculum being applied near at.  There is however purulence coating over some of that area.    Suggested that we do a biopsy just to evaluate further and she is agreeable and the consent was signed.  Kevorkian curette was used to to sample cells and tissue off of the specimen.  It was then further collected with  Cytobrush aggressively sampling the surface.    She and I talked about using an antibiotic cream for 1 week and a prescription of Cleocin vaginal cream was given also suggesting that she continue on with the estrogen cream.  She notes that the Premarin cream would  likely be more and expensive and a prescription was given to her for that.    Assessment is likely granulation tissue that seems improved with the estrogen cream however she still is having spotting every 2 to 3 weeks.    Plan biopsy is done on the surface of the granulation tissue.  Will await those results and she is given prescriptions for both Cleocin cream as well as Premarin cream at this time.  Follow-up 6  :  no    Today's PHQ-2 Score:       1/22/2025    12:33 PM   PHQ-2 ( 1999 [employer])   Q1: Little interest or pleasure in doing things 0   Q2: Feeling down, depressed or hopeless 0   PHQ-2 Score 0    Q1: Little interest or pleasure in doing things Not at all   Q2: Feeling down, depressed or hopeless Not at all   PHQ-2 Score 0       Patient-reported     Today's PHQ-9 Score:        No data to display              Today's GEOFF-7 Score:        No data to display                Problem list and histories reviewed & adjusted, as indicated.  Additional history: as documented.    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 by Vincent Allen MD    Class 1 obesity due to excess calories with serious comorbidity and body mass index (BMI) of 32.0 to 32.9 in adult    Hypercholesterolemia    Tachycardia since 1978    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     Primary osteoarthritis of right hip    Morbid obesity (H)     Past Surgical History:   Procedure Laterality Date    ABDOMEN SURGERY  9/2009    gastric bypass 09/2009    APPENDECTOMY  1970    1970    ARTHROPLASTY HIP Right 2/2/2021    Procedure: Right total hip arthroplasty (Niño and Nephew  52 mm acetabulum;; #6 Anthology; +0 neck 32 mm ceramic head);  Surgeon: Robbie Gamble MD;  Location: [facility] OR    ARTHROPLASTY REVISION KNEE Right 2/4/2016    Procedure: ARTHROPLASTY REVISION KNEE;  Surgeon: Vincent Allen MD;  Location:  OR    COLONOSCOPY  7/14/2014    Procedure: COLONOSCOPY;  Surgeon: Jamari Jj MD;  Location:  GI    ENT SURGERY  1947    tosillectomy     EYE SURGERY  2011    macular repair 2011 left eye    EYE SURGERY  2012    cataract removal left eye.    GYN SURGERY  1978    c section    HERNIA REPAIR  1990    umbilical 1990    ORTHOPEDIC SURGERY  1993, 1995    arthroscopic both knees    ORTHOPEDIC SURGERY  1996    Bilateral TKA    ORTHOPEDIC SURGERY  2008    Left hip replacement.    SOFT TISSUE SURGERY  2019    basal cell carcinoma removed from face, several spots      Social History     Tobacco Use    Smoking status: Never    Smokeless tobacco: Never   Substance Use Topics    Alcohol use: Yes     Alcohol/week: 0.0 standard drinks of alcohol     Comment: rare      Problem (# of Occurrences) Relation (Name,Age of Onset)    Unknown/Adopted (1) Father    Heart Disease (3) Mother, Sister, Son (vane)    Skin Cancer (3) Mother, Sister, Brother (Barney)           Negative family history of: Diabetes, Coronary Artery Disease, Hypertension, Hyperlipidemia, Cerebrovascular Disease, Breast Cancer, Colon Cancer, Prostate Cancer, Other Cancer, Depression, Anxiety Disorder, Mental Illness, Substance Abuse, Anesthesia Reaction, Asthma, Osteoporosis, Genetic Disorder, Thyroid Disease, Obesity              Current Outpatient Medications   Medication Sig Dispense Refill    Cholecalciferol (VITAMIN D) 1000 UNITS capsule Take 1 capsule by mouth daily       Cyanocobalamin (VITAMIN B 12 PO) Take 2,500 mcg by mouth daily sublingual      estradiol (ESTRACE) 0.1 MG/GM vaginal cream Place 2 g vaginally twice a week. 42.5 g 2    ferrous sulfate (FEROSUL) 325 (65 Fe) MG tablet Take 325 mg by mouth twice a  week       levothyroxine (SYNTHROID/LEVOTHROID) 50 MCG tablet TAKE 1 TABLET BY MOUTH EVERY DAY 90 tablet 3    methylcellulose (CITRUCEL) powder Take 3 teaspoonful by mouth daily      metoprolol succinate ER (TOPROL XL) 25 MG 24 hr tablet Take 1 tablet (25 mg) by mouth daily 90 tablet 3    Multiple Vitamins-Minerals (ICAPS AREDS 2 PO) Take by mouth 2 times daily       OMEPRAZOLE PO Take 20 mg by mouth daily       Pediatric Multivit-Minerals-C (FLINTSTONES COMPLETE PO) Take 1 tablet by mouth daily       simvastatin (ZOCOR) 40 MG tablet Take 1 tablet (40 mg) by mouth daily 90 tablet 3     No current facility-administered medications for this visit.     Allergies   Allergen Reactions    Food Itching     Raw fruit & veg:  Apples, cherries, cantalope, tomatoes,  carrotts.  Eye & throat irritations.    Penicillins Hives    Fruit [Roberson]      Hives, itchy throat tomatoes    Nsaids Other (See Comments)     Patient had gastric bypass surgery.  Should not take oral  NSAID's ever.    Animal Dander      Pruritis,itchi eyes, throat and ears.    Erythromycin GI Disturbance    Erythromycin Ethylsuccinate GI Disturbance    Pollen Extract Itching       ROS:  Patient reports still having occasional spotting that has not changed precipitously with the use of estrogen cream       OBJECTIVE:     LMP  (LMP Unknown)   There is no height or weight on file to calculate BMI.    Exam:  See above    In-Clinic Test Results:  No results found for this or any previous visit (from the past 24 hours).    ASSESSMENT/PLAN:                                                      See above    Suraj Dempsey MD  [facility]

## 2025-05-01 ENCOUNTER — OFFICE VISIT (OUTPATIENT)
Dept: OBGYN | Facility: CLINIC | Age: 85
End: 2025-05-01
Payer: MEDICARE

## 2025-05-01 DIAGNOSIS — N95.0 POST-MENOPAUSAL BLEEDING: Primary | ICD-10-CM

## 2025-05-01 RX ORDER — CLINDAMYCIN PHOSPHATE 20 MG/G
1 CREAM VAGINAL AT BEDTIME
Qty: 40 G | Refills: 0 | Status: SHIPPED | OUTPATIENT
Start: 2025-05-01

## 2025-05-05 LAB
PATH REPORT.COMMENTS IMP SPEC: NORMAL
PATH REPORT.COMMENTS IMP SPEC: NORMAL
PATH REPORT.FINAL DX SPEC: NORMAL
PATH REPORT.GROSS SPEC: NORMAL
PATH REPORT.MICROSCOPIC SPEC OTHER STN: NORMAL
PATH REPORT.RELEVANT HX SPEC: NORMAL
PHOTO IMAGE: NORMAL

## 2025-05-08 ENCOUNTER — RESULTS FOLLOW-UP (OUTPATIENT)
Dept: OBGYN | Facility: CLINIC | Age: 85
End: 2025-05-08

## 2025-05-15 DIAGNOSIS — E03.4 HYPOTHYROIDISM DUE TO ACQUIRED ATROPHY OF THYROID: Chronic | ICD-10-CM

## 2025-05-15 RX ORDER — LEVOTHYROXINE SODIUM 50 UG/1
50 TABLET ORAL
Qty: 90 TABLET | Refills: 3 | Status: SHIPPED | OUTPATIENT
Start: 2025-05-15

## 2025-06-02 ENCOUNTER — PATIENT OUTREACH (OUTPATIENT)
Dept: CARE COORDINATION | Facility: CLINIC | Age: 85
End: 2025-06-02
Payer: MEDICARE

## 2025-06-03 ENCOUNTER — OFFICE VISIT (OUTPATIENT)
Dept: INTERNAL MEDICINE | Facility: CLINIC | Age: 85
End: 2025-06-03
Payer: MEDICARE

## 2025-06-03 ENCOUNTER — ANCILLARY PROCEDURE (OUTPATIENT)
Dept: MAMMOGRAPHY | Facility: CLINIC | Age: 85
End: 2025-06-03
Attending: INTERNAL MEDICINE
Payer: MEDICARE

## 2025-06-03 VITALS
RESPIRATION RATE: 17 BRPM | HEART RATE: 59 BPM | SYSTOLIC BLOOD PRESSURE: 134 MMHG | WEIGHT: 175.1 LBS | HEIGHT: 65 IN | DIASTOLIC BLOOD PRESSURE: 75 MMHG | TEMPERATURE: 97.4 F | BODY MASS INDEX: 29.17 KG/M2 | OXYGEN SATURATION: 98 %

## 2025-06-03 DIAGNOSIS — R07.89 ATYPICAL CHEST PAIN: ICD-10-CM

## 2025-06-03 DIAGNOSIS — Z12.31 VISIT FOR SCREENING MAMMOGRAM: ICD-10-CM

## 2025-06-03 DIAGNOSIS — E03.4 HYPOTHYROIDISM DUE TO ACQUIRED ATROPHY OF THYROID: ICD-10-CM

## 2025-06-03 DIAGNOSIS — E66.01 MORBID OBESITY (H): ICD-10-CM

## 2025-06-03 DIAGNOSIS — I10 BENIGN ESSENTIAL HYPERTENSION: Primary | ICD-10-CM

## 2025-06-03 DIAGNOSIS — R73.02 GLUCOSE INTOLERANCE (IMPAIRED GLUCOSE TOLERANCE): ICD-10-CM

## 2025-06-03 DIAGNOSIS — E78.00 HYPERCHOLESTEROLEMIA: ICD-10-CM

## 2025-06-03 LAB
ALBUMIN SERPL BCG-MCNC: 4.2 G/DL (ref 3.5–5.2)
ALP SERPL-CCNC: 86 U/L (ref 40–150)
ALT SERPL W P-5'-P-CCNC: 33 U/L (ref 0–50)
ANION GAP SERPL CALCULATED.3IONS-SCNC: 9 MMOL/L (ref 7–15)
AST SERPL W P-5'-P-CCNC: 34 U/L (ref 0–45)
BILIRUB SERPL-MCNC: 0.5 MG/DL
BUN SERPL-MCNC: 23 MG/DL (ref 8–23)
CALCIUM SERPL-MCNC: 9.7 MG/DL (ref 8.8–10.4)
CHLORIDE SERPL-SCNC: 105 MMOL/L (ref 98–107)
CHOLEST SERPL-MCNC: 170 MG/DL
CREAT SERPL-MCNC: 0.8 MG/DL (ref 0.51–0.95)
EGFRCR SERPLBLD CKD-EPI 2021: 73 ML/MIN/1.73M2
EST. AVERAGE GLUCOSE BLD GHB EST-MCNC: 120 MG/DL
FASTING STATUS PATIENT QL REPORTED: YES
FASTING STATUS PATIENT QL REPORTED: YES
GLUCOSE SERPL-MCNC: 116 MG/DL (ref 70–99)
HBA1C MFR BLD: 5.8 % (ref 0–5.6)
HCO3 SERPL-SCNC: 27 MMOL/L (ref 22–29)
HDLC SERPL-MCNC: 66 MG/DL
LDLC SERPL CALC-MCNC: 88 MG/DL
NONHDLC SERPL-MCNC: 104 MG/DL
POTASSIUM SERPL-SCNC: 4.3 MMOL/L (ref 3.4–5.3)
PROT SERPL-MCNC: 7.2 G/DL (ref 6.4–8.3)
SODIUM SERPL-SCNC: 141 MMOL/L (ref 135–145)
TRIGL SERPL-MCNC: 78 MG/DL

## 2025-06-03 PROCEDURE — 36415 COLL VENOUS BLD VENIPUNCTURE: CPT | Performed by: INTERNAL MEDICINE

## 2025-06-03 PROCEDURE — G2211 COMPLEX E/M VISIT ADD ON: HCPCS | Performed by: INTERNAL MEDICINE

## 2025-06-03 PROCEDURE — 93000 ELECTROCARDIOGRAM COMPLETE: CPT | Performed by: INTERNAL MEDICINE

## 2025-06-03 PROCEDURE — 77067 SCR MAMMO BI INCL CAD: CPT | Mod: TC | Performed by: RADIOLOGY

## 2025-06-03 PROCEDURE — 80061 LIPID PANEL: CPT | Performed by: INTERNAL MEDICINE

## 2025-06-03 PROCEDURE — 83036 HEMOGLOBIN GLYCOSYLATED A1C: CPT | Performed by: INTERNAL MEDICINE

## 2025-06-03 PROCEDURE — 99214 OFFICE O/P EST MOD 30 MIN: CPT | Performed by: INTERNAL MEDICINE

## 2025-06-03 PROCEDURE — 3078F DIAST BP <80 MM HG: CPT | Performed by: INTERNAL MEDICINE

## 2025-06-03 PROCEDURE — 77063 BREAST TOMOSYNTHESIS BI: CPT | Mod: TC | Performed by: RADIOLOGY

## 2025-06-03 PROCEDURE — 80053 COMPREHEN METABOLIC PANEL: CPT | Performed by: INTERNAL MEDICINE

## 2025-06-03 RX ORDER — METOPROLOL SUCCINATE 25 MG/1
25 TABLET, EXTENDED RELEASE ORAL DAILY
Qty: 90 TABLET | Refills: 3 | Status: SHIPPED | OUTPATIENT
Start: 2025-06-03

## 2025-06-03 RX ORDER — SIMVASTATIN 40 MG
40 TABLET ORAL DAILY
Qty: 90 TABLET | Refills: 3 | Status: SHIPPED | OUTPATIENT
Start: 2025-06-03

## 2025-06-03 NOTE — PROGRESS NOTES
"  Assessment & Plan     Benign essential hypertension  Stable on therapy continue with medical management.  - metoprolol succinate ER (TOPROL XL) 25 MG 24 hr tablet; Take 1 tablet (25 mg) by mouth daily.    Atypical chest pain  Nonspecific symptoms, atypical in nature.  EKG essentially benign as compared to prior EKGs done.  No evidence of obvious recurrent history since although does have atypical windedness.  Suggest stress test for reassurance.  Patient willing to go forward with such.  Call if symptoms worsen prior.  - EKG 12-lead complete w/read - Clinics  - Echocardiogram Exercise Stress; Future    Hypothyroidism due to acquired atrophy of thyroid  TSH   Date Value Ref Range Status   11/13/2024 2.64 0.30 - 4.20 uIU/mL Final   02/11/2022 2.86 0.40 - 4.00 mU/L Final   11/09/2020 2.37 0.40 - 4.00 mU/L Final   Therapeutic as noted.      Morbid obesity (H)  Advised ongoing weight loss and weight reduction.    Glucose intolerance (impaired glucose tolerance)  Check A1c and basic panel  - Comprehensive metabolic panel (BMP + Alb, Alk Phos, ALT, AST, Total. Bili, TP); Future  - Hemoglobin A1c; Future    Hypercholesterolemia  Continue with statin therapy for goal risk reduction  - Lipid panel reflex to direct LDL Non-fasting; Future  - simvastatin (ZOCOR) 40 MG tablet; Take 1 tablet (40 mg) by mouth daily.    Visit for screening mammogram  As routine screening.  - MA Screening Bilateral w/ Juanito; Future      Reviewed and recommended routine updated vaccinations    BMI  Estimated body mass index is 29.14 kg/m  as calculated from the following:    Height as of this encounter: 1.651 m (5' 5\").    Weight as of this encounter: 79.4 kg (175 lb 1.6 oz).   Weight management plan: Discussed healthy diet and exercise guidelines      Subjective   Aurora is a 85 year old, presenting for the following health issues:  Diabetes    History of Present Illness       Diabetes:   She presents for follow up of diabetes.    She is not " checking blood glucose.         She has no concerns regarding her diabetes at this time.  She is having weight loss.            She eats 2-3 servings of fruits and vegetables daily.She consumes 1 sweetened beverage(s) daily.She exercises with enough effort to increase her heart rate 9 or less minutes per day.  She exercises with enough effort to increase her heart rate 3 or less days per week.   She is taking medications regularly.        Other concerns:  Episode of center chest discomfort that lasted approximately 5 minutes in the middle of the night.  Her symptoms are very nonspecific.  This episode happened about a month ago at the time she was supposed to see me in the clinic but then subsequently had her appointment rescheduled.  She has had minimal to no recurrent symptoms since although at times she feels that she is maybe a little bit more winded when she exerts herself.  Her symptoms are not typical of classic angina.  She denies significant GERD.  She has been seen by cardiologist in the distant past for atypical symptoms related to bradycardia and palpitations.    Does currently take a statin as well as a beta-blocker.    Distant past she has undergone a stress test which revealed abnormal left systolic function she therefore underwent a coronary angiogram and it did not demonstrate significant obstructive epicardial coronary disease.  Her left ventricular function was considered borderline low and there was a question of an apical wall motion abnormality thus a contrast echocardiogram was performed for which the apex did appear to be moving normally with normal function.    It appears that since then she has not been seen or evaluated via her cardiologist but had been seen by her family practice provider up until seeing me initially in April 2023.    Review of Systems  CONSTITUTIONAL: NEGATIVE for fever, chills, change in weight  EYES: NEGATIVE for vision changes or irritation  ENT/MOUTH: NEGATIVE for  "ear, mouth and throat problems  RESP: NEGATIVE for significant cough or SOB  CV: NEGATIVE for palpitations or peripheral edema  GI: NEGATIVE for nausea, abdominal pain, heartburn, or change in bowel habits  : NEGATIVE for frequency, dysuria, or hematuria  MUSCULOSKELETAL: NEGATIVE for significant arthralgias or myalgia  NEURO: NEGATIVE for weakness, dizziness or paresthesias  HEME: NEGATIVE for bleeding problems  PSYCHIATRIC: NEGATIVE for changes in mood or affect      Objective    /75   Pulse 59   Temp 97.4  F (36.3  C) (Temporal)   Resp 17   Ht 1.651 m (5' 5\")   Wt 79.4 kg (175 lb 1.6 oz)   LMP  (LMP Unknown)   SpO2 98%   BMI 29.14 kg/m    Body mass index is 29.14 kg/m .    Physical Exam   GENERAL: alert and no distress  EYES: Eyes grossly normal to inspection, PERRL and conjunctivae and sclerae normal  HENT: ear canals and TM's normal, nose and mouth without ulcers or lesions  RESP: lungs clear to auscultation - no rales, rhonchi or wheezes  CV: regular rate and rhythm, normal S1 S2, no S3 or S4, no click or rub, no peripheral edema  ABDOMEN: soft, nontender, no hepatosplenomegaly, no masses and bowel sounds normal  NEURO: No focal changes  PSYCH: mentation appears normal, affect normal/bright    EKG repeated demonstrates a normal sinus rhythm with a bradycardic rate of 55.  There is nonspecific isoelectric T waves noted in V2 but no further distinct acute changes are noted per baseline.        Signed Electronically by: Jasbir Garcia MD    "

## 2025-06-04 ENCOUNTER — RESULTS FOLLOW-UP (OUTPATIENT)
Dept: INTERNAL MEDICINE | Facility: CLINIC | Age: 85
End: 2025-06-04

## 2025-06-04 DIAGNOSIS — I49.9 CARDIAC ARRHYTHMIA, UNSPECIFIED CARDIAC ARRHYTHMIA TYPE: Primary | ICD-10-CM

## 2025-06-10 ENCOUNTER — TELEPHONE (OUTPATIENT)
Dept: CARDIOLOGY | Facility: CLINIC | Age: 85
End: 2025-06-10
Payer: MEDICARE

## 2025-06-10 NOTE — TELEPHONE ENCOUNTER
1st attempt- Left voicemail for the patient to call back and schedule the following:    Appointment type:  Testing only- No clinic visit  Provider:  angelica  Return date:  next available  Additional appointment(s) needed:  n/a  Additional Notes:  pt due for echo stress test  Specialty phone number: [phone]

## 2025-06-18 ENCOUNTER — HOSPITAL ENCOUNTER (OUTPATIENT)
Dept: CARDIOLOGY | Facility: CLINIC | Age: 85
Discharge: HOME | End: 2025-06-18
Attending: INTERNAL MEDICINE
Payer: MEDICARE

## 2025-06-18 ENCOUNTER — ORDERS ONLY (AUTO-RELEASED) (OUTPATIENT)
Dept: INTERNAL MEDICINE | Facility: CLINIC | Age: 85
End: 2025-06-18

## 2025-06-18 DIAGNOSIS — R07.89 ATYPICAL CHEST PAIN: ICD-10-CM

## 2025-06-18 PROCEDURE — 93325 DOPPLER ECHO COLOR FLOW MAPG: CPT | Mod: TC

## 2025-06-19 ENCOUNTER — PATIENT OUTREACH (OUTPATIENT)
Dept: CARE COORDINATION | Facility: CLINIC | Age: 85
End: 2025-06-19
Payer: MEDICARE

## 2025-06-19 NOTE — PROGRESS NOTES
SUBJECTIVE:      I spent a total of 20 minutes on the care of Aurora on the day of service including 15 minutes of face-to-face time with remainder in chart review, care coordination, documentation on the day of service.  Aurora has previously been seen for mainly the issue of vaginal bleeding.  She has                                                 Aurora Figueroa is a 85 year old female who presents to clinic today for the following health issue(s):  follow up from EMB/ post menopausal bleeding  No chief complaint on file.    History of pelvic relaxation where has a prominent cystocele as well as rectocele.  However in that evaluation it appears that the issue most concerning currently is vaginal bleeding and sort ulcer on the left vaginal wall.    Her course to date includes being seen in having biopsy of the source.  He came back granulation tissue.  She was then treated with  Estrace cream and on return visit no longer had bleeding however had a greenish purulent material covering the surface.  At that point she was also treated with a vaginal antibiotic.  Today she states that she still has vaginal spotting after placement of the estrogen cream for approximately a day.    Examination:  She is pleasant appropriate no apparent distress  External genitalia are normal for age  Vaginal mucosa is healthy in appearance.  There is the exception of the area that still on the vaginal left wall, still has about the same size area of ulceration but there is no longer what appears to be granulation tissue nor purulent discharge adhering to the surface.  No signs of induration or inflammation.    Assessment:  Aurora is an 85-year-old with a history of multiple vaginal births.  She has significant vaginal relaxation and but evidence on the left vaginal wall is this essentially an ulcerative appearing tissue.  Does not appear to be infected or inflamed at this point.  We discussed a number of options.  1 would  be to continue on with the estrogen cream.  Additionally potentially resect the lesion that is present.  She has still would have the issue of the cystocele and rectocele and potentially exacerbating the issue of the ulcer.       Plan:  At this point suggest that she have a second opinion with  urogynecology.  Refill was given for Estrace cream     Follow-up in approximately 2 months in this office.            Today's PHQ-2 Score:       1/22/2025    12:33 PM   PHQ-2 ( 1999 [employer])   Q1: Little interest or pleasure in doing things 0   Q2: Feeling down, depressed or hopeless 0   PHQ-2 Score 0    Q1: Little interest or pleasure in doing things Not at all   Q2: Feeling down, depressed or hopeless Not at all   PHQ-2 Score 0       Patient-reported     Today's PHQ-9 Score:        No data to display              Today's GEOFF-7 Score:        No data to display                Problem list and histories reviewed & adjusted, as indicated.  Additional history: as documented.    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 by Vincent Allen MD    Class 1 obesity due to excess calories with serious comorbidity and body mass index (BMI) of 32.0 to 32.9 in adult    Hypercholesterolemia    Tachycardia since 1978    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     Primary osteoarthritis of right hip    Morbid obesity (H)     Past Surgical History:   Procedure Laterality Date    ABDOMEN SURGERY  9/2009    gastric bypass 09/2009    APPENDECTOMY  1970    1970    ARTHROPLASTY HIP Right 2/2/2021    Procedure: Right total hip arthroplasty ([employer] 52 mm  acetabulum;; #6 Anthology; +0 neck 32 mm ceramic head);  Surgeon: Robbie Gamble MD;  Location: [facility] OR    ARTHROPLASTY REVISION KNEE Right 2/4/2016    Procedure: ARTHROPLASTY REVISION KNEE;  Surgeon: Vincent Allen MD;  Location:  OR    COLONOSCOPY  7/14/2014    Procedure: COLONOSCOPY;  Surgeon: Jamari Jj MD;  Location:  GI    ENT SURGERY  1947    tosillectomy     EYE SURGERY  2011    macular repair 2011 left eye    EYE SURGERY  2012    cataract removal left eye.    GYN SURGERY  1978    c section    HERNIA REPAIR  1990    umbilical 1990    ORTHOPEDIC SURGERY  1993, 1995    arthroscopic both knees    ORTHOPEDIC SURGERY  1996    Bilateral TKA    ORTHOPEDIC SURGERY  2008    Left hip replacement.    SOFT TISSUE SURGERY  2019    basal cell carcinoma removed from face, several spots      Social History     Tobacco Use    Smoking status: Never    Smokeless tobacco: Never   Substance Use Topics    Alcohol use: Yes     Alcohol/week: 0.0 standard drinks of alcohol     Comment: rare      Problem (# of Occurrences) Relation (Name,Age of Onset)    Unknown/Adopted (1) Father    Heart Disease (3) Mother, Sister, Son (vane)    Skin Cancer (3) Mother, Sister, Brother (Barney)           Negative family history of: Diabetes, Coronary Artery Disease, Hypertension, Hyperlipidemia, Cerebrovascular Disease, Breast Cancer, Colon Cancer, Prostate Cancer, Other Cancer, Depression, Anxiety Disorder, Mental Illness, Substance Abuse, Anesthesia Reaction, Asthma, Osteoporosis, Genetic Disorder, Thyroid Disease, Obesity              Current Outpatient Medications   Medication Sig Dispense Refill    Cholecalciferol (VITAMIN D) 1000 UNITS capsule Take 1 capsule by mouth daily       Cyanocobalamin (VITAMIN B 12 PO) Take 2,500 mcg by mouth daily sublingual      estradiol (ESTRACE) 0.1 MG/GM vaginal cream Place 2 g vaginally twice a week. 42.5 g 2    ferrous sulfate (FEROSUL) 325 (65 Fe) MG tablet Take 325 mg by mouth twice a week        levothyroxine (SYNTHROID/LEVOTHROID) 50 MCG tablet TAKE 1 TABLET BY MOUTH EVERY DAY 90 tablet 3    methylcellulose (CITRUCEL) powder Take 3 teaspoonful by mouth daily      metoprolol succinate ER (TOPROL XL) 25 MG 24 hr tablet Take 1 tablet (25 mg) by mouth daily. 90 tablet 3    Multiple Vitamins-Minerals (ICAPS AREDS 2 PO) Take by mouth 2 times daily       OMEPRAZOLE PO Take 20 mg by mouth daily       Pediatric Multivit-Minerals-C (FLINTSTONES COMPLETE PO) Take 1 tablet by mouth daily       simvastatin (ZOCOR) 40 MG tablet Take 1 tablet (40 mg) by mouth daily. 90 tablet 3     No current facility-administered medications for this visit.     Allergies   Allergen Reactions    Food Itching     Raw fruit & veg:  Apples, cherries, cantalope, tomatoes,  carrotts.  Eye & throat irritations.    Penicillins Hives    Fruit [Roberson]      Hives, itchy throat tomatoes    Nsaids Other (See Comments)     Patient had gastric bypass surgery.  Should not take oral  NSAID's ever.    Animal Dander      Pruritis,itchi eyes, throat and ears.    Erythromycin GI Disturbance    Erythromycin Ethylsuccinate GI Disturbance    Pollen Extract Itching       See above  OBJECTIVE:     LMP  (LMP Unknown)   There is no height or weight on file to calculate BMI.    Exam:  See above    In-Clinic Test Results:  Results for orders placed or performed during the hospital encounter of 25 (from the past 24 hours)   Echocardiogram Exercise Stress    Narrative    [id number]  [id number]  [id number]  [id number]^ANGEL^TOM^BEL     [facility]  [address]  [address]  [place] [address]  Phone [phone]  Fax [phone]     Name: RAMÓN LOW  MRN: [id number]  : 1940  Study Date: 2025 10:59 AM  Age: 85 yrs  Gender: Female  Patient [facility]: [facility]  Reason For Study: Atypical chest pain  Ordering Physician: TOM ORTIZ  Referring Physician: OTM ORTIZ  Performed  By: Dwight Hurley     BSA: 1.9 m2  Height: 65 in  Weight: 175 lb  HR: 68  BP: 142/74 mmHg  ______________________________________________________________________________  Procedure  Stress Echo Treadmill with two dimensional, color and spectral Doppler.     ______________________________________________________________________________  Interpretation Summary  1. The patient exhibited no chest pain during exercise.  2. There were no ST segment changes observed with stress. In early exercise,  frequent PVC's, often in bigeminy pattern, at peak exercise only a few PVC's.  1:30 into recovery short run of fairly regular tachycardia, rate 118, lasting  <15 seconds, may be flutter.  3. Rest echo: Normal left ventricular function and wall motion at rest. The  visual ejection fraction is estimated at 50-55%.  4. Stress echo: This was a normal stress echocardiogram with no evidence of  stress-induced ischemia. The visual ejection fraction is 55-60%.     Stress echo 2013 showed frequent PVC's with exercise, distal anteroseptal and  anterolateral hypokinesis with stress.  ______________________________________________________________________________  Stress  The patient exercised 6:00.  The patient exhibited no chest pain during exercise.  The patient exhibited dyspnea during exercise.  Exercise was stopped due to fatigue.  There was a normal BP response to exercise.  RPP 22,780.  A treadmill exercise test according to the Modified Naseem protocol was  performed.  Target Heart Rate was achieved.  There were no ST segment changes observed with stress.  In early exercise, frequent PVC's, often in bigeminy pattern, at peak exercise  only a few PVC's. 1:30 into recovery short run of fairly regular tachycardia,  rate 118, lasting <15 seconds, may be flutter.  This was a normal stress echocardiogram with no evidence of stress-induced  ischemia.  The visual ejection fraction is 55-60%.     Baseline  Resting ECG is normal.  Normal left  ventricular function and wall motion at rest.  The visual ejection fraction is estimated at 50-55%.     Stress Results  Protocol:  Modified Naseem Protocol        Maximum Predicted HR:   137 bpm  Target HR: 116 bpm                        % Maximum Predicted HR: 98 %            Stage  DurationHeart Rate  BP            Comment                (mm:ss)   (bpm)        Stage 1   3:00     116    158/80        Stage 2   3:00     134    170/78RPP: 22,780; FAC: BELOW AVG       RECOVERYR  5:00      81    146/80         Stress Duration:   6:00 mm:ss        Recovery Time: 5:00 mm:ss      Maximum Stress HR: 134 bpm           METS:          3     Mitral Valve  There is no mitral regurgitation noted.     Tricuspid Valve  There is mild (1+) tricuspid regurgitation.     Aortic Valve  The aortic valve is normal in structure and function.  ______________________________________________________________________________  MMode/2D Measurements & Calculations  IVSd: 0.80 cm     LVIDd: 5.4 cm  LVIDs: 3.2 cm  LVPWd: 0.90 cm  FS: 40.7 %  LV mass(C)dI: 89.6 grams/m2  Ao root diam: 3.1 cm  asc Aorta Diam: 3.8 cm  LVOT diam: 2.1 cm  LVOT area: 3.5 cm2  Ao root diam index Ht(cm/m): 1.9  Ao root diam index BSA (cm/m2): 1.7  Asc Ao diam index BSA (cm/m2): 2.0  Asc Ao diam index Ht(cm/m): 2.3  RWT: 0.33     Doppler Measurements & Calculations  TR max rafat: 266.3 cm/sec  TR max P.4 mmHg     ______________________________________________________________________________  Report approved by: Juan Pablo John MD on 2025 12:04 PM             ASSESSMENT/PLAN:                                                      See above    Suraj Dempsey MD  [facility]

## 2025-07-10 ENCOUNTER — OFFICE VISIT (OUTPATIENT)
Dept: OBGYN | Facility: CLINIC | Age: 85
End: 2025-07-10
Payer: MEDICARE

## 2025-07-10 VITALS — DIASTOLIC BLOOD PRESSURE: 60 MMHG | SYSTOLIC BLOOD PRESSURE: 120 MMHG | BODY MASS INDEX: 29.29 KG/M2 | WEIGHT: 176 LBS

## 2025-07-10 DIAGNOSIS — N95.0 POST-MENOPAUSAL BLEEDING: ICD-10-CM

## 2025-07-10 DIAGNOSIS — N93.9 VAGINAL BLEEDING: ICD-10-CM

## 2025-07-10 DIAGNOSIS — N76.5 VAGINAL ULCER: ICD-10-CM

## 2025-07-10 RX ORDER — ESTRADIOL 0.1 MG/G
2 CREAM VAGINAL
Qty: 42.5 G | Refills: 2 | Status: SHIPPED | OUTPATIENT
Start: 2025-07-10

## 2025-07-10 NOTE — NURSING NOTE
"Chief Complaint   Patient presents with    Follow Up     Granulation tissue       Initial /60 (BP Location: Left arm, Patient Position: Chair, Cuff Size: Adult Large)   Wt 79.8 kg (176 lb)   LMP  (LMP Unknown)   BMI 29.29 kg/m   Estimated body mass index is 29.29 kg/m  as calculated from the following:    Height as of 6/3/25: 1.651 m (5' 5\").    Weight as of this encounter: 79.8 kg (176 lb).  BP completed using cuff size: large    Questioned patient about current smoking habits.  Pt. has never smoked.          The following HM Due: NONE    Angi John CMA         "

## 2025-07-14 ENCOUNTER — PATIENT OUTREACH (OUTPATIENT)
Dept: CARE COORDINATION | Facility: CLINIC | Age: 85
End: 2025-07-14
Payer: MEDICARE

## 2025-07-24 ENCOUNTER — VIRTUAL VISIT (OUTPATIENT)
Dept: INTERNAL MEDICINE | Facility: CLINIC | Age: 85
End: 2025-07-24
Payer: MEDICARE

## 2025-07-24 DIAGNOSIS — I47.10 SVT (SUPRAVENTRICULAR TACHYCARDIA): Primary | ICD-10-CM

## 2025-07-24 NOTE — PROGRESS NOTES
Aurora is a 85 year old who is being evaluated via a billable telephone visit.    What phone number would you like to be contacted at? Home mobile  How would you like to obtain your AVS? [employer]  Originating Location (pt. Location): Home    Distant Location (provider location):  On-site  Telephone visit completed due to the patient did not consent to a video visit.    Assessment & Plan     SVT (supraventricular tachycardia)  Discussed with patient the results of stress test as well as the numerous episodes of SVT noted on the monitor.  I have advised the patient continue with her metoprolol accordingly.  She has not had any worsening of her symptoms but I highly suggest that she needs to be seen by cardiology to determine if the frequency episodes of SVT and her symptoms as noted we will require further evaluation from their perspective or additional treatment.    She is in agreement with follow-up and will contact me if new symptoms develop.    Subjective   Aurora is a 85 year old, presenting for the following health issues:    HPI      Seen and evaluated in the clinic for atypical chest symptoms.  She underwent a stress test the results of which are listed below:    Prior symptoms were brief in onset lasting only 5 minutes during the middle of the night but very nonspecific.  This happened a month prior to seeing me in the early part of June.  Not classically anginal she had not had recurrent symptoms although she had noted that she had female, little bit more winded when she exerted herself.    In the distant past she has undergone a stress test which revealed abnormal left systolic function she therefore underwent a coronary angiogram and it did not demonstrate significant obstructive epicardial coronary disease. Her left ventricular function was considered borderline low and there was a question of an apical wall motion abnormality thus a contrast echocardiogram was performed for which the apex did appear  to be moving normally with normal function     6/18/2025 Updated stress test interpretation Summary  1. The patient exhibited no chest pain during exercise.  2. There were no ST segment changes observed with stress. In early exercise, frequent PVC's, often in bigeminy pattern, at peak exercise only a few PVC's.  1:30 into recovery short run of fairly regular tachycardia, rate 118, lasting <15 seconds, may be flutter.  3. Rest echo: Normal left ventricular function and wall motion at rest. The visual ejection fraction is estimated at 50-55%.  4. Stress echo: This was a normal stress echocardiogram with no evidence of stress-induced ischemia. The visual ejection fraction is 55-60%.     Stress echo 2013 showed frequent PVC's with exercise, distal anteroseptal and anterolateral hypokinesis with stress.    Followed up with a Zio patch which demonstrated occasional PACs and over 500 individual runs of SVT up to 5 minutes with rates in the 120s.    She did inform me that despite the frequent episodes of SVT noted on the Zio patch she did not really feel a lot of the symptoms less the last few hours of her monitoring which she did not trigger the device.    Results were reviewed with the patient.  She has been on metoprolol and notes that her symptoms have been stable without any distinct worsening.  She denies any classic or distinct syncopal or presyncopal complaints.  She denies any chest discomfort.    Scheduled to see a cardiologist upcoming.    Current Outpatient Medications   Medication Sig Dispense Refill    Cholecalciferol (VITAMIN D) 1000 UNITS capsule Take 1 capsule by mouth daily       Cyanocobalamin (VITAMIN B 12 PO) Take 2,500 mcg by mouth daily sublingual      estradiol (ESTRACE) 0.1 MG/GM vaginal cream Place 2 g vaginally twice a week. 42.5 g 2    ferrous sulfate (FEROSUL) 325 (65 Fe) MG tablet Take 325 mg by mouth twice a week       levothyroxine (SYNTHROID/LEVOTHROID) 50 MCG tablet TAKE 1 TABLET BY MOUTH  EVERY DAY 90 tablet 3    methylcellulose (CITRUCEL) powder Take 3 teaspoonful by mouth daily      metoprolol succinate ER (TOPROL XL) 25 MG 24 hr tablet Take 1 tablet (25 mg) by mouth daily. 90 tablet 3    Multiple Vitamins-Minerals (ICAPS AREDS 2 PO) Take by mouth 2 times daily       OMEPRAZOLE PO Take 20 mg by mouth daily       Pediatric Multivit-Minerals-C (FLINTSTONES COMPLETE PO) Take 1 tablet by mouth daily       simvastatin (ZOCOR) 40 MG tablet Take 1 tablet (40 mg) by mouth daily. 90 tablet 3     No current facility-administered medications for this visit.           Review of Systems  CONSTITUTIONAL: NEGATIVE for fever, chills, change in weight  EYES: NEGATIVE for vision changes or irritation  ENT/MOUTH: NEGATIVE for ear, mouth and throat problems  GI: NEGATIVE for nausea, abdominal pain, heartburn, or change in bowel habits  : NEGATIVE for frequency, dysuria, or hematuria  MUSCULOSKELETAL: NEGATIVE for significant arthralgias or myalgia  NEURO: NEGATIVE for weakness, dizziness or paresthesias  ENDOCRINE: NEGATIVE for temperature intolerance, skin/hair changes  HEME: NEGATIVE for bleeding problems  PSYCHIATRIC: NEGATIVE for changes in mood or affect    Current Outpatient Medications   Medication Sig Dispense Refill    Cholecalciferol (VITAMIN D) 1000 UNITS capsule Take 1 capsule by mouth daily       Cyanocobalamin (VITAMIN B 12 PO) Take 2,500 mcg by mouth daily sublingual      estradiol (ESTRACE) 0.1 MG/GM vaginal cream Place 2 g vaginally twice a week. 42.5 g 2    ferrous sulfate (FEROSUL) 325 (65 Fe) MG tablet Take 325 mg by mouth twice a week       levothyroxine (SYNTHROID/LEVOTHROID) 50 MCG tablet TAKE 1 TABLET BY MOUTH EVERY DAY 90 tablet 3    methylcellulose (CITRUCEL) powder Take 3 teaspoonful by mouth daily      metoprolol succinate ER (TOPROL XL) 25 MG 24 hr tablet Take 1 tablet (25 mg) by mouth daily. 90 tablet 3    Multiple Vitamins-Minerals (ICAPS AREDS 2 PO) Take by mouth 2 times daily        OMEPRAZOLE PO Take 20 mg by mouth daily       Pediatric Multivit-Minerals-C (FLINTSTONES COMPLETE PO) Take 1 tablet by mouth daily       simvastatin (ZOCOR) 40 MG tablet Take 1 tablet (40 mg) by mouth daily. 90 tablet 3     No current facility-administered medications for this visit.           Objective           Vitals:  No vitals were obtained today due to virtual visit.    Physical Exam   General: Alert and no distress //Respiratory: No audible wheeze, cough, or shortness of breath // Psychiatric:  Appropriate affect, tone, and pace of words    14-day recording.  Occasional PACs (2.4%) and 563 SVT runs up to ~5 minutes (rate in the 120s).  No symptoms reported.           Phone call duration: 20 minutes  Signed Electronically by: Jasbir Garcia MD

## 2025-08-17 ENCOUNTER — HEALTH MAINTENANCE LETTER (OUTPATIENT)
Age: 85
End: 2025-08-17

## 2025-08-28 ENCOUNTER — OFFICE VISIT (OUTPATIENT)
Dept: URGENT CARE | Facility: URGENT CARE | Age: 85
End: 2025-08-28
Payer: MEDICARE

## 2025-08-28 VITALS
BODY MASS INDEX: 29.66 KG/M2 | HEART RATE: 51 BPM | TEMPERATURE: 97.4 F | DIASTOLIC BLOOD PRESSURE: 78 MMHG | OXYGEN SATURATION: 98 % | SYSTOLIC BLOOD PRESSURE: 118 MMHG | WEIGHT: 178 LBS | HEIGHT: 65 IN | RESPIRATION RATE: 16 BRPM

## 2025-08-28 DIAGNOSIS — B07.0 PLANTAR WARTS: Primary | ICD-10-CM

## (undated) DEVICE — GLOVE PROTEXIS POWDER FREE 7.5 ORTHOPEDIC 2D73ET75

## (undated) DEVICE — BAG CLEAR TRASH 1.3M 39X33" P4040C

## (undated) DEVICE — ESU GROUND PAD ADULT W/CORD E7507

## (undated) DEVICE — PACK TOTAL HIP RIDGES LATEX PO15HIFSG

## (undated) DEVICE — DRSG GAUZE 4X4" TRAY

## (undated) DEVICE — LINEN HALF SHEET 5512

## (undated) DEVICE — BLADE SAW SAGITTAL STRK 18X90X1.27MM HD SYS 6 6118-127-090

## (undated) DEVICE — SU VICRYL 1 MO-4 18" J702D

## (undated) DEVICE — SU ETHIBOND 2 V-37 4X30" MX69G

## (undated) DEVICE — LINEN ORTHO ACL PACK 5447

## (undated) DEVICE — LINEN DRAPE 54X72" 5467

## (undated) DEVICE — SU VICRYL 2-0 CT-1 27" UND J259H

## (undated) DEVICE — DRSG ADAPTIC 3X8" 6113

## (undated) DEVICE — DRSG ABDOMINAL 07 1/2X8" 7197D

## (undated) DEVICE — PREP CHLORAPREP 26ML TINTED HI-LITE ORANGE 930815

## (undated) DEVICE — LINEN FULL SHEET 5511

## (undated) DEVICE — GLOVE PROTEXIS BLUE W/NEU-THERA 8.0  2D73EB80

## (undated) DEVICE — SET HANDPIECE INTERPULSE W/COAXIAL FAN SPRAY TIP 0210118000

## (undated) RX ORDER — EPHEDRINE SULFATE 50 MG/ML
INJECTION, SOLUTION INTRAVENOUS
Status: DISPENSED
Start: 2021-02-02

## (undated) RX ORDER — DEXAMETHASONE SODIUM PHOSPHATE 4 MG/ML
INJECTION, SOLUTION INTRA-ARTICULAR; INTRALESIONAL; INTRAMUSCULAR; INTRAVENOUS; SOFT TISSUE
Status: DISPENSED
Start: 2021-02-02

## (undated) RX ORDER — NEOSTIGMINE METHYLSULFATE 1 MG/ML
VIAL (ML) INJECTION
Status: DISPENSED
Start: 2021-02-02

## (undated) RX ORDER — PROPOFOL 10 MG/ML
INJECTION, EMULSION INTRAVENOUS
Status: DISPENSED
Start: 2021-02-02

## (undated) RX ORDER — FENTANYL CITRATE 50 UG/ML
INJECTION, SOLUTION INTRAMUSCULAR; INTRAVENOUS
Status: DISPENSED
Start: 2021-02-02

## (undated) RX ORDER — ONDANSETRON 2 MG/ML
INJECTION INTRAMUSCULAR; INTRAVENOUS
Status: DISPENSED
Start: 2021-02-02

## (undated) RX ORDER — LABETALOL 20 MG/4 ML (5 MG/ML) INTRAVENOUS SYRINGE
Status: DISPENSED
Start: 2021-02-02

## (undated) RX ORDER — EPHEDRINE SULFATE 50 MG/ML
INJECTION, SOLUTION INTRAMUSCULAR; INTRAVENOUS; SUBCUTANEOUS
Status: DISPENSED
Start: 2021-02-02

## (undated) RX ORDER — FENTANYL CITRATE-0.9 % NACL/PF 10 MCG/ML
PLASTIC BAG, INJECTION (ML) INTRAVENOUS
Status: DISPENSED
Start: 2021-02-02

## (undated) RX ORDER — LIDOCAINE HYDROCHLORIDE 10 MG/ML
INJECTION, SOLUTION EPIDURAL; INFILTRATION; INTRACAUDAL; PERINEURAL
Status: DISPENSED
Start: 2021-02-02

## (undated) RX ORDER — GLYCOPYRROLATE 0.2 MG/ML
INJECTION INTRAMUSCULAR; INTRAVENOUS
Status: DISPENSED
Start: 2021-02-02

## (undated) RX ORDER — CEFAZOLIN SODIUM 2 G/100ML
INJECTION, SOLUTION INTRAVENOUS
Status: DISPENSED
Start: 2021-02-02

## (undated) RX ORDER — ACETAMINOPHEN 325 MG/1
TABLET ORAL
Status: DISPENSED
Start: 2021-02-02

## (undated) RX ORDER — TRANEXAMIC ACID 650 MG/1
TABLET ORAL
Status: DISPENSED
Start: 2021-02-02

## (undated) RX ORDER — PROMETHAZINE HYDROCHLORIDE 25 MG/ML
INJECTION, SOLUTION INTRAMUSCULAR; INTRAVENOUS
Status: DISPENSED
Start: 2021-02-02